# Patient Record
Sex: MALE | Race: WHITE | Employment: UNEMPLOYED | RURAL
[De-identification: names, ages, dates, MRNs, and addresses within clinical notes are randomized per-mention and may not be internally consistent; named-entity substitution may affect disease eponyms.]

---

## 2017-01-06 ENCOUNTER — TELEPHONE (OUTPATIENT)
Dept: FAMILY MEDICINE CLINIC | Age: 52
End: 2017-01-06

## 2017-01-06 NOTE — TELEPHONE ENCOUNTER
See note below. LOV 12/28/16. Pt states legs & feet are still swelling & mostly at night, pt states he would like to see cardio if you think its warranted. Please advise.

## 2017-01-09 DIAGNOSIS — R60.9 EDEMA, UNSPECIFIED TYPE: Primary | ICD-10-CM

## 2017-01-09 NOTE — TELEPHONE ENCOUNTER
I am fine with that. I have placed an order for him to be seen by Dr. Sea Harris, and he should call and make an appointment when he is able\" [phone]. Thanks!

## 2017-01-10 NOTE — TELEPHONE ENCOUNTER
See notes below. advised pt order was placed to see Cardio/Dr. Delores Flower & to call to schedule apt when able.

## 2017-01-13 ENCOUNTER — OFFICE VISIT (OUTPATIENT)
Dept: FAMILY MEDICINE CLINIC | Age: 52
End: 2017-01-13

## 2017-01-13 VITALS
SYSTOLIC BLOOD PRESSURE: 128 MMHG | DIASTOLIC BLOOD PRESSURE: 84 MMHG | BODY MASS INDEX: 30.92 KG/M2 | HEART RATE: 74 BPM | OXYGEN SATURATION: 95 % | HEIGHT: 67 IN | RESPIRATION RATE: 16 BRPM | WEIGHT: 197 LBS | TEMPERATURE: 98.4 F

## 2017-01-13 DIAGNOSIS — B07.0 PLANTAR WART: Primary | ICD-10-CM

## 2017-01-13 NOTE — PROGRESS NOTES
Subjective:      Daina Tompkins is a 46 y.o. male here today with complaint of rash to the right great toe. Has noticed over the last few days. He is currently on terbinafine for athlete's foot which he reports compliance with. He reports that area is minimally tender. No associated drainage or erythema. Current Outpatient Prescriptions   Medication Sig Dispense Refill    terbinafine HCl (LAMISIL) 250 mg tablet Take 1 Tab by mouth daily. 14 Tab 0    promethazine (PHENERGAN) 25 mg tablet Take 25 mg by mouth every six (6) hours as needed for Nausea.  oxyCODONE-acetaminophen (PERCOCET) 5-325 mg per tablet Take 1-2 tablets by mouth every 4 to 6 hours as needed for pain 90 Tab 0    diazePAM (VALIUM) 5 mg tablet Take 1 Tab by mouth every eight (8) hours as needed (muscle spasm). Max Daily Amount: 15 mg. 30 Tab 2    docusate sodium (COLACE) 100 mg capsule Take 1 Cap by mouth two (2) times a day. 60 Cap 2    albuterol (PROVENTIL HFA, VENTOLIN HFA, PROAIR HFA) 90 mcg/actuation inhaler Take 2 Puffs by inhalation every four (4) hours as needed for Wheezing. 1 Inhaler 2    EPINEPHrine (EPIPEN) 0.3 mg/0.3 mL (1:1,000) injection 0.3 mL by IntraMUSCular route once as needed for up to 1 dose. 0.6 mL 2    pantoprazole (PROTONIX) 40 mg tablet Take 1 Tab by mouth daily. (Patient taking differently: Take 40 mg by mouth two (2) times a day.) 90 Tab 1       Allergies   Allergen Reactions    Venom-Honey Bee Anaphylaxis    Methadone Rash    Neurontin [Gabapentin] Vertigo    Penicillins Rash     Pt.  States he takes Keflex with no difficulties or adverse reactions    Trilafon Other (comments)     Tardive dyskinesia         Past Medical History   Diagnosis Date    Arthritis     Asthma     Back pain     Chronic obstructive pulmonary disease (HCC)     Chronic pain      back    GERD (gastroesophageal reflux disease)     Heart attack ([facility] [address] 2002    Ill-defined condition      Obesity  BMI=30 on 11/17/2016    Seizures [facility])      last seizure 2002    Thromboembolism of vein 1980s     multiple in left leg    TIA (transient ischemic attack) 2000     no residual deficits       Social History   Substance Use Topics    Smoking status: Current Every Day Smoker     Packs/day: 1.00     Years: 40.00    Smokeless tobacco: Never Used    Alcohol use 1.8 oz/week     3 Cans of beer per week        Review of Systems  Pertinent items are noted in HPI. Objective:     Visit Vitals    /84    Pulse 74    Temp 98.4 °F (36.9 °C) (Oral)    Resp 16    Ht 5' 7\" (1.702 m)    Wt 197 lb (89.4 kg)    SpO2 95%    BMI 30.85 kg/m2      General appearance - alert, well appearing, and in no distress  Chest - respirations unlabored   Extremities - right big toe with 3 flat lesions with warty appearance, (+) tinea pedis     Assessment/Plan:   Clora Schwab is a 46 y.o. male seen for:     1. Plantar wart: After informed consent was obtained, using alcohol for cleansing cryotherapy with liquid nitrogen was performed. Antibiotic dressing is applied, and wound care instructions provided. Be alert for any signs of cutaneous infection. The procedure was well tolerated without complications.  - [id number] - DESTRUC BENIGN LESION, UP TO 14 LESIONS    I have discussed the diagnosis with the patient and the intended plan as seen in the above orders. The patient has received an after-visit summary and questions were answered concerning future plans. I have discussed medication side effects and warnings with the patient as well. Patient verbalizes understanding of plan of care and denies further questions or concerns at this time. Informed patient to return to the office if symptoms worsen or if new symptoms arise.     Follow-up Disposition: Not on File

## 2017-01-13 NOTE — PROGRESS NOTES
Identified pt with two pt identifiers(name and ). Chief Complaint   Patient presents with    Rash     right great toe      Pt was seen on  for athletes foot & pt thinks it may not be athletes foot. Health Maintenance Due   Topic    Pneumococcal 19-64 Medium Risk (1 of 1 - PPSV23)    DTaP/Tdap/Td series (1 - Tdap)    FOBT Q 1 YEAR AGE 50-75        Wt Readings from Last 3 Encounters:   17 197 lb (89.4 kg)   16 197 lb (89.4 kg)   16 193 lb (87.5 kg)     Temp Readings from Last 3 Encounters:   17 98.4 °F (36.9 °C) (Oral)   16 98 °F (36.7 °C) (Oral)   16 98.2 °F (36.8 °C) (Oral)     BP Readings from Last 3 Encounters:   17 128/84   16 120/78   16 120/78     Pulse Readings from Last 3 Encounters:   17 74   16 89   16 88         Learning Assessment:  :     Learning Assessment 2016 12/10/2015   PRIMARY LEARNER Patient Patient   HIGHEST LEVEL OF EDUCATION - PRIMARY LEARNER  2 YEARS OF COLLEGE -   CO-LEARNER CAREGIVER No No   PRIMARY LANGUAGE ENGLISH ENGLISH   LEARNER PREFERENCE PRIMARY LISTENING LISTENING   ANSWERED BY patient patient   RELATIONSHIP SELF SELF       Depression Screening:  :     PHQ 2 / 9, over the last two weeks 2017   Little interest or pleasure in doing things Not at all   Feeling down, depressed or hopeless Not at all   Total Score PHQ 2 0           Coordination of Care Questionnaire:  :     1) Have you been to an emergency room, urgent care clinic since your last visit? no   Hospitalized since your last visit? no             2) Have you seen or consulted any other health care providers outside of [address] [place] [address] since your last visit? no  (Include any pap smears or colon screenings in this section.)    3) Do you have an Advance Directive on file?  no  Are you interested in receiving information about Advance Directives? no    Patient is accompanied by self I have received verbal consent from Yaya Schwarz  to discuss any/all medical information while they are present in the room. Reviewed record in preparation for visit and have obtained necessary documentation. Medication reconciliation up to date and corrected with patient at this time.

## 2017-01-16 ENCOUNTER — OFFICE VISIT (OUTPATIENT)
Dept: CARDIOLOGY CLINIC | Age: 52
End: 2017-01-16

## 2017-01-16 VITALS
WEIGHT: 196 LBS | DIASTOLIC BLOOD PRESSURE: 90 MMHG | BODY MASS INDEX: 30.76 KG/M2 | RESPIRATION RATE: 22 BRPM | SYSTOLIC BLOOD PRESSURE: 142 MMHG | OXYGEN SATURATION: 99 % | HEART RATE: 88 BPM | HEIGHT: 67 IN

## 2017-01-16 DIAGNOSIS — R06.00 DYSPNEA, UNSPECIFIED TYPE: ICD-10-CM

## 2017-01-16 DIAGNOSIS — R07.9 CHEST PAIN, UNSPECIFIED TYPE: Primary | ICD-10-CM

## 2017-01-16 DIAGNOSIS — R60.9 EDEMA, UNSPECIFIED TYPE: ICD-10-CM
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
## 2017-01-17 ENCOUNTER — HOSPITAL ENCOUNTER (OUTPATIENT)
Dept: GENERAL RADIOLOGY | Age: 52
Discharge: HOME | End: 2017-01-17
Attending: SPECIALIST
Payer: MEDICARE

## 2017-01-17 DIAGNOSIS — K11.20 SIALADENITIS: ICD-10-CM

## 2017-01-17 DIAGNOSIS — R13.10 DYSPHAGIA: ICD-10-CM

## 2017-01-17 PROCEDURE — 92611 MOTION FLUOROSCOPY/SWALLOW: CPT | Performed by: SPEECH-LANGUAGE PATHOLOGIST

## 2017-01-17 PROCEDURE — 74230 X-RAY XM SWLNG FUNCJ C+: CPT
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
## 2017-01-20 ENCOUNTER — OFFICE VISIT (OUTPATIENT)
Dept: FAMILY MEDICINE CLINIC | Age: 52
End: 2017-01-20

## 2017-01-20 VITALS
WEIGHT: 202 LBS | BODY MASS INDEX: 31.71 KG/M2 | SYSTOLIC BLOOD PRESSURE: 130 MMHG | HEART RATE: 83 BPM | OXYGEN SATURATION: 99 % | HEIGHT: 67 IN | TEMPERATURE: 97.8 F | DIASTOLIC BLOOD PRESSURE: 72 MMHG | RESPIRATION RATE: 16 BRPM

## 2017-01-20 DIAGNOSIS — R51.9 FREQUENT HEADACHES: ICD-10-CM

## 2017-01-20 DIAGNOSIS — Z23 ENCOUNTER FOR IMMUNIZATION: ICD-10-CM

## 2017-01-20 DIAGNOSIS — T14.8XXA PUNCTURE WOUND: Primary | ICD-10-CM

## 2017-01-20 RX ORDER — CEPHALEXIN 500 MG/1
500 CAPSULE ORAL 3 TIMES DAILY
Qty: 30 CAP | Refills: 0 | Status: SHIPPED | OUTPATIENT
Start: 2017-01-20 | End: 2017-01-30 | Stop reason: ALTCHOICE

## 2017-01-20 RX ORDER — MUPIROCIN 20 MG/G
OINTMENT TOPICAL 2 TIMES DAILY
Qty: 22 G | Refills: 0 | Status: SHIPPED | OUTPATIENT
Start: 2017-01-20 | End: 2017-01-30 | Stop reason: ALTCHOICE

## 2017-01-20 NOTE — MR AVS SNAPSHOT
Visit Information Date & Time Provider Department Dept. Phone Encounter #  
 1/20/2017 10:30 AM [facility]ak [facility]to[facility] [phone] [id number] Follow-up Instructions Return if symptoms worsen or fail to improve. Your Appointments 1/27/2017 10:00 AM  
NUCLEAR MEDICINE with CLARENCE MCKINNEY  
[facility] ([facility] SCHEDULING) Appt Note: lexiscan nuclear stress test at 10am ht 5'7 wt 196 [facility] [place] [address] [id number]  
[phone]  
  
   
 [address] [facility] [address] [address]  
  
    
 1/27/2017  1:00 PM  
ECHO CARDIOGRAMS 2D with CLARENCE MULLIGAN  
[facility] ([facility]) Appt Note: lexiscan nuclear stress test at 10am ht 5'7 wt 196 [facility] [address] [facility]  
[phone]  
  
   
 [address] [address]  
  
    
 2/2/2017  2:00 PM  
ESTABLISHED PATIENT with Genet Hyde MD  
[facility] ([facility]) Appt Note: follow up from testing [address] [address] [facility]  
[address] [facility] [place] [address] [address] Upcoming Health Maintenance Date Due FOBT Q 1 YEAR AGE 50-75 2/27/2013 DTaP/Tdap/Td series (2 - Td) 1/20/2027 Allergies as of 1/20/2017  Review Complete On: 1/20/2017 By: Barak Cotto NP Severity Noted Reaction Type Reactions Venom-honey Bee High 08/12/2015    Anaphylaxis Methadone  08/12/2015    Rash Neurontin [Gabapentin]  08/12/2015    Vertigo Penicillins  08/12/2015    Rash Pt. States he takes Keflex with no difficulties or adverse reactions Trilafon  08/12/2015    Other (comments) Tardive dyskinesia Current Immunizations  Never Reviewed Name Date Tdap  Incomplete Not reviewed this visit You Were Diagnosed With   
  
 Codes Comments Puncture wound    -  Primary ICD-10-CM: T14.8 ICD-9-CM: 879.8 Encounter for immunization     ICD-10-CM: V30 ICD-9-CM: V03.89 Vitals BP Pulse Temp Resp Height(growth percentile) Weight(growth percentile) 130/72 83 97.8 °F (36.6 °C) (Oral) 16 5' 7\" (1.702 m) 202 lb (91.6 kg) SpO2 BMI Smoking Status 99% 31.64 kg/m2 Current Every Day Smoker BMI and BSA Data Body Mass Index Body Surface Area  
 31.64 kg/m 2 2.08 m 2 Preferred Pharmacy Pharmacy Name Phone [id number] - [place] [address] [facility] [address] [phone] Your Updated Medication List  
  
   
This list is accurate as of: 1/20/17 10:52 AM.  Always use your most recent med list.  
  
  
  
  
 albuterol 90 mcg/actuation inhaler Commonly known as:  PROVENTIL HFA, VENTOLIN HFA, PROAIR HFA Take 2 Puffs by inhalation every four (4) hours as needed for Wheezing. cephALEXin 500 mg capsule Commonly known as:  Wood Bless Take 1 Cap by mouth three (3) times daily for 10 days. diazePAM 5 mg tablet Commonly known as:  VALIUM Take 1 Tab by mouth every eight (8) hours as needed (muscle spasm). Max Daily Amount: 15 mg.  
  
 docusate sodium 100 mg capsule Commonly known as:  Lucetta Angeles Take 1 Cap by mouth two (2) times a day. EPINEPHrine 0.3 mg/0.3 mL injection Commonly known as:  EPIPEN  
0.3 mL by IntraMUSCular route once as needed for up to 1 dose. mupirocin 2 % ointment Commonly known as:  [employer] Apply  to affected area two (2) times a day. oxyCODONE-acetaminophen 5-325 mg per tablet Commonly known as:  PERCOCET Take 1-2 tablets by mouth every 4 to 6 hours as needed for pain  
  
 pantoprazole 40 mg tablet Commonly known as:  PROTONIX Take 1 Tab by mouth daily. promethazine 25 mg tablet Commonly known as:  PHENERGAN  
 Take 25 mg by mouth every six (6) hours as needed for Nausea. Prescriptions Sent to Pharmacy Refills  
 mupirocin (BACTROBAN) 2 % ointment 0 Sig: Apply  to affected area two (2) times a day. Class: Normal  
 [id number]. Ph #: [phone] Route: Topical  
 cephALEXin (KEFLEX) 500 mg capsule 0 Sig: Take 1 Cap by mouth three (3) times daily for 10 days. Class: Normal  
 [id number]  [facility]. Ph #: [phone] Route: Oral  
  
We Performed the Following TETANUS, DIPHTHERIA TOXOIDS AND ACELLULAR PERTUSSIS VACCINE (TDAP), IN INDIVIDS. >=7, IM [id number] CPT(R)] Follow-up Instructions Return if symptoms worsen or fail to improve. Patient Instructions Vaccine Information Statement Tdap (Tetanus, Diphtheria, Pertussis) Vaccine: What You Need to Know Many Vaccine Information Statements are available in Chinese and other languages. See www.immunize.org/vis. Hojas de Información Sobre Vacunas están disponibles en español y en muchos otros idiomas. Visite [place] City[employer].si 1. Why get vaccinated? Tetanus, diphtheria, and pertussis are very serious diseases. Tdap vaccine can protect us from these diseases. And, Tdap vaccine given to pregnant women can protect  babies against pertussis. TETANUS (Lockjaw) is rare in the [facility] today. It causes painful muscle tightening and stiffness, usually all over the body. ? It can lead to tightening of muscles in the head and neck so you cant open your mouth, swallow, or sometimes even breathe. Tetanus kills about 1 out of 10 people who are infected even after receiving the best medical care. DIPHTHERIA is also rare in the [facility] today. It can cause a thick coating to form in the back of the throat. ? It can lead to breathing problems, heart failure, paralysis, and death. PERTUSSIS (Whooping Cough) causes severe coughing spells, which can cause difficulty breathing, vomiting, and disturbed sleep. ? It can also lead to weight loss, incontinence, and rib fractures. Up to 2 in 100 adolescents and 5 in 100 adults with pertussis are hospitalized or have complications, which could include pneumonia or death. These diseases are caused by bacteria. Diphtheria and pertussis are spread from person to person through secretions from coughing or sneezing. Tetanus enters the body through cuts, scratches, or wounds. Before vaccines, as many as 200,000 cases of diphtheria, 200,000 cases of pertussis, and hundreds of cases of tetanus, were reported in the [place] each year. Since vaccination began, reports of cases for tetanus and diphtheria have dropped by about 99% and for pertussis by about 80%. 2. Tdap vaccine Tdap vaccine can protect adolescents and adults from tetanus, diphtheria, and pertussis. One dose of Tdap is routinely given at age 6 or 15. People who did not get Tdap at that age should get it as soon as possible. Tdap is especially important for health care professionals and anyone having close contact with a baby younger than 12 months. Pregnant women should get a dose of Tdap during every pregnancy, to protect the  from pertussis. Infants are most at risk for severe, life-threatening complications from pertussis. Another vaccine, called Td, protects against tetanus and diphtheria, but not pertussis. A Td booster should be given every 10 years. Tdap may be given as one of these boosters if you have never gotten Tdap before. Tdap may also be given after a severe cut or burn to prevent tetanus infection. Your doctor or the person giving you the vaccine can give you more information. Tdap may safely be given at the same time as other vaccines. 3. Some people should not get this vaccine  A person who has ever had a life-threatening allergic reaction after a previous dose of any diphtheria, tetanus or pertussis containing vaccine, OR has a severe allergy to any part of this vaccine, should not get Tdap vaccine. Tell the person giving the vaccine about any severe allergies.  Anyone who had coma or long repeated seizures within 7 days after a childhood dose of DTP or DTaP, or a previous dose of Tdap, should not get Tdap, unless a cause other than the vaccine was found. They can still get Td.  Talk to your doctor if you: 
- have seizures or another nervous system problem, 
- had severe pain or swelling after any vaccine containing diphtheria, tetanus or pertussis,  
- ever had a condition called Guillain Barré Syndrome (GBS), 
- arent feeling well on the day the shot is scheduled. 4. Risks With any medicine, including vaccines, there is a chance of side effects. These are usually mild and go away on their own. Serious reactions are also possible but are rare. Most people who get Tdap vaccine do not have any problems with it. Mild Problems following Tdap 
(Did not interfere with activities)  Pain where the shot was given (about 3 in 4 adolescents or 2 in 3 adults)  Redness or swelling where the shot was given (about 1 person in 5)  Mild fever of at least 100.4°F (up to about 1 in 25 adolescents or 1 in 100 adults)  Headache (about 3 or 4 people in 10)  Tiredness (about 1 person in 3 or 4)  Nausea, vomiting, diarrhea, stomach ache (up to 1 in 4 adolescents or 1 in 10 adults)  Chills,  sore joints (about 1 person in 10)  Body aches (about 1 person in 3 or 4)  Rash, swollen glands (uncommon) Moderate Problems following Tdap (Interfered with activities, but did not require medical attention)  Pain where the shot was given (up to 1 in 5 or 6)  Redness or swelling where the shot was given (up to about 1 in 16 adolescents or 1 in 12 adults)  Fever over 102°F (about 1 in 100 adolescents or 1 in 250 adults)  Headache (about 1 in 7 adolescents or 1 in 10 adults)  Nausea, vomiting, diarrhea, stomach ache (up to 1 or 3 people in 100)  Swelling of the entire arm where the shot was given (up to about 1 in 500). Severe Problems following Tdap 
(Unable to perform usual activities; required medical attention)  Swelling, severe pain, bleeding, and redness in the arm where the shot was given (rare). Problems that could happen after any vaccine:  People sometimes faint after a medical procedure, including vaccination. Sitting or lying down for about 15 minutes can help prevent fainting, and injuries caused by a fall. Tell your doctor if you feel dizzy, or have vision changes or ringing in the ears.  Some people get severe pain in the shoulder and have difficulty moving the arm where a shot was given. This happens very rarely.  Any medication can cause a severe allergic reaction. Such reactions from a vaccine are very rare, estimated at fewer than 1 in a million doses, and would happen within a few minutes to a few hours after the vaccination. As with any medicine, there is a very remote chance of a vaccine causing a serious injury or death. The safety of vaccines is always being monitored. For more information, visit: www.cdc.gov/vaccinesafety/ 
 
 
The [facility] Vaccine Injury Compensation Program (VICP) is a federal program that was created to compensate people who may have been injured by certain vaccines. Persons who believe they may have been injured by a vaccine can learn about the program and about filing a claim by calling 1-116.292.2135 or visiting the [employer] website at www.[facility].gov/vaccinecompensation. There is a time limit to file a claim for compensation. 7. How can I learn more?  Ask your doctor. He or she can give you the vaccine package insert or suggest other sources of information.  Call your local or state health department.  Contact the Centers for Disease Control and Prevention (CDC): 
- Call 6-385.940.2802 (4-030-SYM-INFO) or 
- Visit CDCs website at www.cdc.gov/vaccines Vaccine Information Statement Tdap Vaccine 
(2/24/2015) [address] 194YI-25 Department of Health and [employer] Centers for Disease Control and Prevention Office Use Only Introducing [facility]! Tricia Lopez introduces Changelight patient portal. Now you can access parts of your medical record, email your doctor's office, and request medication refills online. 1. In your internet browser, go to [email or web address] [email or web address]/Related Content Database (RCDb)t 2. Click on the First Time User? Click Here link in the Sign In box. You will see the New Member Sign Up page. 3. Enter your Changelight Access Code exactly as it appears below. You will not need to use this code after youve completed the sign-up process. If you do not sign up before the expiration date, you must request a new code. · Vinspi Access Code: [email or web address] Expires: 4/20/2017 10:52 AM 
 
4. Enter the last four digits of your Social Security Number (xxxx) and Date of Birth (mm/dd/yyyy) as indicated and click Submit. You will be taken to the next sign-up page. 5. Create a Vinspi ID. This will be your Vinspi login ID and cannot be changed, so think of one that is secure and easy to remember. 6. Create a Vinspi password. You can change your password at any time. 7. Enter your Password Reset Question and Answer. This can be used at a later time if you forget your password. 8. Enter your e-mail address. You will receive e-mail notification when new information is available in [address] 9. Click Sign Up. You can now view and download portions of your medical record. 10. Click the Download Summary menu link to download a portable copy of your medical information. If you have questions, please visit the Frequently Asked Questions section of the Vinspi website. Remember, Vinspi is NOT to be used for urgent needs. For medical emergencies, dial 911. Now available from your iPhone and Android! Please provide this summary of care documentation to your next provider. Your primary care clinician is listed as Sarika Carmona. If you have any questions after today's visit, please call [phone].

## 2017-01-20 NOTE — PATIENT INSTRUCTIONS
Vaccine Information Statement     Tdap (Tetanus, Diphtheria, Pertussis) Vaccine: What You Need to Know    Many Vaccine Information Statements are available in Tajik and other languages. See www.immunize.org/vis. Hojas de Información Sobre Vacunas están disponibles en español y en muchos otros idiomas. Visite YudiScale.si    1. Why get vaccinated? Tetanus, diphtheria, and pertussis are very serious diseases. Tdap vaccine can protect us from these diseases. And, Tdap vaccine given to pregnant women can protect  babies against pertussis. TETANUS (Lockjaw) is rare in the [facility] today. It causes painful muscle tightening and stiffness, usually all over the body.  It can lead to tightening of muscles in the head and neck so you cant open your mouth, swallow, or sometimes even breathe. Tetanus kills about 1 out of 10 people who are infected even after receiving the best medical care. DIPHTHERIA is also rare in the [facility] today. It can cause a thick coating to form in the back of the throat.  It can lead to breathing problems, heart failure, paralysis, and death. PERTUSSIS (Whooping Cough) causes severe coughing spells, which can cause difficulty breathing, vomiting, and disturbed sleep.  It can also lead to weight loss, incontinence, and rib fractures. Up to 2 in 100 adolescents and 5 in 100 adults with pertussis are hospitalized or have complications, which could include pneumonia or death. These diseases are caused by bacteria. Diphtheria and pertussis are spread from person to person through secretions from coughing or sneezing. Tetanus enters the body through cuts, scratches, or wounds. Before vaccines, as many as 200,000 cases of diphtheria, 200,000 cases of pertussis, and hundreds of cases of tetanus, were reported in the [place] each year.  Since vaccination began, reports of cases for tetanus and diphtheria have dropped by about 99% and for pertussis by about 80%. 2. Tdap vaccine    Tdap vaccine can protect adolescents and adults from tetanus, diphtheria, and pertussis. One dose of Tdap is routinely given at age 6 or 15. People who did not get Tdap at that age should get it as soon as possible. Tdap is especially important for health care professionals and anyone having close contact with a baby younger than 12 months. Pregnant women should get a dose of Tdap during every pregnancy, to protect the  from pertussis. Infants are most at risk for severe, life-threatening complications from pertussis. Another vaccine, called Td, protects against tetanus and diphtheria, but not pertussis. A Td booster should be given every 10 years. Tdap may be given as one of these boosters if you have never gotten Tdap before. Tdap may also be given after a severe cut or burn to prevent tetanus infection. Your doctor or the person giving you the vaccine can give you more information. Tdap may safely be given at the same time as other vaccines. 3. Some people should not get this vaccine     A person who has ever had a life-threatening allergic reaction after a previous dose of any diphtheria, tetanus or pertussis containing vaccine, OR has a severe allergy to any part of this vaccine, should not get Tdap vaccine. Tell the person giving the vaccine about any severe allergies.  Anyone who had coma or long repeated seizures within 7 days after a childhood dose of DTP or DTaP, or a previous dose of Tdap, should not get Tdap, unless a cause other than the vaccine was found. They can still get Td.  Talk to your doctor if you:  - have seizures or another nervous system problem,  - had severe pain or swelling after any vaccine containing diphtheria, tetanus or pertussis,   - ever had a condition called Guillain Barré Syndrome (GBS),  - arent feeling well on the day the shot is scheduled.     4. Risks    With any medicine, including vaccines, there is a chance of side effects. These are usually mild and go away on their own. Serious reactions are also possible but are rare. Most people who get Tdap vaccine do not have any problems with it. Mild Problems following Tdap  (Did not interfere with activities)   Pain where the shot was given (about 3 in 4 adolescents or 2 in 3 adults)   Redness or swelling where the shot was given (about 1 person in 5)   Mild fever of at least 100.4°F (up to about 1 in 25 adolescents or 1 in 100 adults)   Headache (about 3 or 4 people in 10)   Tiredness (about 1 person in 3 or 4)   Nausea, vomiting, diarrhea, stomach ache (up to 1 in 4 adolescents or 1 in 10 adults)   Chills,  sore joints (about 1 person in 10)   Body aches (about 1 person in 3 or 4)    Rash, swollen glands (uncommon)    Moderate Problems following Tdap  (Interfered with activities, but did not require medical attention)   Pain where the shot was given (up to 1 in 5 or 6)    Redness or swelling where the shot was given (up to about 1 in 16 adolescents or 1 in 12 adults)   Fever over 102°F (about 1 in 100 adolescents or 1 in 250 adults)   Headache (about 1 in 7 adolescents or 1 in 10 adults)   Nausea, vomiting, diarrhea, stomach ache (up to 1 or 3 people in 100)   Swelling of the entire arm where the shot was given (up to about 1 in 500). Severe Problems following Tdap  (Unable to perform usual activities; required medical attention)   Swelling, severe pain, bleeding, and redness in the arm where the shot was given (rare). Problems that could happen after any vaccine:     People sometimes faint after a medical procedure, including vaccination. Sitting or lying down for about 15 minutes can help prevent fainting, and injuries caused by a fall. Tell your doctor if you feel dizzy, or have vision changes or ringing in the ears.      Some people get severe pain in the shoulder and have difficulty moving the arm where a shot was given. This happens very rarely.  Any medication can cause a severe allergic reaction. Such reactions from a vaccine are very rare, estimated at fewer than 1 in a million doses, and would happen within a few minutes to a few hours after the vaccination. As with any medicine, there is a very remote chance of a vaccine causing a serious injury or death. The safety of vaccines is always being monitored. For more information, visit: www.cdc.gov/vaccinesafety/    5. What if there is a serious problem? What should I look for?  Look for anything that concerns you, such as signs of a severe allergic reaction, very high fever, or unusual behavior.  Signs of a severe allergic reaction can include hives, swelling of the face and throat, difficulty breathing, a fast heartbeat, dizziness, and weakness. These would usually start a few minutes to a few hours after the vaccination. What should I do?  If you think it is a severe allergic reaction or other emergency that cant wait, call 9-1-1 or get the person to the nearest hospital. Otherwise, call your doctor.  Afterward, the reaction should be reported to the Vaccine Adverse Event Reporting System (VAERS). Your doctor might file this report, or you can do it yourself through the VAERS web site at www.vaers. [facility].gov, or by calling 6-768.133.8698. VAERS does not give medical advice. 6. The National Vaccine Injury Compensation Program    The [facility] Vaccine Injury Compensation Program (VICP) is a federal program that was created to compensate people who may have been injured by certain vaccines. Persons who believe they may have been injured by a vaccine can learn about the program and about filing a claim by calling 4-365.111.4927 or visiting the [employer] website at www.[facility].gov/vaccinecompensation. There is a time limit to file a claim for compensation. 7. How can I learn more?  Ask your doctor.  He or she can give you the vaccine package insert or suggest other sources of information.  Call your local or state health department.  Contact the Centers for Disease Control and Prevention (CDC):  - Call 4-442.865.7477 (1-800-CDC-INFO) or  - Visit CDCs website at www.cdc.gov/vaccines      Vaccine Information Statement   Tdap Vaccine  (2/24/2015)  [address] [id number]-11    Department of Health and Human Services  Centers for Disease Control and Prevention    Office Use Only

## 2017-01-20 NOTE — PROGRESS NOTES
Identified pt with two pt identifiers(name and ). Chief Complaint   Patient presents with    Puncture Wound     17    Headache      Pt stepped on nail 17    Health Maintenance Due   Topic    Pneumococcal 19-64 Medium Risk (1 of 1 - PPSV23)    DTaP/Tdap/Td series (1 - Tdap)    FOBT Q 1 YEAR AGE 50-75        Wt Readings from Last 3 Encounters:   17 202 lb (91.6 kg)   17 196 lb (88.9 kg)   17 197 lb (89.4 kg)     Temp Readings from Last 3 Encounters:   17 97.8 °F (36.6 °C) (Oral)   17 98.4 °F (36.9 °C) (Oral)   16 98 °F (36.7 °C) (Oral)     BP Readings from Last 3 Encounters:   17 130/72   17 142/90   17 128/84     Pulse Readings from Last 3 Encounters:   17 83   17 88   17 74         Learning Assessment:  :     Learning Assessment 2016 12/10/2015   PRIMARY LEARNER Patient Patient   HIGHEST LEVEL OF EDUCATION - PRIMARY LEARNER  2 YEARS OF COLLEGE -   CO-LEARNER CAREGIVER No No   PRIMARY LANGUAGE ENGLISH ENGLISH   LEARNER PREFERENCE PRIMARY LISTENING LISTENING   ANSWERED BY patient patient   RELATIONSHIP SELF SELF       Depression Screening:  :     PHQ 2 / 9, over the last two weeks 2017   Little interest or pleasure in doing things Not at all   Feeling down, depressed or hopeless Not at all   Total Score PHQ 2 0           Coordination of Care Questionnaire:  :     1) Have you been to an emergency room, urgent care clinic since your last visit? no   Hospitalized since your last visit? no             2) Have you seen or consulted any other health care providers outside of [address] [place] [address] since your last visit? no  (Include any pap smears or colon screenings in this section.)    3) Do you have an Advance Directive on file?  no  Are you interested in receiving information about Advance Directives? no    Patient is accompanied by self I have received verbal consent from Negra Maldonado to discuss any/all medical information while they are present in the room. Reviewed record in preparation for visit and have obtained necessary documentation. Medication reconciliation up to date and corrected with patient at this time.

## 2017-01-20 NOTE — PROGRESS NOTES
HISTORY OF PRESENT ILLNESS  Francisco Javier Arteaga is a 46 y.o. male. HPI  Pt presents with \"puncture wound\"    Pt states that he stepped on 2 nails, yesterday  He was taking down robert decorations, and stepped on a 2 x 4. The nail did go through the bottom of his tennis shoe. He has no idea when he last had a tetanus vaccine. No discharge or drainage noted from the area. No swelling, no erythema. Pt is worried about infection, with his lack of a spleen. In addition, patient states that he is interested in talking to a neurologist about his migraines. He used to have them under control, but over the past couple of weeks, he has noted an increase. Pt is unsure what migraine medication he was taking previously. Review of Systems   Constitutional: Negative for fever. HENT: Negative for congestion. Respiratory: Negative for cough. Gastrointestinal: Negative for diarrhea and vomiting. Neurological: Positive for headaches. Physical Exam   Constitutional: He is oriented to person, place, and time. He appears well-developed and well-nourished. HENT:   Head: Normocephalic and atraumatic. Cardiovascular: Normal rate, regular rhythm and normal heart sounds. Pulmonary/Chest: Effort normal and breath sounds normal.   Neurological: He is alert and oriented to person, place, and time. Skin: Skin is warm and dry. Psychiatric: He has a normal mood and affect. His behavior is normal.       ASSESSMENT and PLAN    ICD-10-CM ICD-9-CM    1. Puncture wound T14.8 879.8 mupirocin (BACTROBAN) 2 % ointment      cephALEXin (KEFLEX) 500 mg capsule   2. Encounter for immunization Z23 V03.89 TETANUS, DIPHTHERIA TOXOIDS AND ACELLULAR PERTUSSIS VACCINE (TDAP), IN INDIVIDS. >=7, IM   3. Frequent headaches R51 784.0 REFERRAL TO NEUROLOGY     Vaccine and VIS given. Educated about taking antibiotics as prescribed, should area begin to turn erythematous, more painful, any discharge or drainage.   Educated about calling neurology, as patient requests. Pt informed to return to office with worsening of symptoms, or PRN with any questions or concerns. Pt verbalizes understanding of plan of care and denies further questions or concerns at this time.

## 2017-01-27 ENCOUNTER — CLINICAL SUPPORT (OUTPATIENT)
Dept: CARDIOLOGY CLINIC | Age: 52
End: 2017-01-27

## 2017-01-27 DIAGNOSIS — R06.00 DYSPNEA, UNSPECIFIED TYPE: ICD-10-CM

## 2017-01-27 DIAGNOSIS — R07.89 OTHER CHEST PAIN: Primary | ICD-10-CM

## 2017-01-27 DIAGNOSIS — R60.9 EDEMA, UNSPECIFIED TYPE: ICD-10-CM

## 2017-01-27 DIAGNOSIS — R07.9 CHEST PAIN, UNSPECIFIED TYPE: Primary | ICD-10-CM

## 2017-01-30 ENCOUNTER — TELEPHONE (OUTPATIENT)
Dept: NEUROLOGY | Age: 52
End: 2017-01-30

## 2017-01-30 ENCOUNTER — OFFICE VISIT (OUTPATIENT)
Dept: NEUROLOGY | Age: 52
End: 2017-01-30

## 2017-01-30 VITALS
WEIGHT: 202 LBS | SYSTOLIC BLOOD PRESSURE: 128 MMHG | RESPIRATION RATE: 16 BRPM | OXYGEN SATURATION: 98 % | TEMPERATURE: 98.6 F | BODY MASS INDEX: 31.71 KG/M2 | HEART RATE: 75 BPM | DIASTOLIC BLOOD PRESSURE: 88 MMHG | HEIGHT: 67 IN

## 2017-01-30 DIAGNOSIS — G43.009 MIGRAINE WITHOUT AURA AND WITHOUT STATUS MIGRAINOSUS, NOT INTRACTABLE: Primary | ICD-10-CM

## 2017-01-30 RX ORDER — TOPIRAMATE 25 MG/1
TABLET ORAL
Qty: 30 TAB | Refills: 6 | Status: SHIPPED | OUTPATIENT
Start: 2017-01-30 | End: 2017-01-30 | Stop reason: SDUPTHER

## 2017-01-30 RX ORDER — OXYCODONE AND ACETAMINOPHEN 7.5; 325 MG/1; MG/1
TABLET ORAL
COMMUNITY
End: 2017-04-14 | Stop reason: ALTCHOICE

## 2017-01-30 RX ORDER — TOPIRAMATE 25 MG/1
TABLET ORAL
Qty: 30 TAB | Refills: 6 | Status: SHIPPED | OUTPATIENT
Start: 2017-01-30 | End: 2017-04-14 | Stop reason: ALTCHOICE

## 2017-01-30 NOTE — PROGRESS NOTES
New patient presenting with history of migraines. Reports 5 headache days per month lasting 4 hours or more. Patient recently had surgery on c5, c6, c7  In November. Have been taking percocet since surgery on neck.

## 2017-01-30 NOTE — PATIENT INSTRUCTIONS
These headaches by description have a very close analogy to migraines. Will challenge with Topamax at night as a preventative drug and the patient will clear the rescue drug Imitrex by his cardiologist.  He is welcome to keep a headache diary. Suggest stopping smoking. Fortunately the neck does not seem to be a significant contributor to the headache story.

## 2017-01-30 NOTE — MR AVS SNAPSHOT
Visit Information Date & Time Provider Department Dept. Phone Encounter #  
 1/30/2017 11:00 AM Analilia Mendosa MD [facility] [phone] [id number] Follow-up Instructions Return in about 2 months (around 3/30/2017). Your Appointments 2/2/2017  2:00 PM  
ESTABLISHED PATIENT with Kwaku Greene MD  
[facility] ([address]) Appt Note: follow up from testing [address] [facility] [address] [facility]  
[address] [address] [address] Upcoming Health Maintenance Date Due FOBT Q 1 YEAR AGE 50-75 2/27/2013 DTaP/Tdap/Td series (2 - Td) 1/20/2027 Allergies as of 1/30/2017  Review Complete On: 1/27/2017 By: Jenae Burns RN Severity Noted Reaction Type Reactions Codeine High 01/30/2017    Anaphylaxis Venom-honey Bee High 08/12/2015    Anaphylaxis Methadone  08/12/2015    Rash Neurontin [Gabapentin]  08/12/2015    Vertigo Penicillins  08/12/2015    Rash Pt. States he takes Keflex with no difficulties or adverse reactions Trilafon  08/12/2015    Other (comments) Tardive dyskinesia Current Immunizations  Never Reviewed Name Date Tdap 1/20/2017 Not reviewed this visit You Were Diagnosed With   
  
 Codes Comments Migraine without aura and without status migrainosus, not intractable    -  Primary ICD-10-CM: G43.009 ICD-9-CM: 346.10 Vitals BP Pulse Temp Resp Height(growth percentile) Weight(growth percentile) 128/88 75 98.6 °F (37 °C) 16 5' 7\" (1.702 m) 202 lb (91.6 kg) SpO2 BMI Smoking Status 98% 31.64 kg/m2 Current Every Day Smoker Vitals History BMI and BSA Data Body Mass Index Body Surface Area  
 31.64 kg/m 2 2.08 m 2 Preferred Pharmacy Pharmacy Name Phone [id number] [place] [facility] [address] [facility] [address] [phone] Your Updated Medication List  
  
   
This list is accurate as of: 17 12:10 PM.  Always use your most recent med list.  
  
  
  
  
 albuterol 90 mcg/actuation inhaler Commonly known as:  PROVENTIL HFA, VENTOLIN HFA, PROAIR HFA Take 2 Puffs by inhalation every four (4) hours as needed for Wheezing. diazePAM 5 mg tablet Commonly known as:  VALIUM Take 1 Tab by mouth every eight (8) hours as needed (muscle spasm). Max Daily Amount: 15 mg.  
  
 docusate sodium 100 mg capsule Commonly known as:  Harrie Halo Take 1 Cap by mouth two (2) times a day. EPINEPHrine 0.3 mg/0.3 mL injection Commonly known as:  EPIPEN  
0.3 mL by IntraMUSCular route once as needed for up to 1 dose. pantoprazole 40 mg tablet Commonly known as:  PROTONIX Take 1 Tab by mouth daily. PERCOCET 7.5-325 mg per tablet Generic drug:  oxyCODONE-acetaminophen Take  by mouth every four (4) hours as needed for Pain. promethazine 25 mg tablet Commonly known as:  PHENERGAN Take 25 mg by mouth every six (6) hours as needed for Nausea. topiramate 25 mg tablet Commonly known as:  TOPAMAX 1 po qhs  
  
  
  
  
Prescriptions Sent to Pharmacy Refills  
 topiramate (TOPAMAX) 25 mg tablet 6 Si po qhs  
 Class: Normal  
 Pharmacy: [facility][id number] [address] Ph #: [phone] Follow-up Instructions Return in about 2 months (around 3/30/2017). Patient Instructions These headaches by description have a very close analogy to migraines. Will challenge with Topamax at night as a preventative drug and the patient will clear the rescue drug Imitrex by his cardiologist.  He is welcome to keep a headache diary. Suggest stopping smoking. Fortunately the neck does not seem to be a significant contributor to the headache story. Introducing [facility]!    
 [facility] introduces [employer] patient portal. Now you can access parts of your medical record, email your doctor's office, and request medication refills online. 1. In your internet browser, go to [email or web address]! 2. Click on the First Time User? Click Here link in the Sign In box. You will see the New Member Sign Up page. 3. Enter your [employer] Access Code exactly as it appears below. You will not need to use this code after youve completed the sign-up process. If you do not sign up before the expiration date, you must request a new code. · [employer] Access Code: [email or web address] Expires: 4/20/2017 10:52 AM 
 
4. Enter the last four digits of your Social Security Number (xxxx) and Date of Birth (mm/dd/yyyy) as indicated and click Submit. You will be taken to the next sign-up page. 5. Create a [employer] ID. This will be your [employer] login ID and cannot be changed, so think of one that is secure and easy to remember. 6. Create a [employer] password. You can change your password at any time. 7. Enter your Password Reset Question and Answer. This can be used at a later time if you forget your password. 8. Enter your e-mail address. You will receive e-mail notification when new information is available in [address] 9. Click Sign Up. You can now view and download portions of your medical record. 10. Click the Download Summary menu link to download a portable copy of your medical information. If you have questions, please visit the Frequently Asked Questions section of the [employer] website. Remember, [employer] is NOT to be used for urgent needs. For medical emergencies, dial 911. Now available from your iPhone and Android! Please provide this summary of care documentation to your next provider. Your primary care clinician is listed as Sarika Carmona. If you have any questions after today's visit, please call [phone].
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
## 2017-01-30 NOTE — TELEPHONE ENCOUNTER
Rx for Topamax changed to [address] at patient's request.  [facility].   charli

## 2017-02-02 ENCOUNTER — TELEPHONE (OUTPATIENT)
Dept: NEUROLOGY | Age: 52
End: 2017-02-02

## 2017-02-02 ENCOUNTER — OFFICE VISIT (OUTPATIENT)
Dept: CARDIOLOGY CLINIC | Age: 52
End: 2017-02-02

## 2017-02-02 VITALS
HEIGHT: 67 IN | OXYGEN SATURATION: 95 % | SYSTOLIC BLOOD PRESSURE: 134 MMHG | HEART RATE: 92 BPM | WEIGHT: 203.2 LBS | RESPIRATION RATE: 18 BRPM | DIASTOLIC BLOOD PRESSURE: 80 MMHG | BODY MASS INDEX: 31.89 KG/M2

## 2017-02-02 DIAGNOSIS — R60.9 EDEMA, UNSPECIFIED TYPE: Primary | ICD-10-CM

## 2017-02-02 DIAGNOSIS — R06.00 DYSPNEA, UNSPECIFIED TYPE: ICD-10-CM

## 2017-02-02 DIAGNOSIS — R07.89 ATYPICAL CHEST PAIN: ICD-10-CM

## 2017-02-02 RX ORDER — FUROSEMIDE 20 MG/1
TABLET ORAL DAILY
COMMUNITY
End: 2017-02-02 | Stop reason: SDUPTHER

## 2017-02-02 RX ORDER — FUROSEMIDE 20 MG/1
20 TABLET ORAL DAILY
Qty: 30 TAB | Refills: 3 | Status: SHIPPED | OUTPATIENT
Start: 2017-02-02 | End: 2017-05-22 | Stop reason: SDUPTHER

## 2017-02-02 RX ORDER — PANTOPRAZOLE SODIUM 40 MG/1
40 TABLET, DELAYED RELEASE ORAL 2 TIMES DAILY
COMMUNITY
End: 2017-11-15 | Stop reason: SDUPTHER

## 2017-02-02 RX ORDER — SUMATRIPTAN 100 MG/1
100 TABLET, FILM COATED ORAL
Qty: 12 TAB | Refills: 6 | Status: SHIPPED | OUTPATIENT
Start: 2017-02-02 | End: 2017-07-19 | Stop reason: SDUPTHER

## 2017-02-02 NOTE — MR AVS SNAPSHOT
Visit Information Date & Time Provider Department Dept. Phone Encounter #  
 2/2/2017  2:00 PM Casandra Gonzalez MD [facility] Radha Nguyen [phone] [id number] Your Appointments 3/31/2017 10:40 AM  
Follow Up with Eloisa Long MD  
Neurology [facility]) Appt Note: follow up headache  $0CP  charli  1/30/17 [place] [address] [place] [address] [phone] [address] [employer] [address]  
  
    
 5/2/2017 10:40 AM  
ESTABLISHED PATIENT with Casandra Gonzalez MD  
[facility] ([facility]) Appt Note: [address] [facility]  
[address] [facility] [address] [address] [address] [facility] Upcoming Health Maintenance Date Due FOBT Q 1 YEAR AGE 50-75 2/27/2013 DTaP/Tdap/Td series (2 - Td) 1/20/2027 Allergies as of 2/2/2017  Review Complete On: 1/30/2017 By: Eloisa Long MD  
  
 Severity Noted Reaction Type Reactions Codeine High 01/30/2017    Anaphylaxis Venom-honey Bee High 08/12/2015    Anaphylaxis Methadone  08/12/2015    Rash Neurontin [Gabapentin]  08/12/2015    Vertigo Penicillins  08/12/2015    Rash Pt. States he takes Keflex with no difficulties or adverse reactions Trilafon  08/12/2015    Other (comments) Tardive dyskinesia Current Immunizations  Never Reviewed Name Date Tdap 1/20/2017 Not reviewed this visit You Were Diagnosed With   
  
 Codes Comments Edema, unspecified type    -  Primary ICD-10-CM: R60.9 ICD-9-CM: 021. 3 Atypical chest pain     ICD-10-CM: R07.89 ICD-9-CM: 786.59 Vitals BP Pulse Resp Height(growth percentile) Weight(growth percentile) SpO2  
 134/80 (BP 1 Location: Left arm) 92 18 5' 7\" (1.702 m) 203 lb 3.2 oz (92.2 kg) 95% BMI Smoking Status 31.83 kg/m2 Current Every Day Smoker Vitals History BMI and BSA Data Body Mass Index Body Surface Area  
 31.83 kg/m 2 2.09 m 2 Preferred Pharmacy Pharmacy Name Phone [facility] [address] [place] [phone] Your Updated Medication List  
  
   
This list is accurate as of: 2/2/17  2:23 PM.  Always use your most recent med list.  
  
  
  
  
 albuterol 90 mcg/actuation inhaler Commonly known as:  PROVENTIL HFA, VENTOLIN HFA, PROAIR HFA Take 2 Puffs by inhalation every four (4) hours as needed for Wheezing. diazePAM 5 mg tablet Commonly known as:  VALIUM Take 1 Tab by mouth every eight (8) hours as needed (muscle spasm). Max Daily Amount: 15 mg.  
  
 docusate sodium 100 mg capsule Commonly known as:  Lilibeth Dilling Take 1 Cap by mouth two (2) times a day. EPINEPHrine 0.3 mg/0.3 mL injection Commonly known as:  EPIPEN  
0.3 mL by IntraMUSCular route once as needed for up to 1 dose. PERCOCET 7.5-325 mg per tablet Generic drug:  oxyCODONE-acetaminophen Take  by mouth every four (4) hours as needed for Pain. promethazine 25 mg tablet Commonly known as:  PHENERGAN Take 25 mg by mouth every six (6) hours as needed for Nausea. PROTONIX 40 mg tablet Generic drug:  pantoprazole Take 40 mg by mouth two (2) times a day. topiramate 25 mg tablet Commonly known as:  TOPAMAX 1 po qhs Introducing [facility]! [employer] introduces [employer] patient portal. Now you can access parts of your medical record, email your doctor's office, and request medication refills online. 1. In your internet browser, go to [email or web address] 2. Click on the First Time User? Click Here link in the Sign In box. You will see the New Member Sign Up page. 3. Enter your [employer] Access Code exactly as it appears below.  You will not need to use this code after youve completed the sign-up process. If you do not sign up before the expiration date, you must request a new code. · [employer] Access Code: [email or web address] Expires: 4/20/2017 10:52 AM 
 
4. Enter the last four digits of your Social Security Number (xxxx) and Date of Birth (mm/dd/yyyy) as indicated and click Submit. You will be taken to the next sign-up page. 5. Create a [employer] ID. This will be your [employer] login ID and cannot be changed, so think of one that is secure and easy to remember. 6. Create a [employer] password. You can change your password at any time. 7. Enter your Password Reset Question and Answer. This can be used at a later time if you forget your password. 8. Enter your e-mail address. You will receive e-mail notification when new information is available in [address] 9. Click Sign Up. You can now view and download portions of your medical record. 10. Click the Download Summary menu link to download a portable copy of your medical information. If you have questions, please visit the Frequently Asked Questions section of the [employer] website. Remember, [employer] is NOT to be used for urgent needs. For medical emergencies, dial 911. Now available from your iPhone and Android! Please provide this summary of care documentation to your next provider. Your primary care clinician is listed as Sarika Carmona. If you have any questions after today's visit, please call [phone].
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
## 2017-02-03 NOTE — TELEPHONE ENCOUNTER
Pt would like to have call back from you please regarding Pt has some reaction from his med ( pt does not know   the med name ). Pt have confusion and vision problem. The med was recently descried.  thank you

## 2017-02-03 NOTE — TELEPHONE ENCOUNTER
Patient reports feeling drowsy after taking Topamax. Will continue and call if it does not resolved.   charli

## 2017-02-27 ENCOUNTER — OFFICE VISIT (OUTPATIENT)
Dept: FAMILY MEDICINE CLINIC | Age: 52
End: 2017-02-27

## 2017-02-27 VITALS
SYSTOLIC BLOOD PRESSURE: 120 MMHG | HEART RATE: 80 BPM | BODY MASS INDEX: 31.55 KG/M2 | TEMPERATURE: 98 F | WEIGHT: 201 LBS | RESPIRATION RATE: 16 BRPM | DIASTOLIC BLOOD PRESSURE: 82 MMHG | HEIGHT: 67 IN | OXYGEN SATURATION: 99 %
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
## 2017-02-27 RX ORDER — SULFAMETHOXAZOLE AND TRIMETHOPRIM 800; 160 MG/1; MG/1
1 TABLET ORAL 2 TIMES DAILY
Qty: 20 TAB | Refills: 0 | Status: SHIPPED | OUTPATIENT
Start: 2017-02-27 | End: 2017-03-09

## 2017-02-27 NOTE — PROGRESS NOTES
Identified pt with two pt identifiers(name and ). Chief Complaint   Patient presents with   Celeida Zhane 81     left foot x5 days ago        Health Maintenance Due   Topic    FOBT Q 1 YEAR AGE 50-75        Wt Readings from Last 3 Encounters:   17 201 lb (91.2 kg)   17 203 lb 3.2 oz (92.2 kg)   17 202 lb (91.6 kg)     Temp Readings from Last 3 Encounters:   17 98 °F (36.7 °C) (Oral)   17 98.6 °F (37 °C)   17 97.8 °F (36.6 °C) (Oral)     BP Readings from Last 3 Encounters:   17 120/82   17 134/80   17 128/88     Pulse Readings from Last 3 Encounters:   17 80   17 92   17 75         Learning Assessment:  :     Learning Assessment 2016 12/10/2015   PRIMARY LEARNER Patient Patient   HIGHEST LEVEL OF EDUCATION - PRIMARY LEARNER  2 YEARS OF COLLEGE -   CO-LEARNER CAREGIVER No No   PRIMARY LANGUAGE ENGLISH ENGLISH   LEARNER PREFERENCE PRIMARY LISTENING LISTENING   ANSWERED BY patient patient   RELATIONSHIP SELF SELF       Depression Screening:  :     PHQ 2 / 9, over the last two weeks 2017   Little interest or pleasure in doing things Not at all   Feeling down, depressed or hopeless Not at all   Total Score PHQ 2 0           Coordination of Care Questionnaire:  :     1) Have you been to an emergency room, urgent care clinic since your last visit? no   Hospitalized since your last visit? no             2) Have you seen or consulted any other health care providers outside of [address] [place] [address] since your last visit? no  (Include any pap smears or colon screenings in this section.)    3) Do you have an Advance Directive on file? no  Are you interested in receiving information about Advance Directives? no    Patient is accompanied by self I have received verbal consent from Chelle Watters to discuss any/all medical information while they are present in the room.     Reviewed record in preparation for visit and have obtained necessary documentation. Medication reconciliation up to date and corrected with patient at this time.

## 2017-02-27 NOTE — MR AVS SNAPSHOT
Visit Information Date & Time Provider Department Dept. Phone Encounter #  
 2/27/2017  2:45 PM Timmy Johnson  [address] [phone] [id number] Follow-up Instructions Return if symptoms worsen or fail to improve. Your Appointments 3/31/2017 10:40 AM  
Follow Up with Mitchell Coyle MD  
[facility]: follow up headache  $0CP  charli  1/30/17 [place] [address] [facility] [address] [facility] [address] [id number] [phone]  
  
   
 [address] [address] [place] [address]  
  
    
 5/2/2017 10:40 AM  
ESTABLISHED PATIENT with Herman Reeves MD  
[facility] ([facility]) Appt Note: [address] [place] [address] [facility]  
[address] [facility] [place] [address] [address] Upcoming Health Maintenance Date Due FOBT Q 1 YEAR AGE 50-75 2/27/2013 DTaP/Tdap/Td series (2 - Td) 1/20/2027 Allergies as of 2/27/2017  Review Complete On: 2/27/2017 By: Timmy Johnson NP Severity Noted Reaction Type Reactions Codeine High 01/30/2017    Anaphylaxis Venom-honey Bee High 08/12/2015    Anaphylaxis Methadone  08/12/2015    Rash Neurontin [Gabapentin]  08/12/2015    Vertigo Penicillins  08/12/2015    Rash Pt. States he takes Keflex with no difficulties or adverse reactions Trilafon  08/12/2015    Other (comments) Tardive dyskinesia Current Immunizations  Never Reviewed Name Date Tdap 1/20/2017 Not reviewed this visit You Were Diagnosed With   
  
 Codes Comments Insect bite, initial encounter    -  Primary ICD-10-CM: W57Shun Arlene Lay ICD-9-CM: 919.4, E906.4 Vitals BP  
  
  
  
  
  
 120/82 BMI and BSA Data Body Mass Index Body Surface Area  
 31.48 kg/m 2 2.08 m 2 Preferred Pharmacy Pharmacy Name Phone [id number] [address] [facility] [address] [phone] Your Updated Medication List  
  
   
This list is accurate as of: 2/27/17  3:02 PM.  Always use your most recent med list.  
  
  
  
  
 albuterol 90 mcg/actuation inhaler Commonly known as:  PROVENTIL HFA, VENTOLIN HFA, PROAIR HFA Take 2 Puffs by inhalation every four (4) hours as needed for Wheezing. diazePAM 5 mg tablet Commonly known as:  VALIUM Take 1 Tab by mouth every eight (8) hours as needed (muscle spasm). Max Daily Amount: 15 mg.  
  
 docusate sodium 100 mg capsule Commonly known as:  Dorthey Matsu Take 1 Cap by mouth two (2) times a day. EPINEPHrine 0.3 mg/0.3 mL injection Commonly known as:  EPIPEN  
0.3 mL by IntraMUSCular route once as needed for up to 1 dose. furosemide 20 mg tablet Commonly known as:  LASIX Take 1 Tab by mouth daily. PERCOCET 7.5-325 mg per tablet Generic drug:  oxyCODONE-acetaminophen Take  by mouth every four (4) hours as needed for Pain. promethazine 25 mg tablet Commonly known as:  PHENERGAN Take 25 mg by mouth every six (6) hours as needed for Nausea. PROTONIX 40 mg tablet Generic drug:  pantoprazole Take 40 mg by mouth two (2) times a day. SUMAtriptan 100 mg tablet Commonly known as:  IMITREX Take 1 Tab by mouth once as needed for Migraine for up to 1 dose. topiramate 25 mg tablet Commonly known as:  TOPAMAX 1 po qhs  
  
 trimethoprim-sulfamethoxazole 160-800 mg per tablet Commonly known as:  BACTRIM DS, SEPTRA DS Take 1 Tab by mouth two (2) times a day for 10 days. Prescriptions Sent to Pharmacy Refills  
 trimethoprim-sulfamethoxazole (BACTRIM DS, SEPTRA DS) 160-800 mg per tablet 0 Sig: Take 1 Tab by mouth two (2) times a day for 10 days. Class: Normal  
 Pharmacy: [facility]/pharmacy #[id number] - [place] - [address] Ph #: [phone] Route: Oral  
  
We Performed the Following REFERRAL TO PODIATRY [REF90 Custom] Comments:  
 Please evaluate patient for plantars wart Follow-up Instructions Return if symptoms worsen or fail to improve. Referral Information Referral ID Referred By Referred To [id number] Andres James, [address] [facility] [address] Visits Status Start Date End Date 1 New Request 2/27/17 2/27/18 If your referral has a status of pending review or denied, additional information will be sent to support the outcome of this decision. Patient Instructions Insect Stings and Bites: Care Instructions Your Care Instructions Stings and bites from bees, wasps, ants, and other insects often cause pain, swelling, redness, and itching. In some people, especially children, the redness and swelling may be worse. It may extend several inches beyond the affected area. But in most cases, stings and bites don't cause reactions all over the body. If you have had a reaction to an insect sting or bite, you are at risk for a reaction if you get stung or bitten again. Follow-up care is a key part of your treatment and safety. Be sure to make and go to all appointments, and call your doctor if you are having problems. It's also a good idea to know your test results and keep a list of the medicines you take. How can you care for yourself at home? · Do not scratch or rub the skin where the sting or bite occurred. · Put a cold pack or ice cube on the area. Put a thin cloth between the ice and your skin. For some people, a paste of baking soda mixed with a little water helps relieve pain and decrease the reaction. · Take an over-the-counter antihistamine, such as diphenhydramine (Benadryl) or loratadine (Claritin), to relieve swelling, redness, and itching. Calamine lotion or hydrocortisone cream may also help.  Do not give antihistamines to your child unless you have checked with the doctor first. 
· Be safe with medicines. If your doctor prescribed medicine for your allergy, take it exactly as prescribed. Call your doctor if you think you are having a problem with your medicine. You will get more details on the specific medicines your doctor prescribes. · Your doctor may prescribe a shot of epinephrine to carry with you in case you have a severe reaction. Learn how and when to give yourself the shot, and keep it with you at all times. Make sure it has not . · Go to the emergency room anytime you have a severe reaction. Go even if you have given yourself epinephrine and are feeling better. Symptoms can come back. When should you call for help? Call 911 anytime you think you may need emergency care. For example, call if: 
· You have symptoms of a severe allergic reaction. These may include: 
¨ Sudden raised, red areas (hives) all over your body. ¨ Swelling of the throat, mouth, lips, or tongue. ¨ Trouble breathing. ¨ Passing out (losing consciousness). Or you may feel very lightheaded or suddenly feel weak, confused, or restless. Call your doctor now or seek immediate medical care if: 
· You have symptoms of an allergic reaction not right at the sting or bite, such as: ¨ A rash or small area of hives (raised, red areas on the skin). ¨ Itching. ¨ Swelling. ¨ Belly pain, nausea, or vomiting. · You have a lot of swelling around the site (such as your entire arm or leg is swollen). · You have signs of infection, such as: 
¨ Increased pain, swelling, redness, or warmth around the sting. ¨ Red streaks leading from the area. ¨ Pus draining from the sting. ¨ A fever. Watch closely for changes in your health, and be sure to contact your doctor if: 
· You do not get better as expected. Where can you learn more? Go to http://oscar-keely.info/. Enter P390 in the search box to learn more about \"Insect Stings and Bites: Care Instructions. \" Current as of: July 28, 2016 Content Version: 11.1 © 0537-7099 [employer]. Care instructions adapted under license by [employer] (which disclaims liability or warranty for this information). If you have questions about a medical condition or this instruction, always ask your healthcare professional. [address] any warranty or liability for your use of this information. Introducing [facility]! Francy Akhil introduces Sarata patient portal. Now you can access parts of your medical record, email your doctor's office, and request medication refills online. 1. In your internet browser, go to [email or web address] 2. Click on the First Time User? Click Here link in the Sign In box. You will see the New Member Sign Up page. 3. Enter your Sarata Access Code exactly as it appears below. You will not need to use this code after youve completed the sign-up process. If you do not sign up before the expiration date, you must request a new code. · Sarata Access Code: [email or web address] Expires: 4/20/2017 10:52 AM 
 
4. Enter the last four digits of your Social Security Number (xxxx) and Date of Birth (mm/dd/yyyy) as indicated and click Submit. You will be taken to the next sign-up page. 5. Create a Sarata ID. This will be your Sarata login ID and cannot be changed, so think of one that is secure and easy to remember. 6. Create a Sarata password. You can change your password at any time. 7. Enter your Password Reset Question and Answer. This can be used at a later time if you forget your password. 8. Enter your e-mail address. You will receive e-mail notification when new information is available in [address] 9. Click Sign Up. You can now view and download portions of your medical record. 10. Click the Download Summary menu link to download a portable copy of your medical information. If you have questions, please visit the Frequently Asked Questions section of the Connect HQ website. Remember, Connect HQ is NOT to be used for urgent needs. For medical emergencies, dial 911. Now available from your iPhone and Android! Please provide this summary of care documentation to your next provider. Your primary care clinician is listed as Sarika Carmona. If you have any questions after today's visit, please call [phone].

## 2017-02-27 NOTE — PATIENT INSTRUCTIONS
Insect Stings and Bites: Care Instructions  Your Care Instructions  Stings and bites from bees, wasps, ants, and other insects often cause pain, swelling, redness, and itching. In some people, especially children, the redness and swelling may be worse. It may extend several inches beyond the affected area. But in most cases, stings and bites don't cause reactions all over the body. If you have had a reaction to an insect sting or bite, you are at risk for a reaction if you get stung or bitten again. Follow-up care is a key part of your treatment and safety. Be sure to make and go to all appointments, and call your doctor if you are having problems. It's also a good idea to know your test results and keep a list of the medicines you take. How can you care for yourself at home? · Do not scratch or rub the skin where the sting or bite occurred. · Put a cold pack or ice cube on the area. Put a thin cloth between the ice and your skin. For some people, a paste of baking soda mixed with a little water helps relieve pain and decrease the reaction. · Take an over-the-counter antihistamine, such as diphenhydramine (Benadryl) or loratadine (Claritin), to relieve swelling, redness, and itching. Calamine lotion or hydrocortisone cream may also help. Do not give antihistamines to your child unless you have checked with the doctor first.  · Be safe with medicines. If your doctor prescribed medicine for your allergy, take it exactly as prescribed. Call your doctor if you think you are having a problem with your medicine. You will get more details on the specific medicines your doctor prescribes. · Your doctor may prescribe a shot of epinephrine to carry with you in case you have a severe reaction. Learn how and when to give yourself the shot, and keep it with you at all times. Make sure it has not . · Go to the emergency room anytime you have a severe reaction.  Go even if you have given yourself epinephrine and are feeling better. Symptoms can come back. When should you call for help? Call 911 anytime you think you may need emergency care. For example, call if:  · You have symptoms of a severe allergic reaction. These may include:  ¨ Sudden raised, red areas (hives) all over your body. ¨ Swelling of the throat, mouth, lips, or tongue. ¨ Trouble breathing. ¨ Passing out (losing consciousness). Or you may feel very lightheaded or suddenly feel weak, confused, or restless. Call your doctor now or seek immediate medical care if:  · You have symptoms of an allergic reaction not right at the sting or bite, such as:  ¨ A rash or small area of hives (raised, red areas on the skin). ¨ Itching. ¨ Swelling. ¨ Belly pain, nausea, or vomiting. · You have a lot of swelling around the site (such as your entire arm or leg is swollen). · You have signs of infection, such as:  ¨ Increased pain, swelling, redness, or warmth around the sting. ¨ Red streaks leading from the area. ¨ Pus draining from the sting. ¨ A fever. Watch closely for changes in your health, and be sure to contact your doctor if:  · You do not get better as expected. Where can you learn more? Go to http://oscar-keely.info/. Enter P390 in the search box to learn more about \"Insect Stings and Bites: Care Instructions. \"  Current as of: July 28, 2016  Content Version: 11.1  © 0272-6435 [employer]. Care instructions adapted under license by [employer] (which disclaims liability or warranty for this information). If you have questions about a medical condition or this instruction, always ask your healthcare professional. [place] [address] any warranty or liability for your use of this information.

## 2017-02-27 NOTE — PROGRESS NOTES
HISTORY OF PRESENT ILLNESS  Layo Simental is a 45 y.o. male. HPI  Pt presents with \"insect bite on left foot\"    Pt states that about 5 days ago, he noticed that it looked like something bit him on the bottom of his left foot. There was a red, raised area, that he was able to pop. Pt states that it drained a whole bunch of fluid. Clear/yellow drainage from the foot. Since then, there was been some mild erythema and pain in the area. No fever  No swelling of the foot. Pt is able to bear full weight on foot. Review of Systems   Constitutional: Negative for fever. HENT: Negative for congestion. Respiratory: Negative for cough. Gastrointestinal: Negative for diarrhea and vomiting. Physical Exam   Constitutional: He is oriented to person, place, and time. He appears well-developed and well-nourished. HENT:   Head: Normocephalic and atraumatic. Neck: Normal range of motion. Neck supple. Cardiovascular: Normal rate, regular rhythm and normal heart sounds. Pulmonary/Chest: Effort normal and breath sounds normal.   Musculoskeletal:        Feet:    Lymphadenopathy:     He has no cervical adenopathy. Neurological: He is alert and oriented to person, place, and time. Skin: Skin is warm and dry. Psychiatric: He has a normal mood and affect. His behavior is normal.       ASSESSMENT and PLAN    ICD-10-CM ICD-9-CM    1. Insect bite, initial encounter W57. XXXA 919.4 trimethoprim-sulfamethoxazole (BACTRIM DS, SEPTRA DS) 160-800 mg per tablet     E906.4 REFERRAL TO PODIATRY     Informed patient that he should take antibiotics as prescribed, with food. Educated about monitoring area for infection, and keeping area clean and dry. Educated about possible risk of plantars wart, based on presentation. Should symptoms continue, informed patient to call podiatry ASAP. Pt informed to return to office with worsening of symptoms, or PRN with any questions or concerns.   Pt verbalizes understanding of plan of care and denies further questions or concerns at this time.

## 2017-03-14 ENCOUNTER — OFFICE VISIT (OUTPATIENT)
Dept: FAMILY MEDICINE CLINIC | Age: 52
End: 2017-03-14

## 2017-03-14 ENCOUNTER — HOSPITAL ENCOUNTER (OUTPATIENT)
Dept: CT IMAGING | Age: 52
Discharge: HOME | End: 2017-03-14
Attending: NURSE PRACTITIONER
Payer: MEDICARE

## 2017-03-14 DIAGNOSIS — R51.9 WORSENING HEADACHES: Primary | ICD-10-CM

## 2017-03-14 DIAGNOSIS — R51.9 WORSENING HEADACHES: ICD-10-CM

## 2017-03-14 PROCEDURE — 70470 CT HEAD/BRAIN W/O & W/DYE: CPT

## 2017-03-14 PROCEDURE — 74011636320 HC RX REV CODE- 636/320: Performed by: RADIOLOGY

## 2017-03-14 RX ORDER — KETOROLAC TROMETHAMINE 30 MG/ML
60 INJECTION, SOLUTION INTRAMUSCULAR; INTRAVENOUS ONCE
Qty: 1 VIAL | Refills: 0
Start: 2017-03-14 | End: 2017-03-14

## 2017-03-14 RX ADMIN — IOPAMIDOL 100 ML: 612 INJECTION, SOLUTION INTRAVENOUS at 18:37

## 2017-03-14 NOTE — MR AVS SNAPSHOT
Visit Information Date & Time Provider Department Dept. Phone Encounter #  
 3/14/2017  2:45 PM Barak Cotto  [address] [phone] [id number] Follow-up Instructions Return if symptoms worsen or fail to improve. Your Appointments 3/31/2017 10:40 AM  
Follow Up with Joanne De León MD  
[facility]) Appt Note: follow up headache  $0CP  charli  1/30/17 [place] [address] [facility] [address] [place] [id number] [phone]  
  
   
 [address] [employer] [id number]  
  
    
 5/2/2017 10:40 AM  
ESTABLISHED PATIENT with Genet Hyde MD  
[facility] Appt Note: [address] [facility]  
[address] [facility] [address] [place] [address] [facility] [address] Upcoming Health Maintenance Date Due FOBT Q 1 YEAR AGE 50-75 2/27/2013 DTaP/Tdap/Td series (2 - Td) 1/20/2027 Allergies as of 3/14/2017  Review Complete On: 3/14/2017 By: Barak Cotto NP Severity Noted Reaction Type Reactions Codeine High 01/30/2017    Anaphylaxis Venom-honey Bee High 08/12/2015    Anaphylaxis Methadone  08/12/2015    Rash Neurontin [Gabapentin]  08/12/2015    Vertigo Penicillins  08/12/2015    Rash Pt. States he takes Keflex with no difficulties or adverse reactions Trilafon  08/12/2015    Other (comments) Tardive dyskinesia Current Immunizations  Never Reviewed Name Date Tdap 1/20/2017 Not reviewed this visit You Were Diagnosed With   
  
 Codes Comments Worsening headaches    -  Primary ICD-10-CM: R14 ICD-9-CM: 098. 0 Vitals Smoking Status Current Every Day Smoker Preferred Pharmacy Pharmacy Name Phone [id number] - [place] - [address] [facility] [address] [phone] Your Updated Medication List  
  
   
This list is accurate as of: 3/14/17  8:07 PM.  Always use your most recent med list.  
  
  
  
  
 albuterol 90 mcg/actuation inhaler Commonly known as:  PROVENTIL HFA, VENTOLIN HFA, PROAIR HFA Take 2 Puffs by inhalation every four (4) hours as needed for Wheezing. diazePAM 5 mg tablet Commonly known as:  VALIUM Take 1 Tab by mouth every eight (8) hours as needed (muscle spasm). Max Daily Amount: 15 mg.  
  
 docusate sodium 100 mg capsule Commonly known as:  Keshia Peels Take 1 Cap by mouth two (2) times a day. EPINEPHrine 0.3 mg/0.3 mL injection Commonly known as:  EPIPEN  
0.3 mL by IntraMUSCular route once as needed for up to 1 dose. furosemide 20 mg tablet Commonly known as:  LASIX Take 1 Tab by mouth daily. ketorolac tromethamine 60 mg/2 mL Soln Commonly known as:  TORADOL  
2 mL by IntraMUSCular route once for 1 dose. PERCOCET 7.5-325 mg per tablet Generic drug:  oxyCODONE-acetaminophen Take  by mouth every four (4) hours as needed for Pain. promethazine 25 mg tablet Commonly known as:  PHENERGAN Take 25 mg by mouth every six (6) hours as needed for Nausea. PROTONIX 40 mg tablet Generic drug:  pantoprazole Take 40 mg by mouth two (2) times a day. SUMAtriptan 100 mg tablet Commonly known as:  IMITREX Take 1 Tab by mouth once as needed for Migraine for up to 1 dose. topiramate 25 mg tablet Commonly known as:  TOPAMAX 1 po qhs We Performed the Following KETOROLAC TROMETHAMINE INJ [ [facility]] [place] THER/PROPH/DIAG INJECTION, SUBCUT/IM [id number] CPT(R)] Follow-up Instructions Return if symptoms worsen or fail to improve. Patient Instructions Headache: Care Instructions Your Care Instructions Headaches have many possible causes. Most headaches aren't a sign of a more serious problem, and they will get better on their own.  Home treatment may help you feel better faster. The doctor has checked you carefully, but problems can develop later. If you notice any problems or new symptoms, get medical treatment right away. Follow-up care is a key part of your treatment and safety. Be sure to make and go to all appointments, and call your doctor if you are having problems. It's also a good idea to know your test results and keep a list of the medicines you take. How can you care for yourself at home? · Do not drive if you have taken a prescription pain medicine. · Rest in a quiet, dark room until your headache is gone. Close your eyes and try to relax or go to sleep. Don't watch TV or read. · Put a cold, moist cloth or cold pack on the painful area for 10 to 20 minutes at a time. Put a thin cloth between the cold pack and your skin. · Use a warm, moist towel or a heating pad set on low to relax tight shoulder and neck muscles. · Have someone gently massage your neck and shoulders. · Take pain medicines exactly as directed. ¨ If the doctor gave you a prescription medicine for pain, take it as prescribed. ¨ If you are not taking a prescription pain medicine, ask your doctor if you can take an over-the-counter medicine. · Be careful not to take pain medicine more often than the instructions allow, because you may get worse or more frequent headaches when the medicine wears off. · Do not ignore new symptoms that occur with a headache, such as a fever, weakness or numbness, vision changes, or confusion. These may be signs of a more serious problem. To prevent headaches · Keep a headache diary so you can figure out what triggers your headaches. Avoiding triggers may help you prevent headaches. Record when each headache began, how long it lasted, and what the pain was like (throbbing, aching, stabbing, or dull).  Write down any other symptoms you had with the headache, such as nausea, flashing lights or dark spots, or sensitivity to bright light or loud noise. Note if the headache occurred near your period. List anything that might have triggered the headache, such as certain foods (chocolate, cheese, wine) or odors, smoke, bright light, stress, or lack of sleep. · Find healthy ways to deal with stress. Headaches are most common during or right after stressful times. Take time to relax before and after you do something that has caused a headache in the past. 
· Try to keep your muscles relaxed by keeping good posture. Check your jaw, face, neck, and shoulder muscles for tension, and try relaxing them. When sitting at a desk, change positions often, and stretch for 30 seconds each hour. · Get plenty of sleep and exercise. · Eat regularly and well. Long periods without food can trigger a headache. · Treat yourself to a massage. Some people find that regular massages are very helpful in relieving tension. · Limit caffeine by not drinking too much coffee, tea, or soda. But don't quit caffeine suddenly, because that can also give you headaches. · Reduce eyestrain from computers by blinking frequently and looking away from the computer screen every so often. Make sure you have proper eyewear and that your monitor is set up properly, about an arm's length away. · Seek help if you have depression or anxiety. Your headaches may be linked to these conditions. Treatment can both prevent headaches and help with symptoms of anxiety or depression. When should you call for help? Call 911 anytime you think you may need emergency care. For example, call if: 
· You have signs of a stroke. These may include: 
¨ Sudden numbness, paralysis, or weakness in your face, arm, or leg, especially on only one side of your body. ¨ Sudden vision changes. ¨ Sudden trouble speaking. ¨ Sudden confusion or trouble understanding simple statements. ¨ Sudden problems with walking or balance. ¨ A sudden, severe headache that is different from past headaches. Call your doctor now or seek immediate medical care if: 
· You have a new or worse headache. · Your headache gets much worse. Where can you learn more? Go to http://oscar-keely.info/. Enter M271 in the search box to learn more about \"Headache: Care Instructions. \" Current as of: February 19, 2016 Content Version: 11.1 © 1474-2486 [employer]. Care instructions adapted under license by [employer] (which disclaims liability or warranty for this information). If you have questions about a medical condition or this instruction, always ask your healthcare professional. [address] any warranty or liability for your use of this information. Introducing [facility]! [facility] introduces [employer] patient portal. Now you can access parts of your medical record, email your doctor's office, and request medication refills online. 1. In your internet browser, go to [email or web address] 2. Click on the First Time User? Click Here link in the Sign In box. You will see the New Member Sign Up page. 3. Enter your [employer] Access Code exactly as it appears below. You will not need to use this code after youve completed the sign-up process. If you do not sign up before the expiration date, you must request a new code. · [employer] Access Code: [email or web address] Expires: 4/20/2017 11:52 AM 
 
4. Enter the last four digits of your Social Security Number (xxxx) and Date of Birth (mm/dd/yyyy) as indicated and click Submit. You will be taken to the next sign-up page. 5. Create a [employer] ID. This will be your [employer] login ID and cannot be changed, so think of one that is secure and easy to remember. 6. Create a [employer] password. You can change your password at any time. 7. Enter your Password Reset Question and Answer.  This can be used at a later time if you forget your password. 8. Enter your e-mail address. You will receive e-mail notification when new information is available in [address] 9. Click Sign Up. You can now view and download portions of your medical record. 10. Click the Download Summary menu link to download a portable copy of your medical information. If you have questions, please visit the Frequently Asked Questions section of the [employer] website. Remember, [employer] is NOT to be used for urgent needs. For medical emergencies, dial 911. Now available from your iPhone and Android! Please provide this summary of care documentation to your next provider. Your primary care clinician is listed as Sarika Carmona. If you have any questions after today's visit, please call [phone].

## 2017-03-15 ENCOUNTER — TELEPHONE (OUTPATIENT)
Dept: FAMILY MEDICINE CLINIC | Age: 52
End: 2017-03-15

## 2017-03-15 ENCOUNTER — TELEPHONE (OUTPATIENT)
Dept: NEUROLOGY | Age: 52
End: 2017-03-15

## 2017-03-15 VITALS
DIASTOLIC BLOOD PRESSURE: 82 MMHG | WEIGHT: 204 LBS | TEMPERATURE: 98.2 F | RESPIRATION RATE: 16 BRPM | OXYGEN SATURATION: 99 % | HEIGHT: 67 IN | BODY MASS INDEX: 32.02 KG/M2 | HEART RATE: 78 BPM | SYSTOLIC BLOOD PRESSURE: 130 MMHG

## 2017-03-15 DIAGNOSIS — J01.00 ACUTE NON-RECURRENT MAXILLARY SINUSITIS: Primary | ICD-10-CM

## 2017-03-15 RX ORDER — AZITHROMYCIN 250 MG/1
TABLET, FILM COATED ORAL
Qty: 6 TAB | Refills: 0 | Status: SHIPPED | OUTPATIENT
Start: 2017-03-15 | End: 2017-03-20

## 2017-03-15 RX ORDER — TOPIRAMATE 25 MG/1
TABLET ORAL
Qty: 60 TAB | Refills: 6 | Status: SHIPPED | OUTPATIENT
Start: 2017-03-15 | End: 2017-04-14 | Stop reason: ALTCHOICE

## 2017-03-15 NOTE — PROGRESS NOTES
Identified pt with two pt identifiers(name and ). Chief Complaint   Patient presents with    Headache        Health Maintenance Due   Topic    FOBT Q 1 YEAR AGE 50-75        Wt Readings from Last 3 Encounters:   03/15/17 204 lb (92.5 kg)   17 201 lb (91.2 kg)   17 203 lb 3.2 oz (92.2 kg)     Temp Readings from Last 3 Encounters:   03/15/17 98.2 °F (36.8 °C) (Oral)   17 98 °F (36.7 °C) (Oral)   17 98.6 °F (37 °C)     BP Readings from Last 3 Encounters:   03/15/17 130/82   17 120/82   17 134/80     Pulse Readings from Last 3 Encounters:   03/15/17 78   17 80   17 92         Learning Assessment:  :     Learning Assessment 2016 12/10/2015   PRIMARY LEARNER Patient Patient   HIGHEST LEVEL OF EDUCATION - PRIMARY LEARNER  2 YEARS OF COLLEGE -   CO-LEARNER CAREGIVER No No   PRIMARY LANGUAGE ENGLISH ENGLISH   LEARNER PREFERENCE PRIMARY LISTENING LISTENING   ANSWERED BY patient patient   RELATIONSHIP SELF SELF       Depression Screening:  :     PHQ 2 / 9, over the last two weeks 3/15/2017   Little interest or pleasure in doing things Not at all   Feeling down, depressed or hopeless Not at all   Total Score PHQ 2 0           Coordination of Care Questionnaire:  :     1) Have you been to an emergency room, urgent care clinic since your last visit? No  Hospitalized since your last visit? No          2) Have you seen or consulted any other health care providers outside of [address] [place] [address] since your last visit? No    3) Do you have an Advance Directive on file? No    Are you interested in receiving information about Advance Directives?no    Patient is accompanied by self I have received verbal consent from Sanya Han to discuss any/all medical information while they are present in the room. Reviewed record in preparation for visit and have obtained necessary documentation. Medication reconciliation up to date and corrected with patient at this time.

## 2017-03-15 NOTE — TELEPHONE ENCOUNTER
----- Message from Darlene Walsh NP sent at 3/15/2017  8:02 AM EDT -----  Please call patient and let him know that his CT was normal.  It did show a sinus infection, and I have sent a z pack for this. He should take as prescribed. He should follow up with neurology, as well. Thanks!

## 2017-03-15 NOTE — TELEPHONE ENCOUNTER
Patient reports that he has been having 4-6 headache days per week recently. Pain unrelieved by Imitrex. Pain radiates from neck, around head and into eyeball resulting in eye pressure. Patient saw PCP and received a Toradol injection with some relief obtained.   Dr Antelmo Barnes made aware of patient's C/O.  charli

## 2017-03-15 NOTE — PROGRESS NOTES
HISTORY OF PRESENT ILLNESS  Seth Campoverde is a 45 y.o. male. HPI  Pt presents with \"worsening headaches\"    Pt was seen in December, for headaches that were becoming more frequent, and patient was referred to Dr. Pranav Spear at that time. Pt was seen by Dr. Dre Yepez, who believed that he was having migraines. Pt was started on Imitrex and Topamax, and is supposed to follow up this month. Pt states that his migrianes are worse, and the medication does not seem to be working. Pt states that he seems to be getting 3-4 headaches a week, and nothing is helping them  Pt states that he has taken the Immitrex, and Vicodin, without any benefit. Pt's headache is continuing to be on the right side of his head, which is usual for his migraines    Light sensitivity is noted with the headaches. Eye is watering and burning at times, due to the pain around the eye with the headaches. No visual changes  No dizziness  Review of Systems   Constitutional: Negative for fever. Gastrointestinal: Negative for diarrhea, nausea and vomiting. Neurological: Positive for headaches. Physical Exam   Constitutional: He is oriented to person, place, and time. He appears well-developed and well-nourished. HENT:   Head: Normocephalic and atraumatic. Eyes: EOM are normal. Pupils are equal, round, and reactive to light. Neck: Normal range of motion. Neck supple. Cardiovascular: Normal rate, regular rhythm and normal heart sounds. Pulmonary/Chest: Effort normal and breath sounds normal.   Lymphadenopathy:     He has no cervical adenopathy. Neurological: He is alert and oriented to person, place, and time. He has normal strength and normal reflexes. Skin: Skin is warm and dry. Psychiatric: He has a normal mood and affect. His behavior is normal.       ASSESSMENT and PLAN    ICD-10-CM ICD-9-CM    1. Worsening headaches R51 784. 0      Informed patient that he should call Dr. Melvin Batres office, for appointment ASAP.   Educated about always going to ER with worsening of symptoms, worse headache of his life, blurred vision,etc.  Informed patient that the  will be calling him in regards to CT of head ASAP. Pt informed to return to office with worsening of symptoms, or PRN with any questions or concerns. Pt verbalizes understanding of plan of care and denies further questions or concerns at this time.

## 2017-03-15 NOTE — PROGRESS NOTES
Please call patient and let him know that his CT was normal.  It did show a sinus infection, and I have sent a z pack for this. He should take as prescribed. He should follow up with neurology, as well. Thanks!

## 2017-03-15 NOTE — PATIENT INSTRUCTIONS

## 2017-03-15 NOTE — TELEPHONE ENCOUNTER
pt's migraines are getting worse, and Rx is not helping. Pt went to er and had cortizone and CT scan. Please call.

## 2017-04-14 ENCOUNTER — OFFICE VISIT (OUTPATIENT)
Dept: FAMILY MEDICINE CLINIC | Age: 52
End: 2017-04-14

## 2017-04-14 VITALS
DIASTOLIC BLOOD PRESSURE: 90 MMHG | SYSTOLIC BLOOD PRESSURE: 129 MMHG | TEMPERATURE: 98.1 F | OXYGEN SATURATION: 98 % | RESPIRATION RATE: 16 BRPM | BODY MASS INDEX: 31.55 KG/M2 | HEIGHT: 67 IN | HEART RATE: 83 BPM | WEIGHT: 201 LBS

## 2017-04-14 DIAGNOSIS — M54.50 ACUTE BILATERAL LOW BACK PAIN WITHOUT SCIATICA: Primary | ICD-10-CM

## 2017-04-14 RX ORDER — TOPIRAMATE 50 MG/1
50 TABLET, FILM COATED ORAL 2 TIMES DAILY
COMMUNITY
End: 2017-07-19

## 2017-04-14 RX ORDER — BUTALBITAL, ACETAMINOPHEN AND CAFFEINE 50; 325; 40 MG/1; MG/1; MG/1
1 TABLET ORAL
Refills: 0 | COMMUNITY
Start: 2017-01-18 | End: 2017-05-02

## 2017-04-14 RX ORDER — MUPIROCIN 20 MG/G
OINTMENT TOPICAL
Refills: 0 | COMMUNITY
Start: 2017-01-20 | End: 2017-07-19

## 2017-04-14 RX ORDER — CYCLOBENZAPRINE HCL 10 MG
10 TABLET ORAL
Qty: 30 TAB | Refills: 1 | Status: SHIPPED | OUTPATIENT
Start: 2017-04-14 | End: 2018-06-05

## 2017-04-14 NOTE — MR AVS SNAPSHOT
Visit Information Date & Time Provider Department Dept. Phone Encounter #  
 4/14/2017  1:45 PM Zia Jaime  [address] [phone] [id number] Follow-up Instructions Return if symptoms worsen or fail to improve. Your Appointments 5/2/2017 10:40 AM  
ESTABLISHED PATIENT with Beth Villasenor MD  
[facility] ([address]) Appt Note: 3 [place] [facility] [address] [id number] [phone]  
  
   
 [address] [address]  
  
    
 5/5/2017  9:40 AM  
Follow Up with Leonides Quezada MD  
Neurology [facility] ([address]) Appt Note: follow up headache  $0CP  charli  1/30/17; r/s  
 [place] [address] [facility] 99 [id number] [phone]  
  
   
 [place] [address] [id number] [address] Upcoming Health Maintenance Date Due FOBT Q 1 YEAR AGE 50-75 2/27/2013 DTaP/Tdap/Td series (2 - Td) 1/20/2027 Allergies as of 4/14/2017  Review Complete On: 4/14/2017 By: Zia Jaime NP Severity Noted Reaction Type Reactions Codeine High 01/30/2017    Anaphylaxis Venom-honey Bee High 08/12/2015    Anaphylaxis Methadone  08/12/2015    Rash Neurontin [Gabapentin]  08/12/2015    Vertigo Penicillins  08/12/2015    Rash Pt. States he takes Keflex with no difficulties or adverse reactions Trilafon  08/12/2015    Other (comments) Tardive dyskinesia Current Immunizations  Never Reviewed Name Date Tdap 1/20/2017 Not reviewed this visit You Were Diagnosed With   
  
 Codes Comments Acute bilateral low back pain without sciatica    -  Primary ICD-10-CM: M54.5 ICD-9-CM: 724.2, 338.19 Vitals BP Pulse Temp Resp Height(growth percentile) Weight(growth percentile)  129/90 (BP 1 Location: Right arm, BP Patient Position: Sitting) 83 98.1 °F (36.7 °C) (Oral) 16 5' 7\" (1.702 m) 201 lb (91.2 kg) SpO2 BMI Smoking Status 98% 31.48 kg/m2 Current Every Day Smoker BMI and BSA Data Body Mass Index Body Surface Area  
 31.48 kg/m 2 2.08 m 2 Preferred Pharmacy Pharmacy Name Phone [facility] #[id number] - [place] - [address] [phone] Your Updated Medication List  
  
   
This list is accurate as of: 4/14/17  2:29 PM.  Always use your most recent med list.  
  
  
  
  
 butalbital-acetaminophen-caffeine -40 mg per tablet Commonly known as:  Karen [place] Take 1 Tab by mouth every four (4) hours as needed. cyclobenzaprine 10 mg tablet Commonly known as:  FLEXERIL Take 1 Tab by mouth three (3) times daily as needed for Muscle Spasm(s). EPIPEN 2-ERINN 0.3 mg/0.3 mL injection Generic drug:  EPINEPHrine INJECT 0.3 ML INTRAMUSCULARLY ONCE AS NEEDED FOR UP TO ONE DOSE  
  
 furosemide 20 mg tablet Commonly known as:  LASIX Take 1 Tab by mouth daily. mupirocin 2 % ointment Commonly known as:  BACTROBAN  
APPLY TO AFFECTED AREA TWO (2) TIMES A DAY. promethazine 25 mg tablet Commonly known as:  PHENERGAN Take 25 mg by mouth every six (6) hours as needed for Nausea. PROTONIX 40 mg tablet Generic drug:  pantoprazole Take 40 mg by mouth two (2) times a day. SUMAtriptan 100 mg tablet Commonly known as:  IMITREX Take 1 Tab by mouth once as needed for Migraine for up to 1 dose. topiramate 50 mg tablet Commonly known as:  TOPAMAX Take 50 mg by mouth two (2) times a day. VENTOLIN HFA 90 mcg/actuation inhaler Generic drug:  albuterol TAKE 1 PUFF BY INHALATION EVERY FOUR (4) HOURS AS NEEDED FOR WHEEZING. Prescriptions Sent to Pharmacy Refills  
 cyclobenzaprine (FLEXERIL) 10 mg tablet 1 Sig: Take 1 Tab by mouth three (3) times daily as needed for Muscle Spasm(s).   
 Class: Normal  
 Pharmacy: [facility]/pharmacy [address], [facility] - [address]  #: [phone] Route: Oral  
  
Follow-up Instructions Return if symptoms worsen or fail to improve. Patient Instructions Back Pain: Care Instructions Your Care Instructions Back pain has many possible causes. It is often related to problems with muscles and ligaments of the back. It may also be related to problems with the nerves, discs, or bones of the back. Moving, lifting, standing, sitting, or sleeping in an awkward way can strain the back. Sometimes you don't notice the injury until later. Arthritis is another common cause of back pain. Although it may hurt a lot, back pain usually improves on its own within several weeks. Most people recover in 12 weeks or less. Using good home treatment and being careful not to stress your back can help you feel better sooner. Follow-up care is a key part of your treatment and safety. Be sure to make and go to all appointments, and call your doctor if you are having problems. Its also a good idea to know your test results and keep a list of the medicines you take. How can you care for yourself at home? · Sit or lie in positions that are most comfortable and reduce your pain. Try one of these positions when you lie down: ¨ Lie on your back with your knees bent and supported by large pillows. ¨ Lie on the floor with your legs on the seat of a sofa or chair. Donnise Falter on your side with your knees and hips bent and a pillow between your legs. ¨ Lie on your stomach if it does not make pain worse. · Do not sit up in bed, and avoid soft couches and twisted positions. Bed rest can help relieve pain at first, but it delays healing. Avoid bed rest after the first day of back pain. · Change positions every 30 minutes. If you must sit for long periods of time, take breaks from sitting. Get up and walk around, or lie in a comfortable position. · Try using a heating pad on a low or medium setting for 15 to 20 minutes every 2 or 3 hours. Try a warm shower in place of one session with the heating pad. · You can also try an ice pack for 10 to 15 minutes every 2 to 3 hours. Put a thin cloth between the ice pack and your skin. · Take pain medicines exactly as directed. ¨ If the doctor gave you a prescription medicine for pain, take it as prescribed. ¨ If you are not taking a prescription pain medicine, ask your doctor if you can take an over-the-counter medicine. · Take short walks several times a day. You can start with 5 to 10 minutes, 3 or 4 times a day, and work up to longer walks. Walk on level surfaces and avoid hills and stairs until your back is better. · Return to work and other activities as soon as you can. Continued rest without activity is usually not good for your back. · To prevent future back pain, do exercises to stretch and strengthen your back and stomach. Learn how to use good posture, safe lifting techniques, and proper body mechanics. When should you call for help? Call your doctor now or seek immediate medical care if: 
· You have new or worsening numbness in your legs. · You have new or worsening weakness in your legs. (This could make it hard to stand up.) · You lose control of your bladder or bowels. Watch closely for changes in your health, and be sure to contact your doctor if: 
· Your pain gets worse. · You are not getting better after 2 weeks. Where can you learn more? Go to http://oscar-keely.info/. Enter Z407 in the search box to learn more about \"Back Pain: Care Instructions. \" Current as of: May 23, 2016 Content Version: 11.2 © 0646-6890 [employer]. Care instructions adapted under license by [employer] (which disclaims liability or warranty for this information).  If you have questions about a medical condition or this instruction, always ask your healthcare professional. [facility] any warranty or liability for your use of this information. Introducing [facility] HE[facility]! Ismael Ramírez introduces [employer] patient portal. Now you can access parts of your medical record, email your doctor's office, and request medication refills online. 1. In your internet browser, go to [email or web address] 2. Click on the First Time User? Click Here link in the Sign In box. You will see the New Member Sign Up page. 3. Enter your [employer] Access Code exactly as it appears below. You will not need to use this code after youve completed the sign-up process. If you do not sign up before the expiration date, you must request a new code. · [employer] Access Code: [email or web address] Expires: 4/20/2017 11:52 AM 
 
4. Enter the last four digits of your Social Security Number (xxxx) and Date of Birth (mm/dd/yyyy) as indicated and click Submit. You will be taken to the next sign-up page. 5. Create a [employer]s ID. This will be your [employer] login ID and cannot be changed, so think of one that is secure and easy to remember. 6. Create a [employer] password. You can change your password at any time. 7. Enter your Password Reset Question and Answer. This can be used at a later time if you forget your password. 8. Enter your e-mail address. You will receive e-mail notification when new information is available in [address] 9. Click Sign Up. You can now view and download portions of your medical record. 10. Click the Download Summary menu link to download a portable copy of your medical information. If you have questions, please visit the Frequently Asked Questions section of the [employer] website. Remember, [employer] is NOT to be used for urgent needs. For medical emergencies, dial 911. Now available from your iPhone and Android! Please provide this summary of care documentation to your next provider. Your primary care clinician is listed as Sarika Carmona. If you have any questions after today's visit, please call [phone].

## 2017-04-14 NOTE — PROGRESS NOTES
HISTORY OF PRESENT ILLNESS  Jess Guzman is a 45 y.o. male. HPI  Pt presents with \"medication refills\"    Pt just needs refills of Flexeril. He thought he needed refills of his Albuterol and epi-pen, but I had reordered these this morning. Pt would like to change from Valium, to Flexeril, as he feels like he gets better relief with this. Pt has completed all of his follow up with Dr. Talia Alvarado, but has continued to do PT. Most of his muscle spasms are in his lower back, and have continued. Dr Talia Alvarado gave him the information for a pain specialist, that focuses on the facet joint, but he is unsure what his name is at this time. No other concerns or complaints at this time. Review of Systems   Constitutional: Negative for fever. HENT: Negative for congestion. Respiratory: Negative for cough. Gastrointestinal: Negative for diarrhea and vomiting. Musculoskeletal: Positive for back pain. Physical Exam   Constitutional: He is oriented to person, place, and time. He appears well-developed and well-nourished. HENT:   Head: Normocephalic and atraumatic. Neck: Normal range of motion. Neck supple. Cardiovascular: Normal rate, regular rhythm and normal heart sounds. Pulmonary/Chest: Effort normal and breath sounds normal.   Lymphadenopathy:     He has no cervical adenopathy. Neurological: He is alert and oriented to person, place, and time. Skin: Skin is warm and dry. Psychiatric: He has a normal mood and affect. His behavior is normal.       ASSESSMENT and PLAN    ICD-10-CM ICD-9-CM    1. Acute bilateral low back pain without sciatica M54.5 724.2 cyclobenzaprine (FLEXERIL) 10 mg tablet     338.19      Educated about taking medications as prescribed, and the importance of not taking the Valium with the Flexeril. Educated about continuing PT, to aid in back spasms and neck pain. Pt informed to return to office with worsening of symptoms, or PRN with any questions or concerns.   Pt verbalizes understanding of plan of care and denies further questions or concerns at this time.

## 2017-04-14 NOTE — PATIENT INSTRUCTIONS
Back Pain: Care Instructions  Your Care Instructions    Back pain has many possible causes. It is often related to problems with muscles and ligaments of the back. It may also be related to problems with the nerves, discs, or bones of the back. Moving, lifting, standing, sitting, or sleeping in an awkward way can strain the back. Sometimes you don't notice the injury until later. Arthritis is another common cause of back pain. Although it may hurt a lot, back pain usually improves on its own within several weeks. Most people recover in 12 weeks or less. Using good home treatment and being careful not to stress your back can help you feel better sooner. Follow-up care is a key part of your treatment and safety. Be sure to make and go to all appointments, and call your doctor if you are having problems. Its also a good idea to know your test results and keep a list of the medicines you take. How can you care for yourself at home? · Sit or lie in positions that are most comfortable and reduce your pain. Try one of these positions when you lie down:  ¨ Lie on your back with your knees bent and supported by large pillows. ¨ Lie on the floor with your legs on the seat of a sofa or chair. Pete [place] on your side with your knees and hips bent and a pillow between your legs. ¨ Lie on your stomach if it does not make pain worse. · Do not sit up in bed, and avoid soft couches and twisted positions. Bed rest can help relieve pain at first, but it delays healing. Avoid bed rest after the first day of back pain. · Change positions every 30 minutes. If you must sit for long periods of time, take breaks from sitting. Get up and walk around, or lie in a comfortable position. · Try using a heating pad on a low or medium setting for 15 to 20 minutes every 2 or 3 hours. Try a warm shower in place of one session with the heating pad. · You can also try an ice pack for 10 to 15 minutes every 2 to 3 hours.  Put a thin cloth between the ice pack and your skin. · Take pain medicines exactly as directed. ¨ If the doctor gave you a prescription medicine for pain, take it as prescribed. ¨ If you are not taking a prescription pain medicine, ask your doctor if you can take an over-the-counter medicine. · Take short walks several times a day. You can start with 5 to 10 minutes, 3 or 4 times a day, and work up to longer walks. Walk on level surfaces and avoid hills and stairs until your back is better. · Return to work and other activities as soon as you can. Continued rest without activity is usually not good for your back. · To prevent future back pain, do exercises to stretch and strengthen your back and stomach. Learn how to use good posture, safe lifting techniques, and proper body mechanics. When should you call for help? Call your doctor now or seek immediate medical care if:  · You have new or worsening numbness in your legs. · You have new or worsening weakness in your legs. (This could make it hard to stand up.)  · You lose control of your bladder or bowels. Watch closely for changes in your health, and be sure to contact your doctor if:  · Your pain gets worse. · You are not getting better after 2 weeks. Where can you learn more? Go to http://oscar-keely.info/. Enter L963 in the search box to learn more about \"Back Pain: Care Instructions. \"  Current as of: May 23, 2016  Content Version: 11.2  © 3292-4666 Healthwise, Incorporated. Care instructions adapted under license by [employer] (which disclaims liability or warranty for this information). If you have questions about a medical condition or this instruction, always ask your healthcare professional. [address] any warranty or liability for your use of this information.

## 2017-04-14 NOTE — PROGRESS NOTES
Identified pt with two pt identifiers(name and ). Chief Complaint   Patient presents with    Spasms     was released from surgeon and valium was d/c'ed  - he would like to resume using flexeril    Medication Refill        Health Maintenance Due   Topic    FOBT Q 1 YEAR AGE 50-75        Wt Readings from Last 3 Encounters:   17 201 lb (91.2 kg)   03/15/17 204 lb (92.5 kg)   17 201 lb (91.2 kg)     Temp Readings from Last 3 Encounters:   17 98.1 °F (36.7 °C) (Oral)   03/15/17 98.2 °F (36.8 °C) (Oral)   17 98 °F (36.7 °C) (Oral)     BP Readings from Last 3 Encounters:   17 129/90   03/15/17 130/82   17 120/82     Pulse Readings from Last 3 Encounters:   17 83   03/15/17 78   17 80         Learning Assessment:  :     Learning Assessment 2016 12/10/2015   PRIMARY LEARNER Patient Patient   HIGHEST LEVEL OF EDUCATION - PRIMARY LEARNER  2 YEARS OF COLLEGE -   CO-LEARNER CAREGIVER No No   PRIMARY LANGUAGE ENGLISH ENGLISH   LEARNER PREFERENCE PRIMARY LISTENING LISTENING   ANSWERED BY patient patient   RELATIONSHIP SELF SELF       Depression Screening:  :     PHQ 2 / 9, over the last two weeks 3/15/2017   Little interest or pleasure in doing things Not at all   Feeling down, depressed or hopeless Not at all   Total Score PHQ 2 0       Abuse Screening:  :     Abuse Screening Questionnaire 2017   Do you ever feel afraid of your partner? N   Are you in a relationship with someone who physically or mentally threatens you? N   Is it safe for you to go home?  Y           Coordination of Care Questionnaire:  :     1) Have you been to an emergency room, urgent care clinic since your last visit? no   Hospitalized since your last visit? no             2) Have you seen or consulted any other health care providers outside of [address] [place] [address] since your last visit? no  (Include any pap smears or colon screenings in this section.)    3) Do you have an Advance Directive on file? no  Are you interested in receiving information about Advance Directives? no    Patient is accompanied by self. Reviewed record in preparation for visit and have obtained necessary documentation. Medication reconciliation up to date and corrected with patient at this time.

## 2017-05-02 ENCOUNTER — OFFICE VISIT (OUTPATIENT)
Dept: CARDIOLOGY CLINIC | Age: 52
End: 2017-05-02

## 2017-05-02 VITALS
HEART RATE: 68 BPM | HEIGHT: 67 IN | RESPIRATION RATE: 18 BRPM | SYSTOLIC BLOOD PRESSURE: 112 MMHG | OXYGEN SATURATION: 96 % | WEIGHT: 199.4 LBS | DIASTOLIC BLOOD PRESSURE: 78 MMHG | BODY MASS INDEX: 31.3 KG/M2

## 2017-05-02 DIAGNOSIS — R06.00 DYSPNEA, UNSPECIFIED TYPE: ICD-10-CM

## 2017-05-02 DIAGNOSIS — R07.9 CHEST PAIN, UNSPECIFIED TYPE: Primary | ICD-10-CM

## 2017-05-02 DIAGNOSIS — K21.9 GASTROESOPHAGEAL REFLUX DISEASE, ESOPHAGITIS PRESENCE NOT SPECIFIED: ICD-10-CM
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
## 2017-05-02 NOTE — MR AVS SNAPSHOT
Visit Information Date & Time Provider Department Dept. Phone Encounter #  
 5/2/2017 10:40 AM Lexi Mckee MD [facility] [phone] [id number] Your Appointments 5/5/2017  9:40 AM  
Follow Up with Chava Parker MD  
[facility] Appt Note: follow up headache  $0CP  charli  1/30/17; r/s  
 [place] [address] [facility] [address] [place] [id number] [phone]  
  
   
 [place] [address] [facility] [[email or web address] [id number] [address] [place] Upcoming Health Maintenance Date Due FOBT Q 1 YEAR AGE 50-75 2/27/2013 INFLUENZA AGE 9 TO ADULT 8/1/2017 DTaP/Tdap/Td series (2 - Td) 1/20/2027 Allergies as of 5/2/2017  Review Complete On: 4/14/2017 By: Katherine Hill NP Severity Noted Reaction Type Reactions Codeine High 01/30/2017    Anaphylaxis Venom-honey Bee High 08/12/2015    Anaphylaxis Methadone  08/12/2015    Rash Neurontin [Gabapentin]  08/12/2015    Vertigo Penicillins  08/12/2015    Rash Pt. States he takes Keflex with no difficulties or adverse reactions Trilafon  08/12/2015    Other (comments) Tardive dyskinesia Current Immunizations  Never Reviewed Name Date Tdap 1/20/2017 Not reviewed this visit Vitals BP Pulse Resp Height(growth percentile) Weight(growth percentile) SpO2  
 112/78 (BP 1 Location: Left arm) 68 18 5' 7\" (1.702 m) 199 lb 6.4 oz (90.4 kg) 96% BMI Smoking Status 31.23 kg/m2 Current Every Day Smoker Vitals History BMI and BSA Data Body Mass Index Body Surface Area  
 31.23 kg/m 2 2.07 m 2 Preferred Pharmacy Pharmacy Name Phone [facility]ARMACY #[id number] - [place] [address] [facility] [address] [phone] Your Updated Medication List  
  
   
This list is accurate as of: 5/2/17 11:43 AM.  Always use your most recent med list.  
  
  
  
  
 cyclobenzaprine 10 mg tablet Commonly known as:  FLEXERIL Take 1 Tab by mouth three (3) times daily as needed for Muscle Spasm(s). EPIPEN 2-ERINN 0.3 mg/0.3 mL injection Generic drug:  EPINEPHrine INJECT 0.3 ML INTRAMUSCULARLY ONCE AS NEEDED FOR UP TO ONE DOSE  
  
 furosemide 20 mg tablet Commonly known as:  LASIX Take 1 Tab by mouth daily. mupirocin 2 % ointment Commonly known as:  BACTROBAN  
APPLY TO AFFECTED AREA TWO (2) TIMES A DAY. promethazine 25 mg tablet Commonly known as:  PHENERGAN Take 25 mg by mouth every six (6) hours as needed for Nausea. PROTONIX 40 mg tablet Generic drug:  pantoprazole Take 40 mg by mouth two (2) times a day. SUMAtriptan 100 mg tablet Commonly known as:  IMITREX Take 1 Tab by mouth once as needed for Migraine for up to 1 dose. topiramate 50 mg tablet Commonly known as:  TOPAMAX Take 50 mg by mouth two (2) times a day. VENTOLIN HFA 90 mcg/actuation inhaler Generic drug:  albuterol TAKE 1 PUFF BY INHALATION EVERY FOUR (4) HOURS AS NEEDED FOR WHEEZING. Introducing [facility]! Tanis Epley introduces PAYMEY patient portal. Now you can access parts of your medical record, email your doctor's office, and request medication refills online. 1. In your internet browser, go to [email or web address] 2. Click on the First Time User? Click Here link in the Sign In box. You will see the New Member Sign Up page. 3. Enter your PAYMEY Access Code exactly as it appears below. You will not need to use this code after youve completed the sign-up process. If you do not sign up before the expiration date, you must request a new code. · PAYMEY Access Code: [email or web address] Expires: 7/29/2017  5:43 PM 
 
4. Enter the last four digits of your Social Security Number (xxxx) and Date of Birth (mm/dd/yyyy) as indicated and click Submit. You will be taken to the next sign-up page. 5. Create a Swarm ID. This will be your Swarm login ID and cannot be changed, so think of one that is secure and easy to remember. 6. Create a Swarm password. You can change your password at any time. 7. Enter your Password Reset Question and Answer. This can be used at a later time if you forget your password. 8. Enter your e-mail address. You will receive e-mail notification when new information is available in [address] 9. Click Sign Up. You can now view and download portions of your medical record. 10. Click the Download Summary menu link to download a portable copy of your medical information. If you have questions, please visit the Frequently Asked Questions section of the Swarm website. Remember, Swarm is NOT to be used for urgent needs. For medical emergencies, dial 911. Now available from your iPhone and Android! Please provide this summary of care documentation to your next provider. Your primary care clinician is listed as Sarika Carmona. If you have any questions after today's visit, please call [phone].

## 2017-05-03 ENCOUNTER — DOCUMENTATION ONLY (OUTPATIENT)
Dept: CARDIOLOGY CLINIC | Age: 52
End: 2017-05-03

## 2017-05-03 LAB
BUN SERPL-MCNC: 12 MG/DL (ref 6–24)
BUN/CREAT SERPL: 14 (ref 9–20)
CALCIUM SERPL-MCNC: 10.2 MG/DL (ref 8.7–10.2)
CHLORIDE SERPL-SCNC: 100 MMOL/L (ref 96–106)
CO2 SERPL-SCNC: 24 MMOL/L (ref 18–29)
CREAT SERPL-MCNC: 0.84 MG/DL (ref 0.76–1.27)
ERYTHROCYTE [DISTWIDTH] IN BLOOD BY AUTOMATED COUNT: 15.6 % (ref 12.3–15.4)
GLUCOSE SERPL-MCNC: 92 MG/DL (ref 65–99)
HCT VFR BLD AUTO: 47.4 % (ref 37.5–51)
HGB BLD-MCNC: 15.9 G/DL (ref 12.6–17.7)
MCH RBC QN AUTO: 29.7 PG (ref 26.6–33)
MCHC RBC AUTO-ENTMCNC: 33.5 G/DL (ref 31.5–35.7)
MCV RBC AUTO: 88 FL (ref 79–97)
PLATELET # BLD AUTO: 378 X10E3/UL (ref 150–379)
POTASSIUM SERPL-SCNC: 5.1 MMOL/L (ref 3.5–5.2)
RBC # BLD AUTO: 5.36 X10E6/UL (ref 4.14–5.8)
SODIUM SERPL-SCNC: 139 MMOL/L (ref 134–144)
WBC # BLD AUTO: 11.2 X10E3/UL (ref 3.4–10.8)

## 2017-05-03 RX ORDER — ISOSORBIDE MONONITRATE 30 MG/1
30 TABLET, EXTENDED RELEASE ORAL DAILY
Qty: 30 TAB | Refills: 4 | Status: SHIPPED | OUTPATIENT
Start: 2017-05-03 | End: 2017-06-04

## 2017-05-03 RX ORDER — ISOSORBIDE MONONITRATE 30 MG/1
TABLET, EXTENDED RELEASE ORAL DAILY
COMMUNITY
End: 2017-05-03 | Stop reason: SDUPTHER

## 2017-05-03 NOTE — PROGRESS NOTES
Right/left heart cath with  on Weds May 10 at 8:45am Arrival time of 6:45am 2nd floor registration. Npo after midnight,hold lasix morning of procedure. Current labs in chart. Pt advised of instructions.

## 2017-05-09 DIAGNOSIS — I25.111 CORONARY ARTERY DISEASE INVOLVING NATIVE CORONARY ARTERY OF NATIVE HEART WITH ANGINA PECTORIS WITH DOCUMENTED SPASM (HCC): Primary | ICD-10-CM

## 2017-05-09 RX ORDER — SODIUM CHLORIDE 0.9 % (FLUSH) 0.9 %
5-10 SYRINGE (ML) INJECTION EVERY 8 HOURS
Status: CANCELLED | OUTPATIENT
Start: 2017-05-10

## 2017-05-09 RX ORDER — SODIUM CHLORIDE 0.9 % (FLUSH) 0.9 %
5-10 SYRINGE (ML) INJECTION AS NEEDED
Status: CANCELLED | OUTPATIENT
Start: 2017-05-10

## 2017-05-09 NOTE — PROGRESS NOTES
10:56 AM Pt's. Chart reviewed possibly including viewing of labs, test results, imaging results and history and physical for possible cath/angio/EP procedure.

## 2017-05-10 ENCOUNTER — HOSPITAL ENCOUNTER (OUTPATIENT)
Dept: CARDIAC CATH/INVASIVE PROCEDURES | Age: 52
Discharge: HOME | End: 2017-05-10
Attending: INTERNAL MEDICINE | Admitting: INTERNAL MEDICINE
Payer: MEDICARE

## 2017-05-10 VITALS
HEART RATE: 78 BPM | DIASTOLIC BLOOD PRESSURE: 92 MMHG | RESPIRATION RATE: 18 BRPM | HEIGHT: 67 IN | BODY MASS INDEX: 30.52 KG/M2 | WEIGHT: 194.45 LBS | SYSTOLIC BLOOD PRESSURE: 136 MMHG | TEMPERATURE: 97.7 F | OXYGEN SATURATION: 99 %

## 2017-05-10 DIAGNOSIS — I25.111 CORONARY ARTERY DISEASE INVOLVING NATIVE CORONARY ARTERY OF NATIVE HEART WITH ANGINA PECTORIS WITH DOCUMENTED SPASM (HCC): ICD-10-CM

## 2017-05-10 LAB
ANION GAP BLD CALC-SCNC: 9 MMOL/L (ref 5–15)
BUN SERPL-MCNC: 11 MG/DL (ref 6–20)
BUN/CREAT SERPL: 13 (ref 12–20)
CALCIUM SERPL-MCNC: 9 MG/DL (ref 8.5–10.1)
CHLORIDE SERPL-SCNC: 106 MMOL/L (ref 97–108)
CO2 SERPL-SCNC: 26 MMOL/L (ref 21–32)
COHGB MFR BLD: 3.3 % (ref 1–2)
COHGB MFR BLD: 3.3 % (ref 1–2)
COHGB MFR BLD: 3.6 % (ref 1–2)
CREAT SERPL-MCNC: 0.88 MG/DL (ref 0.7–1.3)
ERYTHROCYTE [DISTWIDTH] IN BLOOD BY AUTOMATED COUNT: 15.3 % (ref 11.5–14.5)
GLUCOSE SERPL-MCNC: 94 MG/DL (ref 65–100)
HCT VFR BLD AUTO: 43.2 % (ref 36.6–50.3)
HGB BLD OXIMETRY-MCNC: 13.7 G/DL (ref 14–17)
HGB BLD OXIMETRY-MCNC: 14.3 G/DL (ref 14–17)
HGB BLD OXIMETRY-MCNC: 14.8 G/DL (ref 14–17)
HGB BLD-MCNC: 14.8 G/DL (ref 12.1–17)
MCH RBC QN AUTO: 29.5 PG (ref 26–34)
MCHC RBC AUTO-ENTMCNC: 34.3 G/DL (ref 30–36.5)
MCV RBC AUTO: 86.2 FL (ref 80–99)
METHGB MFR BLD: 0.4 % (ref 0–1.4)
OXYHGB MFR BLD: 68.1 % (ref 94–97)
OXYHGB MFR BLD: 73.9 % (ref 94–97)
OXYHGB MFR BLD: 87.4 % (ref 94–97)
PLATELET # BLD AUTO: 358 K/UL (ref 150–400)
POTASSIUM SERPL-SCNC: 3.7 MMOL/L (ref 3.5–5.1)
RBC # BLD AUTO: 5.01 M/UL (ref 4.1–5.7)
SAO2 % BLD: 71 % (ref 95–99)
SAO2 % BLD: 77 % (ref 95–99)
SAO2 % BLD: 91 % (ref 95–99)
SODIUM SERPL-SCNC: 141 MMOL/L (ref 136–145)
WBC # BLD AUTO: 10.7 K/UL (ref 4.1–11.1)

## 2017-05-10 PROCEDURE — 82375 ASSAY CARBOXYHB QUANT: CPT | Performed by: INTERNAL MEDICINE

## 2017-05-10 PROCEDURE — 80048 BASIC METABOLIC PNL TOTAL CA: CPT | Performed by: INTERNAL MEDICINE

## 2017-05-10 PROCEDURE — 74011636320 HC RX REV CODE- 636/320: Performed by: INTERNAL MEDICINE

## 2017-05-10 PROCEDURE — 77030004533 HC CATH ANGI DX IMP BSC -B

## 2017-05-10 PROCEDURE — 77030004522 HC CATH ANGI DX EXPO BSC -A

## 2017-05-10 PROCEDURE — 74011250637 HC RX REV CODE- 250/637: Performed by: INTERNAL MEDICINE

## 2017-05-10 PROCEDURE — 93460 R&L HRT ART/VENTRICLE ANGIO: CPT

## 2017-05-10 PROCEDURE — C1769 GUIDE WIRE: HCPCS

## 2017-05-10 PROCEDURE — 36415 COLL VENOUS BLD VENIPUNCTURE: CPT | Performed by: INTERNAL MEDICINE

## 2017-05-10 PROCEDURE — 77030029065 HC DRSG HEMO QCLOT ZMED -B

## 2017-05-10 PROCEDURE — 77030018836 HC SOL IRR NACL ICUM -A

## 2017-05-10 PROCEDURE — 77030028837 HC SYR ANGI PWR INJ COEU -A

## 2017-05-10 PROCEDURE — 74011000250 HC RX REV CODE- 250: Performed by: INTERNAL MEDICINE

## 2017-05-10 PROCEDURE — 85027 COMPLETE CBC AUTOMATED: CPT | Performed by: INTERNAL MEDICINE

## 2017-05-10 PROCEDURE — 77030004532 HC CATH ANGI DX IMP BSC -A

## 2017-05-10 PROCEDURE — 74011250636 HC RX REV CODE- 250/636: Performed by: INTERNAL MEDICINE

## 2017-05-10 PROCEDURE — C1894 INTRO/SHEATH, NON-LASER: HCPCS

## 2017-05-10 RX ORDER — HEPARIN SODIUM 200 [USP'U]/100ML
500 INJECTION, SOLUTION INTRAVENOUS ONCE
Status: COMPLETED | OUTPATIENT
Start: 2017-05-10 | End: 2017-05-10

## 2017-05-10 RX ORDER — MIDAZOLAM HYDROCHLORIDE 1 MG/ML
.5-1 INJECTION, SOLUTION INTRAMUSCULAR; INTRAVENOUS
Status: DISCONTINUED | OUTPATIENT
Start: 2017-05-10 | End: 2017-05-10 | Stop reason: HOSPADM

## 2017-05-10 RX ORDER — SODIUM CHLORIDE 0.9 % (FLUSH) 0.9 %
5-10 SYRINGE (ML) INJECTION AS NEEDED
Status: DISCONTINUED | OUTPATIENT
Start: 2017-05-10 | End: 2017-05-10 | Stop reason: HOSPADM

## 2017-05-10 RX ORDER — LIDOCAINE HYDROCHLORIDE 10 MG/ML
20-30 INJECTION INFILTRATION; PERINEURAL
Status: DISCONTINUED | OUTPATIENT
Start: 2017-05-10 | End: 2017-05-10 | Stop reason: HOSPADM

## 2017-05-10 RX ORDER — SODIUM CHLORIDE 0.9 % (FLUSH) 0.9 %
5-10 SYRINGE (ML) INJECTION EVERY 8 HOURS
Status: DISCONTINUED | OUTPATIENT
Start: 2017-05-10 | End: 2017-05-10 | Stop reason: HOSPADM

## 2017-05-10 RX ORDER — FENTANYL CITRATE 50 UG/ML
25-200 INJECTION, SOLUTION INTRAMUSCULAR; INTRAVENOUS
Status: DISCONTINUED | OUTPATIENT
Start: 2017-05-10 | End: 2017-05-10 | Stop reason: HOSPADM

## 2017-05-10 RX ORDER — SODIUM CHLORIDE 9 MG/ML
75 INJECTION, SOLUTION INTRAVENOUS CONTINUOUS
Status: DISCONTINUED | OUTPATIENT
Start: 2017-05-10 | End: 2017-05-10 | Stop reason: HOSPADM

## 2017-05-10 RX ORDER — ASPIRIN 325 MG
325 TABLET ORAL DAILY
Status: DISCONTINUED | OUTPATIENT
Start: 2017-05-10 | End: 2017-05-10 | Stop reason: HOSPADM

## 2017-05-10 RX ADMIN — FENTANYL CITRATE 25 MCG: 50 INJECTION, SOLUTION INTRAMUSCULAR; INTRAVENOUS at 09:14

## 2017-05-10 RX ADMIN — HEPARIN SODIUM 1000 UNITS: 200 INJECTION, SOLUTION INTRAVENOUS at 08:55

## 2017-05-10 RX ADMIN — ASPIRIN 325 MG: 325 TABLET ORAL at 12:54

## 2017-05-10 RX ADMIN — IOPAMIDOL 170 ML: 755 INJECTION, SOLUTION INTRAVENOUS at 09:49

## 2017-05-10 RX ADMIN — MIDAZOLAM HYDROCHLORIDE 1 MG: 1 INJECTION, SOLUTION INTRAMUSCULAR; INTRAVENOUS at 09:34

## 2017-05-10 RX ADMIN — MIDAZOLAM HYDROCHLORIDE 1 MG: 1 INJECTION, SOLUTION INTRAMUSCULAR; INTRAVENOUS at 09:13

## 2017-05-10 RX ADMIN — LIDOCAINE HYDROCHLORIDE 20 ML: 10 INJECTION, SOLUTION INFILTRATION; PERINEURAL at 09:14

## 2017-05-10 RX ADMIN — MIDAZOLAM HYDROCHLORIDE 2 MG: 1 INJECTION, SOLUTION INTRAMUSCULAR; INTRAVENOUS at 09:12

## 2017-05-10 RX ADMIN — FENTANYL CITRATE 25 MCG: 50 INJECTION, SOLUTION INTRAMUSCULAR; INTRAVENOUS at 09:34

## 2017-05-10 RX ADMIN — MIDAZOLAM HYDROCHLORIDE 1 MG: 1 INJECTION, SOLUTION INTRAMUSCULAR; INTRAVENOUS at 09:17

## 2017-05-10 RX ADMIN — FENTANYL CITRATE 50 MCG: 50 INJECTION, SOLUTION INTRAMUSCULAR; INTRAVENOUS at 09:12

## 2017-05-10 NOTE — INTERVAL H&P NOTE
H&P Update:  Pawel De Oliveira was seen and examined. History and physical has been reviewed. The patient has been examined.  There have been no significant clinical changes since the completion of the originally dated History and Physical.    Signed By: Brenda Fajardo MD     May 10, 2017 9:01 AM

## 2017-05-10 NOTE — PROGRESS NOTES
7:26 AM  Patient arrived. ID and allergies verified verbally with patient. Pt voices understanding of procedure to be performed. Consent obtained. Pt prepped for procedure. Patient and family oriented to fall prevention protocol. Understanding verbalized. Yellow band and socks applied    8:45 AM  TRANSFER - OUT REPORT:    Verbal report given to Ed(name) on Russel Baez  being transferred to cath lab(unit) for ordered procedure       Report consisted of patients Situation, Background, Assessment and   Recommendations(SBAR). Information from the following report(s) SBAR was reviewed with the receiving nurse. Lines:   Peripheral IV 05/10/17 Left Antecubital (Active)        Opportunity for questions and clarification was provided. Patient transported with:   Registered Nurse    9:53 AM  TRANSFER - IN REPORT:    Verbal report received from Ed(name) on Russel Baez  being received from cath lab(unit) for routine post - op      Report consisted of patients Situation, Background, Assessment and   Recommendations(SBAR). Information from the following report(s) SBAR, Procedure Summary and MAR was reviewed with the receiving nurse. Opportunity for questions and clarification was provided. Assessment completed upon patients arrival to unit and care assumed. Pt voices understanding of post procedure bedrest instructions. 1007  A and V sheath pulled  Clotting pad and manual pressure used    1017  Hemostasis achieved   Sterile dry dressing applied    1115  Head of bed up  Groin site stable    1245  Discharge instructions reviewed with patient and family. Voiced understanding. Patient given copy of discharge instructions to take home. 1300  Pt sat on side of bed then ambulated around unit and to restroom. Voided. Returned to bed  R groin site dressing dry and intact. No bleeding or hematoma. Pt voices no complaints.     R groin site stable and pulses in R foot palpable    1:19 PM  Pt discharged off unit via wheelchair into care of friend

## 2017-05-10 NOTE — PROCEDURES
BRIEF PROCEDURE NOTE    Date of Procedure: 5/10/2017   Preoperative Diagnosis: Chest Pain - Angina CCS3  Postoperative Diagnosis: No sig CAD   Procedure: Left heart cath, LV angiography, coronary angiography  Interventional Cardiologist: Alvin Moore MD  Anesthesia: local + IV sedation  Estimated Blood Loss: Minimal  Findings:     L Main: Nml    LAD: Med size; MLI; D1/D2 - MLI    LCflex: Med to Large; MLI; OM1/OM2 - med/MLI    RCA: Difficult to engage - JR4/5/3DRC - subselective with JR5 - Med/ MLI    LVEDP: 15    LVEF: 55%    Haziness (slit like)  noted large R ext iliac - probably  dissection -non flow limiting         RHC findings:    RAPm= 5      mmHg  RVSP=  24   mmHg  PAPm=   11     mmHg  PCWPm=  8  mmHg      Specimens Removed : None    Closure Device: Manual    R Dorsalis Pedis - post cath - 2+/Foot warm        See full cath note.     Complications: none    Alvin Moore MD
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
## 2017-05-10 NOTE — IP AVS SNAPSHOT
Ekaterina Marx 
 
 
 [address] 
[phone] Patient: Pawel De Oliveira MRN: [id number] :1965 You are allergic to the following Allergen Reactions Codeine Anaphylaxis Venom-Honey Bee Anaphylaxis Methadone Rash Neurontin (Gabapentin) Vertigo Penicillins Rash Pt. States he takes Keflex with no difficulties or adverse reactions Trilafon Other (comments) Tardive dyskinesia Recent Documentation Height Weight BMI Smoking Status 1.702 m 88.2 kg 30.45 kg/m2 Current Every Day Smoker Emergency Contacts Name Discharge Info Relation Home Work Mobile Chandrika Diggs  Sister [23] [phone] Anthony Patel DISCHARGE CAREGIVER [3] Friend [5]   [phone] About your hospitalization You were admitted on:  May 10, 2017 You last received care in the:  [facility] PACU You were discharged on:  May 10, 2017 Unit phone number:  [phone] Why you were hospitalized Your primary diagnosis was:  Not on File Providers Seen During Your Hospitalizations Provider Role Specialty Primary office phone Brenda Fajardo MD Attending Provider Cardiology [phone] Your Primary Care Physician (PCP) Primary Care Physician Office Phone Office Fax [facility] [place], [address] [place] [address] [phone] Follow-up Information None Your Appointments Thursday May 25, 2017  3:20 PM EDT  
ESTABLISHED PATIENT with Brenda Fajardo MD  
[facility] ([address]) 354 [facility] 148 3465 [facility]  
[phone] Current Discharge Medication List  
  
ASK your doctor about these medications Dose & Instructions Dispensing Information Comments Morning Noon Evening Bedtime  
 cyclobenzaprine 10 mg tablet Commonly known as:  FLEXERIL  
 Your last dose was: Your next dose is:    
   
   
 Dose:  10 mg Take 1 Tab by mouth three (3) times daily as needed for Muscle Spasm(s). Quantity:  30 Tab Refills:  1 EPIPEN 2-ERINN 0.3 mg/0.3 mL injection Generic drug:  EPINEPHrine Your last dose was: Your next dose is: INJECT 0.3 ML INTRAMUSCULARLY ONCE AS NEEDED FOR UP TO ONE DOSE Quantity:  1 Syringe Refills:  2  
     
   
   
   
  
 furosemide 20 mg tablet Commonly known as:  LASIX Your last dose was: Your next dose is:    
   
   
 Dose:  20 mg Take 1 Tab by mouth daily. Quantity:  30 Tab Refills:  3  
     
   
   
   
  
 isosorbide mononitrate ER 30 mg tablet Commonly known as:  IMDUR Your last dose was: Your next dose is:    
   
   
 Dose:  30 mg Take 1 Tab by mouth daily. Quantity:  30 Tab Refills:  4  
     
   
   
   
  
 mupirocin 2 % ointment Commonly known as:  [facility] Your last dose was: Your next dose is:    
   
   
 APPLY TO AFFECTED AREA TWO (2) TIMES A DAY. Refills:  0  
     
   
   
   
  
 promethazine 25 mg tablet Commonly known as:  PHENERGAN Your last dose was: Your next dose is:    
   
   
 Dose:  25 mg Take 25 mg by mouth every six (6) hours as needed for Nausea. Refills:  0 PROTONIX 40 mg tablet Generic drug:  pantoprazole Your last dose was: Your next dose is:    
   
   
 Dose:  40 mg Take 40 mg by mouth two (2) times a day. Refills:  0 SUMAtriptan 100 mg tablet Commonly known as:  IMITREX Your last dose was: Your next dose is:    
   
   
 Dose:  100 mg Take 1 Tab by mouth once as needed for Migraine for up to 1 dose. Quantity:  12 Tab Refills:  6  
     
   
   
   
  
 topiramate 50 mg tablet Commonly known as:  TOPAMAX Your last dose was: Your next dose is:    
   
   
 Dose:  50 mg Take 50 mg by mouth two (2) times a day. Refills:  0 VENTOLIN HFA 90 mcg/actuation inhaler Generic drug:  albuterol Your last dose was: Your next dose is: TAKE 1 PUFF BY INHALATION EVERY FOUR (4) HOURS AS NEEDED FOR WHEEZING. Quantity:  1 Inhaler Refills:  2 Discharge Instructions Magdalena Hendricks MD 
 
[address] [facility] [place], [address] [facility] Office [phone] Patient ID: Aaron Jacobsen [id number] 
45 y.o. 
1965 Admit Date: 5/10/2017 Discharge Date: 5/10/2017 Admitting Physician: Magdalena Hendricks MD  
 
Discharge Physician: Magdalena Hendricks MD 
 
Admission Diagnoses:  
Chest Pain Discharge Diagnoses: Active Problems: * No active hospital problems. * Discharge Condition: Good Cardiology Procedures this Admission:  Diagnostic left heart catheterization Disposition: home Patient Instructions:  
 
 
 
Reference discharge instructions provided by nursing for diet and activity. Follow-up with Dr Garrison Chang - 5/25/17 or earlier as needed Come to ED for pain/change in color or temp R LE/swelling R groin Signed: 
Magdalena Hendricks MD 
5/10/2017 
10:11 AM 
 
CARDIAC CATHETERIZATION/PCI DISCHARGE INSTRUCTIONS It is normal to feel tired the first couple days. Take it easy and follow the physicians instructions. CHECK THE CATHETER INSERTION SITE DAILY: 
 
You may shower 24 hours after the procedure, remove the bandage during showering. Wash with soap and water and pat dry. Gentle cleaning of the site with soap and water is sufficient, cover with a dry clean dressing or bandage. Do not apply creams or powders to the area. Do not sit in a bathtub or pool of water for 7 days or until wound has completely healed. Temporary bruising and discomfort is normal and may last a few weeks. You may have a  formation of a small lump at the site which may last up to 6 weeks. CALL THE PHYSICIANS: 
 
If the site becomes red, swollen or feels warm to the touch If there is bleeding or drainage or if there is unusual pain at the groin or down the leg. If there is any bleeding, lie down, apply pressure or have someone apply pressure with a clean cloth until the bleeding stops. If the bleeding continues, call 911 to be transported to the hospital. 
DO  [facility] [place] 748. ACTIVITY: 
 
For the first 24-48 hours or as instructed by the physician: No lifting, pushing or pulling over 10 pounds and no straining the insertion site. Do not life grocery bags or the garbage can, do not run the vacuum  or  for 7 days. Start with short walks as in the hospital and gradually increase as tolerated each day. It is recommended to walk 30 minutes 5-7 days per week. Follow your physicians instructions on activity. Avoid walking outside in extremes of heat or cold. Walk inside when it is cold and windy or hot and humid. Things to keep in mind: 
No driving for at least 24 hours, or as designated by your physician. Limit the number of times you go up and down the stairs Take rests and pace yourself with activity. Be careful and do not strain with bowel movements. MEDICATIONS: 
 
Take all medications as prescribed Call your physician if you have any questions Keep an updated list of your medications with you at all times and give a list to your physician and pharmacist 
 
 
 
SIGNS AND SYMPTOMS: 
 
Be cautious of symptoms of angina or recurrent symptoms such as chest discomfort, unusual shortness of breath or fatigue. These could be symptoms of restenosis, a new blockage or a heart attack.   
If your symptoms are relieved with rest it is still recommended that you notify your physician of recurrent chest pain or discomfort. FOR CHEST PAIN or symptoms of angina not relieved with rest:  If the discomfort is not relieved with rest, and you have been prescribed Nitroglycerin, take as directed (taken under the tongue, one at a time 5 minutes apart for a total of 3 doses). If the discomfort is not relieved after the 3rd nitroglycerin, call 911. If you have not been prescribed Nitroglycerin  and your chest discomfort is not relieved with rest, call 911. AFTER CARE: 
 
Follow up with your physician as instructed. Follow a heart healthy diet with proper portion control, daily stress management, daily exercise, blood pressure and cholesterol control , and smoking cessation. Discharge Orders None [facility]! Dear Theo Said: 
Thank you for requesting a [employer] account. Our records indicate that you already have an active [employer] account. You can access your account anytime at [email or web address] Did you know that you can access your hospital and ER discharge instructions at any time in [employer]? You can also review all of your test results from your hospital stay or ER visit. Additional Information If you have questions, please visit the Frequently Asked Questions section of the [employer] website at [email or web address] Remember, [employer] is NOT to be used for urgent needs. For medical emergencies, dial 911. Now available from your iPhone and Android! General Information Please provide this summary of care documentation to your next provider. Patient Signature:  ____________________________________________________________ Date:  ____________________________________________________________  
  
Meka Hernadez Provider Signature:  ____________________________________________________________ Date:  ____________________________________________________________

## 2017-05-10 NOTE — DISCHARGE INSTRUCTIONS
Alvin Moore MD    [address] [facility], [address] [facility]    Office [phone],IFO [phone]      Patient ID:  Dinorah Zurita  [id number]  45 y.o.  1965    Admit Date: 5/10/2017    Discharge Date: 5/10/2017     Admitting Physician: Alvin Moore MD     Discharge Physician: Alvin Moore MD    Admission Diagnoses:   Chest Pain    Discharge Diagnoses: Active Problems:    * No active hospital problems. *      Discharge Condition: Good    Cardiology Procedures this Admission:  Diagnostic left heart catheterization    Disposition: home    Patient Instructions:         Reference discharge instructions provided by nursing for diet and activity. Follow-up with Dr Rosa Betancourt - 5/25/17 or earlier as needed  Come to ED for pain/change in color or temp R LE/swelling R groin     Signed:  Alvin Moore MD  5/10/2017  10:11 AM    CARDIAC CATHETERIZATION/PCI DISCHARGE INSTRUCTIONS    It is normal to feel tired the first couple days. Take it easy and follow the physicians instructions. CHECK THE CATHETER INSERTION SITE DAILY:    You may shower 24 hours after the procedure, remove the bandage during showering. Wash with soap and water and pat dry. Gentle cleaning of the site with soap and water is sufficient, cover with a dry clean dressing or bandage. Do not apply creams or powders to the area. Do not sit in a bathtub or pool of water for 7 days or until wound has completely healed. Temporary bruising and discomfort is normal and may last a few weeks. You may have a  formation of a small lump at the site which may last up to 6 weeks. CALL THE PHYSICIANS:    If the site becomes red, swollen or feels warm to the touch  If there is bleeding or drainage or if there is unusual pain at the groin or down the leg. If there is any bleeding, lie down, apply pressure or have someone apply pressure with a clean cloth until the bleeding stops.    If the bleeding continues, call 911 to be transported to the hospital.  DO NOT DRIVE YOURSELF, [address]. ACTIVITY:    For the first 24-48 hours or as instructed by the physician:  No lifting, pushing or pulling over 10 pounds and no straining the insertion site. Do not life grocery bags or the garbage can, do not run the vacuum  or  for 7 days. Start with short walks as in the hospital and gradually increase as tolerated each day. It is recommended to walk 30 minutes 5-7 days per week. Follow your physicians instructions on activity. Avoid walking outside in extremes of heat or cold. Walk inside when it is cold and windy or hot and humid. Things to keep in mind:  No driving for at least 24 hours, or as designated by your physician. Limit the number of times you go up and down the stairs  Take rests and pace yourself with activity. Be careful and do not strain with bowel movements. MEDICATIONS:    Take all medications as prescribed  Call your physician if you have any questions  Keep an updated list of your medications with you at all times and give a list to your physician and pharmacist        SIGNS AND SYMPTOMS:    Be cautious of symptoms of angina or recurrent symptoms such as chest discomfort, unusual shortness of breath or fatigue. These could be symptoms of restenosis, a new blockage or a heart attack. If your symptoms are relieved with rest it is still recommended that you notify your physician of recurrent chest pain or discomfort. FOR CHEST PAIN or symptoms of angina not relieved with rest:  If the discomfort is not relieved with rest, and you have been prescribed Nitroglycerin, take as directed (taken under the tongue, one at a time 5 minutes apart for a total of 3 doses). If the discomfort is not relieved after the 3rd nitroglycerin, call 911.   If you have not been prescribed Nitroglycerin  and your chest discomfort is not relieved with rest, call 911.     AFTER CARE:    Follow up with your physician as instructed. Follow a heart healthy diet with proper portion control, daily stress management, daily exercise, blood pressure and cholesterol control , and smoking cessation.

## 2017-05-22 RX ORDER — FUROSEMIDE 20 MG/1
20 TABLET ORAL DAILY
Qty: 90 TAB | Refills: 3 | Status: SHIPPED | OUTPATIENT
Start: 2017-05-22 | End: 2017-11-27 | Stop reason: SDUPTHER

## 2017-05-25 ENCOUNTER — OFFICE VISIT (OUTPATIENT)
Dept: CARDIOLOGY CLINIC | Age: 52
End: 2017-05-25

## 2017-05-25 VITALS
BODY MASS INDEX: 31.23 KG/M2 | WEIGHT: 199 LBS | HEART RATE: 86 BPM | OXYGEN SATURATION: 96 % | DIASTOLIC BLOOD PRESSURE: 80 MMHG | RESPIRATION RATE: 20 BRPM | SYSTOLIC BLOOD PRESSURE: 128 MMHG | HEIGHT: 67 IN

## 2017-05-25 DIAGNOSIS — R06.00 DYSPNEA, UNSPECIFIED TYPE: Primary | ICD-10-CM
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
## 2017-05-25 NOTE — MR AVS SNAPSHOT
Visit Information Date & Time Provider Department Dept. Phone Encounter #  
 5/25/2017  3:20 PM Alvin Moore MD [facility] Sandor Guerrerorajan [phone] [id number] Your Appointments 7/4/2017  1:40 PM  
Follow Up with Isha Kirby MD  
[facility] Appt Note: follow up headache  $0CP  charli  1/30/17; r/s; r/s cp$ [facility] 5/4/17 [place] [address] [facility] [address] [facility] [address] [id number] [phone]  
  
   
 [place] [address] [id number] [address] Upcoming Health Maintenance Date Due FOBT Q 1 YEAR AGE 50-75 2/27/2013 INFLUENZA AGE 9 TO ADULT 8/1/2017 DTaP/Tdap/Td series (2 - Td) 1/20/2027 Allergies as of 5/25/2017  Review Complete On: 5/25/2017 By: Noemy Alejandro LPN Severity Noted Reaction Type Reactions Codeine High 01/30/2017    Anaphylaxis Venom-honey Bee High 08/12/2015    Anaphylaxis Methadone  08/12/2015    Rash Neurontin [Gabapentin]  08/12/2015    Vertigo Penicillins  08/12/2015    Rash Pt. States he takes Keflex with no difficulties or adverse reactions Trilafon  08/12/2015    Other (comments) Tardive dyskinesia Current Immunizations  Never Reviewed Name Date Tdap 1/20/2017 Not reviewed this visit You Were Diagnosed With   
  
 Codes Comments Dyspnea, unspecified type    -  Primary ICD-10-CM: R06.00 
ICD-9-CM: 786.09 Vitals BP Pulse Resp Height(growth percentile) Weight(growth percentile) SpO2  
 128/80 (BP 1 Location: Left arm, BP Patient Position: Sitting) 86 20 5' 7\" (1.702 m) 199 lb (90.3 kg) 96% BMI Smoking Status 31.17 kg/m2 Current Every Day Smoker Vitals History BMI and BSA Data Body Mass Index Body Surface Area  
 31.17 kg/m 2 2.07 m 2 Preferred Pharmacy Pharmacy Name Phone [facility] #[id number] - [place] - [address] [phone] Your Updated Medication List  
  
   
This list is accurate as of: 5/25/17  3:59 PM.  Always use your most recent med list.  
  
  
  
  
 cyclobenzaprine 10 mg tablet Commonly known as:  FLEXERIL Take 1 Tab by mouth three (3) times daily as needed for Muscle Spasm(s). EPIPEN 2-ERINN 0.3 mg/0.3 mL injection Generic drug:  EPINEPHrine INJECT 0.3 ML INTRAMUSCULARLY ONCE AS NEEDED FOR UP TO ONE DOSE  
  
 furosemide 20 mg tablet Commonly known as:  LASIX Take 1 Tab by mouth daily. isosorbide mononitrate ER 30 mg tablet Commonly known as:  IMDUR Take 1 Tab by mouth daily. mupirocin 2 % ointment Commonly known as:  BACTROBAN  
APPLY TO AFFECTED AREA TWO (2) TIMES A DAY. promethazine 25 mg tablet Commonly known as:  PHENERGAN Take 25 mg by mouth every six (6) hours as needed for Nausea. PROTONIX 40 mg tablet Generic drug:  pantoprazole Take 40 mg by mouth two (2) times a day. SUMAtriptan 100 mg tablet Commonly known as:  IMITREX Take 1 Tab by mouth once as needed for Migraine for up to 1 dose. topiramate 50 mg tablet Commonly known as:  TOPAMAX Take 50 mg by mouth two (2) times a day. VENTOLIN HFA 90 mcg/actuation inhaler Generic drug:  albuterol TAKE 1 PUFF BY INHALATION EVERY FOUR (4) HOURS AS NEEDED FOR WHEEZING. We Performed the Following AMB POC EKG ROUTINE W/ 12 LEADS, INTER & REP [73269 CPT(R)] Introducing [facility]! Dear Michi : 
Thank you for requesting a [employer] account. Our records indicate that you already have an active [employer] account. You can access your account anytime at [email or web address] Did you know that you can access your hospital and ER discharge instructions at any time in [employer]? You can also review all of your test results from your hospital stay or ER visit. Additional Information If you have questions, please visit the Frequently Asked Questions section of the [employer] website at [email or web address] Remember, [employer] is NOT to be used for urgent needs. For medical emergencies, dial 911. Now available from your iPhone and Android! Please provide this summary of care documentation to your next provider. Your primary care clinician is listed as Sarika Carmona. If you have any questions after today's visit, please call [phone].

## 2017-05-26 ENCOUNTER — DOCUMENTATION ONLY (OUTPATIENT)
Dept: CARDIOLOGY CLINIC | Age: 52
End: 2017-05-26

## 2017-05-26 NOTE — PROGRESS NOTES
Chest ct per  -Tuesday May 30 [facility] arrive at 8 1st floor registration for 8:30 test. No solid food 4 hrs prior,may have clear liquids. No pain medication prior to testing. Pt advised of instructions

## 2017-05-30 ENCOUNTER — HOSPITAL ENCOUNTER (OUTPATIENT)
Dept: CT IMAGING | Age: 52
Discharge: HOME | End: 2017-05-30
Attending: INTERNAL MEDICINE
Payer: MEDICARE

## 2017-05-30 DIAGNOSIS — R06.00 DYSPNEA, UNSPECIFIED TYPE: ICD-10-CM

## 2017-05-30 PROCEDURE — 71260 CT THORAX DX C+: CPT

## 2017-05-30 PROCEDURE — 74011636320 HC RX REV CODE- 636/320: Performed by: RADIOLOGY

## 2017-05-30 RX ADMIN — IOPAMIDOL 93 ML: 612 INJECTION, SOLUTION INTRAVENOUS at 09:04

## 2017-06-02 ENCOUNTER — DOCUMENTATION ONLY (OUTPATIENT)
Dept: CARDIOLOGY CLINIC | Age: 52
End: 2017-06-02

## 2017-06-02 NOTE — PROGRESS NOTES
Telephone call to patient-CT results discussed with patient. Referral to pulmonary. He will call to make appointment. #4 pulmonary associates provided.

## 2017-06-04 ENCOUNTER — APPOINTMENT (OUTPATIENT)
Dept: CT IMAGING | Age: 52
End: 2017-06-04
Attending: EMERGENCY MEDICINE
Payer: MEDICARE

## 2017-06-04 ENCOUNTER — HOSPITAL ENCOUNTER (EMERGENCY)
Age: 52
Discharge: HOME | End: 2017-06-04
Attending: EMERGENCY MEDICINE
Payer: MEDICARE

## 2017-06-04 VITALS
HEIGHT: 67 IN | OXYGEN SATURATION: 94 % | BODY MASS INDEX: 31.39 KG/M2 | SYSTOLIC BLOOD PRESSURE: 127 MMHG | RESPIRATION RATE: 17 BRPM | WEIGHT: 200 LBS | TEMPERATURE: 98.1 F | DIASTOLIC BLOOD PRESSURE: 87 MMHG | HEART RATE: 90 BPM

## 2017-06-04 DIAGNOSIS — M54.12 CERVICAL RADICULOPATHY: ICD-10-CM

## 2017-06-04 DIAGNOSIS — R20.0 LEFT SIDED NUMBNESS: Primary | ICD-10-CM

## 2017-06-04 DIAGNOSIS — M54.16 LUMBAR RADICULOPATHY: ICD-10-CM

## 2017-06-04 LAB
ALBUMIN SERPL BCP-MCNC: 3.7 G/DL (ref 3.5–5)
ALBUMIN/GLOB SERPL: 0.9 {RATIO} (ref 1.1–2.2)
ALP SERPL-CCNC: 101 U/L (ref 45–117)
ALT SERPL-CCNC: 19 U/L (ref 12–78)
AMPHET UR QL SCN: NEGATIVE
ANION GAP BLD CALC-SCNC: 13 MMOL/L (ref 5–15)
APPEARANCE UR: CLEAR
AST SERPL W P-5'-P-CCNC: 35 U/L (ref 15–37)
BACTERIA URNS QL MICRO: NEGATIVE /HPF
BARBITURATES UR QL SCN: NEGATIVE
BASOPHILS # BLD AUTO: 0.4 K/UL (ref 0–0.1)
BASOPHILS # BLD: 2 % (ref 0–1)
BENZODIAZ UR QL: NEGATIVE
BILIRUB SERPL-MCNC: 0.7 MG/DL (ref 0.2–1)
BILIRUB UR QL: NEGATIVE
BUN SERPL-MCNC: 8 MG/DL (ref 6–20)
BUN/CREAT SERPL: 9 (ref 12–20)
CALCIUM SERPL-MCNC: 9.2 MG/DL (ref 8.5–10.1)
CANNABINOIDS UR QL SCN: NEGATIVE
CHLORIDE SERPL-SCNC: 107 MMOL/L (ref 97–108)
CO2 SERPL-SCNC: 23 MMOL/L (ref 21–32)
COCAINE UR QL SCN: NEGATIVE
COLOR UR: NORMAL
CREAT SERPL-MCNC: 0.93 MG/DL (ref 0.7–1.3)
DIFFERENTIAL METHOD BLD: ABNORMAL
DRUG SCRN COMMENT,DRGCM: NORMAL
EOSINOPHIL # BLD: 0.6 K/UL (ref 0–0.4)
EOSINOPHIL NFR BLD: 3 % (ref 0–7)
EPITH CASTS URNS QL MICRO: NORMAL /LPF
ERYTHROCYTE [DISTWIDTH] IN BLOOD BY AUTOMATED COUNT: 15.1 % (ref 11.5–14.5)
GLOBULIN SER CALC-MCNC: 4 G/DL (ref 2–4)
GLUCOSE SERPL-MCNC: 76 MG/DL (ref 65–100)
GLUCOSE UR STRIP.AUTO-MCNC: NEGATIVE MG/DL
HCT VFR BLD AUTO: 44.7 % (ref 36.6–50.3)
HGB BLD-MCNC: 16 G/DL (ref 12.1–17)
HGB UR QL STRIP: NEGATIVE
HYALINE CASTS URNS QL MICRO: NORMAL /LPF (ref 0–5)
KETONES UR QL STRIP.AUTO: NEGATIVE MG/DL
LEUKOCYTE ESTERASE UR QL STRIP.AUTO: NEGATIVE
LYMPHOCYTES # BLD AUTO: 23 % (ref 12–49)
LYMPHOCYTES # BLD: 4.2 K/UL (ref 0.8–3.5)
MCH RBC QN AUTO: 30.4 PG (ref 26–34)
MCHC RBC AUTO-ENTMCNC: 35.8 G/DL (ref 30–36.5)
MCV RBC AUTO: 84.8 FL (ref 80–99)
METHADONE UR QL: NEGATIVE
MONOCYTES # BLD: 1.3 K/UL (ref 0–1)
MONOCYTES NFR BLD AUTO: 7 % (ref 5–13)
NEUTS SEG # BLD: 11.9 K/UL (ref 1.8–8)
NEUTS SEG NFR BLD AUTO: 65 % (ref 32–75)
NITRITE UR QL STRIP.AUTO: NEGATIVE
OPIATES UR QL: NEGATIVE
PCP UR QL: NEGATIVE
PH UR STRIP: 5 [PH] (ref 5–8)
PLATELET # BLD AUTO: 357 K/UL (ref 150–400)
POTASSIUM SERPL-SCNC: 4.9 MMOL/L (ref 3.5–5.1)
PROT SERPL-MCNC: 7.7 G/DL (ref 6.4–8.2)
PROT UR STRIP-MCNC: NEGATIVE MG/DL
RBC # BLD AUTO: 5.27 M/UL (ref 4.1–5.7)
RBC #/AREA URNS HPF: NORMAL /HPF (ref 0–5)
RBC MORPH BLD: ABNORMAL
SODIUM SERPL-SCNC: 143 MMOL/L (ref 136–145)
SP GR UR REFRACTOMETRY: 1.01 (ref 1–1.03)
UROBILINOGEN UR QL STRIP.AUTO: 0.2 EU/DL (ref 0.2–1)
WBC # BLD AUTO: 18.4 K/UL (ref 4.1–11.1)
WBC URNS QL MICRO: NORMAL /HPF (ref 0–4)

## 2017-06-04 PROCEDURE — 70450 CT HEAD/BRAIN W/O DYE: CPT

## 2017-06-04 PROCEDURE — 96361 HYDRATE IV INFUSION ADD-ON: CPT

## 2017-06-04 PROCEDURE — 80307 DRUG TEST PRSMV CHEM ANLYZR: CPT | Performed by: EMERGENCY MEDICINE

## 2017-06-04 PROCEDURE — 85025 COMPLETE CBC W/AUTO DIFF WBC: CPT | Performed by: EMERGENCY MEDICINE

## 2017-06-04 PROCEDURE — 96374 THER/PROPH/DIAG INJ IV PUSH: CPT

## 2017-06-04 PROCEDURE — 72131 CT LUMBAR SPINE W/O DYE: CPT

## 2017-06-04 PROCEDURE — 36415 COLL VENOUS BLD VENIPUNCTURE: CPT | Performed by: EMERGENCY MEDICINE

## 2017-06-04 PROCEDURE — 99284 EMERGENCY DEPT VISIT MOD MDM: CPT

## 2017-06-04 PROCEDURE — 80053 COMPREHEN METABOLIC PANEL: CPT | Performed by: EMERGENCY MEDICINE

## 2017-06-04 PROCEDURE — 72125 CT NECK SPINE W/O DYE: CPT

## 2017-06-04 PROCEDURE — 74011250636 HC RX REV CODE- 250/636: Performed by: EMERGENCY MEDICINE

## 2017-06-04 PROCEDURE — 81001 URINALYSIS AUTO W/SCOPE: CPT | Performed by: EMERGENCY MEDICINE

## 2017-06-04 RX ORDER — SODIUM CHLORIDE 0.9 % (FLUSH) 0.9 %
5-10 SYRINGE (ML) INJECTION EVERY 8 HOURS
Status: DISCONTINUED | OUTPATIENT
Start: 2017-06-04 | End: 2017-06-05 | Stop reason: HOSPADM

## 2017-06-04 RX ORDER — PREDNISONE 10 MG/1
TABLET ORAL
Qty: 21 TAB | Refills: 0 | Status: SHIPPED | OUTPATIENT
Start: 2017-06-04 | End: 2017-07-19

## 2017-06-04 RX ORDER — DEXAMETHASONE SODIUM PHOSPHATE 10 MG/ML
10 INJECTION INTRAMUSCULAR; INTRAVENOUS
Status: COMPLETED | OUTPATIENT
Start: 2017-06-04 | End: 2017-06-04

## 2017-06-04 RX ORDER — SODIUM CHLORIDE 0.9 % (FLUSH) 0.9 %
5-10 SYRINGE (ML) INJECTION AS NEEDED
Status: DISCONTINUED | OUTPATIENT
Start: 2017-06-04 | End: 2017-06-05 | Stop reason: HOSPADM

## 2017-06-04 RX ADMIN — SODIUM CHLORIDE 1000 ML: 900 INJECTION, SOLUTION INTRAVENOUS at 22:31

## 2017-06-04 RX ADMIN — DEXAMETHASONE SODIUM PHOSPHATE 10 MG: 10 INJECTION, SOLUTION INTRAMUSCULAR; INTRAVENOUS at 22:30

## 2017-06-05 NOTE — ED TRIAGE NOTES
Pt c/o numbness and tingling to left arm and leg since 6 am this morning. Pt said he has had neck surgery 8 months ago and has difficulty laying flat but has been \"doing weekends in [facility] and has been denied orders to not lay flat\".  Pt smells of alcohol in triage and admits to drinking prior to arrival.

## 2017-06-05 NOTE — ED NOTES
I have reviewed discharge instructions with the patient. The patient verbalized understanding. Pt confirmed understanding of need for follow up with primary care provider. Pt is not in any current distress and shows no evidence of clinical instability. Pt left ambulatory  Pt family/friends not present. Pt left with all personal belongings. Pt states they are  driving. Pt states chief complaint has improved with treatment provided    PT is alert and oriented x 4, Pt is hemodynamically/respiratorily stable. Paperwork given by provider and reviewed with patient, opportunity for questions/clarification given.

## 2017-06-05 NOTE — DISCHARGE INSTRUCTIONS
We hope that we have addressed all of your medical concerns. The examination and treatment you received in the Emergency Department were for an emergent problem and were not intended as complete care. It is important that you follow up with your healthcare provider(s) for ongoing care. If your symptoms worsen or do not improve as expected, and you are unable to reach your usual health care provider(s), you should return to the Emergency Department. Today's healthcare is undergoing tremendous change, and patient satisfaction surveys are one of the many tools to assess the quality of medical care. You may receive a survey from the [employer] regarding your experience in the Emergency Department. I hope that your experience has been completely positive, particularly the medical care that I provided. As such, please participate in the survey; anything less than excellent does not meet my expectations or intentions. [facility] and [address] [place] [address] participate in nationally recognized quality of care measures. If your blood pressure is greater than 120/80, as reported below, we urge that you seek medical care to address the potential of high blood pressure, commonly known as hypertension. Hypertension can be hereditary or can be caused by certain medical conditions, pain, stress, or \"white coat syndrome. \"       Please make an appointment with your health care provider(s) for follow up of your Emergency Department visit. VITALS:   Patient Vitals for the past 8 hrs:   Temp Pulse Resp BP SpO2   06/04/17 2118 97.8 °F (36.6 °C) (!) 126 18 (!) 128/95 96 %          Thank you for allowing us to provide you with medical care today. We realize that you have many choices for your emergency care needs. Please choose us in the future for any continued health care needs. Jenny [address] [facility]   Office: [phone]            Recent Results (from the past 24 hour(s))   CBC WITH AUTOMATED DIFF    Collection Time: 06/04/17  9:56 PM   Result Value Ref Range    WBC 18.4 (H) 4.1 - 11.1 K/uL    RBC 5.27 4. 10 - 5.70 M/uL    HGB 16.0 12.1 - 17.0 g/dL    HCT 44.7 36.6 - 50.3 %    MCV 84.8 80.0 - 99.0 FL    MCH 30.4 26.0 - 34.0 PG    MCHC 35.8 30.0 - 36.5 g/dL    RDW 15.1 (H) 11.5 - 14.5 %    PLATELET 175 015 - 482 K/uL    NEUTROPHILS 65 32 - 75 %    LYMPHOCYTES 23 12 - 49 %    MONOCYTES 7 5 - 13 %    EOSINOPHILS 3 0 - 7 %    BASOPHILS 2 (H) 0 - 1 %    ABS. NEUTROPHILS 11.9 (H) 1.8 - 8.0 K/UL    ABS. LYMPHOCYTES 4.2 (H) 0.8 - 3.5 K/UL    ABS. MONOCYTES 1.3 (H) 0.0 - 1.0 K/UL    ABS. EOSINOPHILS 0.6 (H) 0.0 - 0.4 K/UL    ABS. BASOPHILS 0.4 (H) 0.0 - 0.1 K/UL    DF MANUAL      RBC COMMENTS ANISOCYTOSIS  1+       METABOLIC PANEL, COMPREHENSIVE    Collection Time: 06/04/17  9:56 PM   Result Value Ref Range    Sodium 143 136 - 145 mmol/L    Potassium 4.9 3.5 - 5.1 mmol/L    Chloride 107 97 - 108 mmol/L    CO2 23 21 - 32 mmol/L    Anion gap 13 5 - 15 mmol/L    Glucose 76 65 - 100 mg/dL    BUN 8 6 - 20 MG/DL    Creatinine 0.93 0.70 - 1.30 MG/DL    BUN/Creatinine ratio 9 (L) 12 - 20      GFR est AA >60 >60 ml/min/1.73m2    GFR est non-AA >60 >60 ml/min/1.73m2    Calcium 9.2 8.5 - 10.1 MG/DL    Bilirubin, total 0.7 0.2 - 1.0 MG/DL    ALT (SGPT) 19 12 - 78 U/L    AST (SGOT) 35 15 - 37 U/L    Alk.  phosphatase 101 45 - 117 U/L    Protein, total 7.7 6.4 - 8.2 g/dL    Albumin 3.7 3.5 - 5.0 g/dL    Globulin 4.0 2.0 - 4.0 g/dL    A-G Ratio 0.9 (L) 1.1 - 2.2     URINALYSIS W/MICROSCOPIC    Collection Time: 06/04/17  9:56 PM   Result Value Ref Range    Color YELLOW/STRAW      Appearance CLEAR CLEAR      Specific gravity 1.010 1.003 - 1.030      pH (UA) 5.0 5.0 - 8.0      Protein NEGATIVE  NEG mg/dL    Glucose NEGATIVE  NEG mg/dL    Ketone NEGATIVE  NEG mg/dL    Bilirubin NEGATIVE  NEG      Blood NEGATIVE  NEG      Urobilinogen 0.2 0.2 - 1.0 EU/dL    Nitrites NEGATIVE  NEG      Leukocyte Esterase NEGATIVE  NEG      WBC 0-4 0 - 4 /hpf    RBC 0-5 0 - 5 /hpf    Epithelial cells FEW FEW /lpf    Bacteria NEGATIVE  NEG /hpf    Hyaline cast 0-2 0 - 5 /lpf   DRUG SCREEN, URINE    Collection Time: 06/04/17  9:56 PM   Result Value Ref Range    AMPHETAMINE NEGATIVE  NEG      BARBITURATES NEGATIVE  NEG      BENZODIAZEPINE NEGATIVE  NEG      COCAINE NEGATIVE  NEG      METHADONE NEGATIVE  NEG      OPIATES NEGATIVE  NEG      PCP(PHENCYCLIDINE) NEGATIVE  NEG      THC (TH-CANNABINOL) NEGATIVE  NEG      Drug screen comment (NOTE)        Ct Head Wo Cont    Result Date: 6/4/2017  EXAM:  CT HEAD WO CONT INDICATION:   left sided numbness COMPARISON: Comparison March 14, 2017. TECHNIQUE: Unenhanced CT of the head was performed using 5 mm images. Brain and bone windows were generated. CT dose reduction was achieved through use of a standardized protocol tailored for this examination and automatic exposure control for dose modulation. FINDINGS: The ventricles and sulci are normal in size, shape and configuration and midline. There is no significant white matter disease. There is no intracranial hemorrhage, extra-axial collection, mass, mass effect or midline shift. The basilar cisterns are open. No acute infarct is identified. The bone windows demonstrate no abnormalities. The visualized portions of the paranasal sinuses and mastoid air cells are clear. IMPRESSION: No change, no acute intracranial abnormality    Ct Spine Cerv Wo Cont    Result Date: 6/4/2017  INDICATION:  left sided numbness STUDY:  CT SPINE CERV WO CONT Examination: Cervical spine CT. No contrast or comparisons. Thin section axial images were obtained with sagittal and coronal reformats. CT dose reduction was achieved through use of a standardized protocol tailored for this examination and automatic exposure control for dose modulation.  FINDINGS: Alignment of the cervical spine is normal. There is no bony destructive lesion or evidence of acute fracture. There are vascular calcifications. Patient is post C5-C7 anterior decompression and fusion. There is incomplete corporation of the interbody bone plugs but no loosening of the hardware there are degenerative changes at C3-4 and C4-5 with bony foraminal narrowing left greater than right but no significant canal stenosis. IMPRESSION: 1. Postoperative changes C5-C7 2. Degenerative changes C3-4 and C4-5 with bony foraminal narrowing left greater than right 3. No acute fracture      Ct Spine Lumb Wo Cont    Result Date: 6/4/2017  INDICATION:  left leg numbness EXAM: CT lumbar spine. No comparisons. Thin section axial images were obtained. From these sagittal and coronal reformats were performed. CT dose reduction was achieved through use of a standardized protocol tailored for this examination and automatic exposure control for dose modulation. FINDINGS: There is 3 mm anterolisthesis of L4 on L5 with slight disc height loss. No fracture or bone destruction there are mild vascular calcifications L1-L2: No stenosis L2-L3: No stenosis L3-L4: Mild facet degenerative change without disc bulge or stenosis L4-L5: There are facet degenerative changes with a pseudodisc bulge. No significant canal stenosis. Mild narrowing of the foramen L5-S1: There are facet degenerative changes borderline narrowing the left and resulting in mild to moderate right foraminal narrowing     IMPRESSION: 1. Mild narrowing of the bilateral foramen L4-5 2. Mild to moderate right and borderline left foraminal narrowing L5-S1              Numbness and Tingling: Care Instructions  Your Care Instructions  Many things can cause numbness or tingling. Swelling may put pressure on a nerve. This could cause you to lose feeling or have a pins-and-needles sensation on part of your body. Nerves may be damaged from trauma, toxins, or diseases, such as diabetes or multiple sclerosis (MS). Sometimes, though, the cause is not clear. If there is no clear reason for your symptoms, and you are not having any other symptoms, your doctor may suggest watching and waiting for a while to see if the numbness or tingling goes away on its own. Your doctor may want you to have blood or nerve tests to find the cause of your symptoms. Follow-up care is a key part of your treatment and safety. Be sure to make and go to all appointments, and call your doctor if you are having problems. It's also a good idea to know your test results and keep a list of the medicines you take. How can you care for yourself at home? · If your doctor prescribes medicine, take it exactly as directed. Call your doctor if you think you are having a problem with your medicine. · If you have any swelling, put ice or a cold pack on the area for 10 to 20 minutes at a time. Put a thin cloth between the ice and your skin. When should you call for help? Call 911 anytime you think you may need emergency care. For example, call if:  · You have weakness, numbness, or tingling in both legs. · You lose bowel or bladder control. · You have symptoms of a stroke. These may include:  ¨ Sudden numbness, tingling, weakness, or loss of movement in your face, arm, or leg, especially on only one side of your body. ¨ Sudden vision changes. ¨ Sudden trouble speaking. ¨ Sudden confusion or trouble understanding simple statements. ¨ Sudden problems with walking or balance. ¨ A sudden, severe headache that is different from past headaches. Watch closely for changes in your health, and be sure to contact your doctor if you have any problems, or if:  · You do not get better as expected. Where can you learn more? Go to http://oscar-keely.info/. Enter M024 in the search box to learn more about \"Numbness and Tingling: Care Instructions. \"  Current as of: October 14, 2016  Content Version: 11.2  © 9857-8801 [employer]. Care instructions adapted under license by [employer] (which disclaims liability or warranty for this information). If you have questions about a medical condition or this instruction, always ask your healthcare professional. [address] any warranty or liability for your use of this information. Pinched Nerve in the Neck: Care Instructions  Your Care Instructions  A pinched nerve in the neck happens when a vertebra or disc in the upper part of your spine is damaged. This damage can happen because of an injury. Or it can just happen with age. The changes caused by the damage may put pressure on a nearby nerve root, pinching it. This causes symptoms such as sharp pain in your neck, shoulder, arm, or back. You may also have tingling or numbness. Sometimes it makes your arm weaker. The symptoms are usually worse when you turn your head or strain your neck. For many people, the symptoms get better over time and finally go away. Early treatment usually includes medicines for pain and swelling. Sometimes physical therapy and special exercises may help. Follow-up care is a key part of your treatment and safety. Be sure to make and go to all appointments, and call your doctor if you are having problems. It's also a good idea to know your test results and keep a list of the medicines you take. How can you care for yourself at home? · Be safe with medicines. Read and follow all instructions on the label. ¨ If the doctor gave you a prescription medicine for pain, take it as prescribed. ¨ If you are not taking a prescription pain medicine, ask your doctor if you can take an over-the-counter medicine. · Try using a heating pad on a low or medium setting for 15 to 20 minutes every 2 or 3 hours. Try a warm shower in place of one session with the heating pad. You can also buy single-use heat wraps that last up to 8 hours.   · You can also try an ice pack for 10 to 15 minutes every 2 to 3 hours. There isn't strong evidence that either heat or ice will help. But you can try them to see if they help you. · Don't spend too long in one position. Take short breaks to move around and change positions. · Wear a seat belt and shoulder harness when you are in a car. · Sleep with a pillow under your head and neck that keeps your neck straight. · If you were given a neck brace (cervical collar) to limit neck motion, wear it as instructed for as many days as your doctor tells you to. Do not wear it longer than you were told to. Wearing a brace for too long can lead to neck stiffness and can weaken the neck muscles. · Follow your doctor's instructions for gentle neck-stretching exercises. · Do not smoke. Smoking can slow healing of your discs. If you need help quitting, talk to your doctor about stop-smoking programs and medicines. These can increase your chances of quitting for good. · Avoid strenuous work or exercise until your doctor says it is okay. When should you call for help? Call 911 anytime you think you may need emergency care. For example, call if:  · You are unable to move an arm or a leg at all. Call your doctor now or seek immediate medical care if:  · You have new or worse symptoms in your arms, legs, chest, belly, or buttocks. Symptoms may include:  ¨ Numbness or tingling. ¨ Weakness. ¨ Pain. · You lose bladder or bowel control. Watch closely for changes in your health, and be sure to contact your doctor if:  · You are not getting better as expected. Where can you learn more? Go to http://oscar-keely.info/. Enter L772 in the search box to learn more about \"Pinched Nerve in the Neck: Care Instructions. \"  Current as of: May 23, 2016  Content Version: 11.2  © 8391-9372 [employer]. Care instructions adapted under license by [employer] (which disclaims liability or warranty for this information).  If you have questions about a medical condition or this instruction, always ask your healthcare professional. [address] any warranty or liability for your use of this information. Low Back Pain: Exercises  Your Care Instructions  Here are some examples of typical rehabilitation exercises for your condition. Start each exercise slowly. Ease off the exercise if you start to have pain. Your doctor or physical therapist will tell you when you can start these exercises and which ones will work best for you. How to do the exercises  Press-up    1. Lie on your stomach, supporting your body with your forearms. 2. Press your elbows down into the floor to raise your upper back. As you do this, relax your stomach muscles and allow your back to arch without using your back muscles. As your press up, do not let your hips or pelvis come off the floor. 3. Hold for 15 to 30 seconds, then relax. 4. Repeat 2 to 4 times. Alternate arm and leg (bird dog) exercise    Note: Do this exercise slowly. Try to keep your body straight at all times, and do not let one hip drop lower than the other. 1. Start on the floor, on your hands and knees. 2. Tighten your belly muscles. 3. Raise one leg off the floor, and hold it straight out behind you. Be careful not to let your hip drop down, because that will twist your trunk. 4. Hold for about 6 seconds, then lower your leg and switch to the other leg. 5. Repeat 8 to 12 times on each leg. 6. Over time, work up to holding for 10 to 30 seconds each time. 7. If you feel stable and secure with your leg raised, try raising the opposite arm straight out in front of you at the same time. Knee-to-chest exercise    1. Lie on your back with your knees bent and your feet flat on the floor. 2. Bring one knee to your chest, keeping the other foot flat on the floor (or keeping the other leg straight, whichever feels better on your lower back). 3. Keep your lower back pressed to the floor.  Hold for at least 15 to 30 seconds. 4. Relax, and lower the knee to the starting position. 5. Repeat with the other leg. Repeat 2 to 4 times with each leg. 6. To get more stretch, put your other leg flat on the floor while pulling your knee to your chest.  Curl-ups    1. Lie on the floor on your back with your knees bent at a 90-degree angle. Your feet should be flat on the floor, about 12 inches from your buttocks. 2. Cross your arms over your chest. If this bothers your neck, try putting your hands behind your neck (not your head), with your elbows spread apart. 3. Slowly tighten your belly muscles and raise your shoulder blades off the floor. 4. Keep your head in line with your body, and do not press your chin to your chest.  5. Hold this position for 1 or 2 seconds, then slowly lower yourself back down to the floor. 6. Repeat 8 to 12 times. Pelvic tilt exercise    1. Lie on your back with your knees bent. 2. \"Brace\" your stomach. This means to tighten your muscles by pulling in and imagining your belly button moving toward your spine. You should feel like your back is pressing to the floor and your hips and pelvis are rocking back. 3. Hold for about 6 seconds while you breathe smoothly. 4. Repeat 8 to 12 times. Heel dig bridging    1. Lie on your back with both knees bent and your ankles bent so that only your heels are digging into the floor. Your knees should be bent about 90 degrees. 2. Then push your heels into the floor, squeeze your buttocks, and lift your hips off the floor until your shoulders, hips, and knees are all in a straight line. 3. Hold for about 6 seconds as you continue to breathe normally, and then slowly lower your hips back down to the floor and rest for up to 10 seconds. 4. Do 8 to 12 repetitions. Hamstring stretch in doorway    1. Lie on your back in a doorway, with one leg through the open door. 2. Slide your leg up the wall to straighten your knee.  You should feel a gentle stretch down the back of your leg. 3. Hold the stretch for at least 15 to 30 seconds. Do not arch your back, point your toes, or bend either knee. Keep one heel touching the floor and the other heel touching the wall. 4. Repeat with your other leg. 5. Do 2 to 4 times for each leg. Hip flexor stretch    1. Kneel on the floor with one knee bent and one leg behind you. Place your forward knee over your foot. Keep your other knee touching the floor. 2. Slowly push your hips forward until you feel a stretch in the upper thigh of your rear leg. 3. Hold the stretch for at least 15 to 30 seconds. Repeat with your other leg. 4. Do 2 to 4 times on each side. Wall sit    1. Stand with your back 10 to 12 inches away from a wall. 2. Lean into the wall until your back is flat against it. 3. Slowly slide down until your knees are slightly bent, pressing your lower back into the wall. 4. Hold for about 6 seconds, then slide back up the wall. 5. Repeat 8 to 12 times. Follow-up care is a key part of your treatment and safety. Be sure to make and go to all appointments, and call your doctor if you are having problems. It's also a good idea to know your test results and keep a list of the medicines you take. Where can you learn more? Go to http://oscar-keely.info/. Enter T122 in the search box to learn more about \"Low Back Pain: Exercises. \"  Current as of: May 23, 2016  Content Version: 11.2  © 5254-8042 [employer]. Care instructions adapted under license by [employer] (which disclaims liability or warranty for this information). If you have questions about a medical condition or this instruction, always ask your healthcare professional. [place] [address] any warranty or liability for your use of this information. Back Pain: Care Instructions  Your Care Instructions    Back pain has many possible causes.  It is often related to problems with muscles and ligaments of the back. It may also be related to problems with the nerves, discs, or bones of the back. Moving, lifting, standing, sitting, or sleeping in an awkward way can strain the back. Sometimes you don't notice the injury until later. Arthritis is another common cause of back pain. Although it may hurt a lot, back pain usually improves on its own within several weeks. Most people recover in 12 weeks or less. Using good home treatment and being careful not to stress your back can help you feel better sooner. Follow-up care is a key part of your treatment and safety. Be sure to make and go to all appointments, and call your doctor if you are having problems. Its also a good idea to know your test results and keep a list of the medicines you take. How can you care for yourself at home? · Sit or lie in positions that are most comfortable and reduce your pain. Try one of these positions when you lie down:  ¨ Lie on your back with your knees bent and supported by large pillows. ¨ Lie on the floor with your legs on the seat of a sofa or chair. Verdie Renny on your side with your knees and hips bent and a pillow between your legs. ¨ Lie on your stomach if it does not make pain worse. · Do not sit up in bed, and avoid soft couches and twisted positions. Bed rest can help relieve pain at first, but it delays healing. Avoid bed rest after the first day of back pain. · Change positions every 30 minutes. If you must sit for long periods of time, take breaks from sitting. Get up and walk around, or lie in a comfortable position. · Try using a heating pad on a low or medium setting for 15 to 20 minutes every 2 or 3 hours. Try a warm shower in place of one session with the heating pad. · You can also try an ice pack for 10 to 15 minutes every 2 to 3 hours. Put a thin cloth between the ice pack and your skin. · Take pain medicines exactly as directed.   ¨ If the doctor gave you a prescription medicine for pain, take it as prescribed. ¨ If you are not taking a prescription pain medicine, ask your doctor if you can take an over-the-counter medicine. · Take short walks several times a day. You can start with 5 to 10 minutes, 3 or 4 times a day, and work up to longer walks. Walk on level surfaces and avoid hills and stairs until your back is better. · Return to work and other activities as soon as you can. Continued rest without activity is usually not good for your back. · To prevent future back pain, do exercises to stretch and strengthen your back and stomach. Learn how to use good posture, safe lifting techniques, and proper body mechanics. When should you call for help? Call your doctor now or seek immediate medical care if:  · You have new or worsening numbness in your legs. · You have new or worsening weakness in your legs. (This could make it hard to stand up.)  · You lose control of your bladder or bowels. Watch closely for changes in your health, and be sure to contact your doctor if:  · Your pain gets worse. · You are not getting better after 2 weeks. Where can you learn more? Go to http://oscar-keely.info/. Enter X083 in the search box to learn more about \"Back Pain: Care Instructions. \"  Current as of: May 23, 2016  Content Version: 11.2  © 9681-8173 [employer]. Care instructions adapted under license by [employer] (which disclaims liability or warranty for this information). If you have questions about a medical condition or this instruction, always ask your healthcare professional. [place] [address] any warranty or liability for your use of this information.
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
## 2017-06-15 ENCOUNTER — OFFICE VISIT (OUTPATIENT)
Dept: FAMILY MEDICINE CLINIC | Age: 52
End: 2017-06-15

## 2017-06-15 VITALS
BODY MASS INDEX: 30.92 KG/M2 | OXYGEN SATURATION: 94 % | HEART RATE: 74 BPM | RESPIRATION RATE: 16 BRPM | HEIGHT: 67 IN | TEMPERATURE: 97.8 F | DIASTOLIC BLOOD PRESSURE: 80 MMHG | WEIGHT: 197 LBS | SYSTOLIC BLOOD PRESSURE: 122 MMHG

## 2017-06-15 DIAGNOSIS — Z12.11 SCREENING FOR COLON CANCER: ICD-10-CM

## 2017-06-15 DIAGNOSIS — Z00.00 MEDICARE ANNUAL WELLNESS VISIT, SUBSEQUENT: Primary | ICD-10-CM

## 2017-06-15 NOTE — MR AVS SNAPSHOT
Visit Information Date & Time Provider Department Dept. Phone Encounter #  
 6/15/2017 10:00 AM Ema [facility] 108 [phone] [id number] Follow-up Instructions Return in about 1 year (around 6/15/2018), or if symptoms worsen or fail to improve. Your Appointments 7/4/2017  1:40 PM  
Follow Up with Domingo Green MD  
[address]) Appt Note: follow up headache  $0CP  charli  1/30/17; r/s; r/s cp$ [address] 5/4/17 [place] [address] [facility] [address] [facility] 99 [id number] [phone]  
  
   
 [place] [address] [facility] [address] [id number] [address] [place] 5/31/2018  1:20 PM  
ESTABLISHED PATIENT with Magdalena Hendricks MD  
[facility] ([facility]) Appt Note: annual  
 [address] [facility] [address] [place] [id number] [address] Upcoming Health Maintenance Date Due FOBT Q 1 YEAR AGE 50-75 7/20/2017* INFLUENZA AGE 9 TO ADULT 8/1/2017 DTaP/Tdap/Td series (2 - Td) 1/20/2027 *Topic was postponed. The date shown is not the original due date. Allergies as of 6/15/2017  Review Complete On: 6/15/2017 By: Ema Koenig NP Severity Noted Reaction Type Reactions Codeine High 01/30/2017    Anaphylaxis Venom-honey Bee High 08/12/2015    Anaphylaxis Methadone  08/12/2015    Rash Neurontin [Gabapentin]  08/12/2015    Vertigo Penicillins  08/12/2015    Rash Pt. States he takes Keflex with no difficulties or adverse reactions Trilafon  08/12/2015    Other (comments) Tardive dyskinesia Current Immunizations  Never Reviewed Name Date Tdap 1/20/2017 Not reviewed this visit You Were Diagnosed With   
  
 Codes Comments Medicare annual wellness visit, subsequent    -  Primary ICD-10-CM: Z00.00 ICD-9-CM: V70.0 Screening for colon cancer     ICD-10-CM: Z12.11 ICD-9-CM: V76.51 Vitals BP Pulse Temp Resp Height(growth percentile) Weight(growth percentile) 122/80 74 97.8 °F (36.6 °C) (Oral) 16 5' 7\" (1.702 m) 197 lb (89.4 kg) SpO2 BMI Smoking Status 94% 30.85 kg/m2 Current Every Day Smoker BMI and BSA Data Body Mass Index Body Surface Area  
 30.85 kg/m 2 2.06 m 2 Preferred Pharmacy Pharmacy Name Phone [id number] - [facility] - [phone] [facility] [address] [phone] Your Updated Medication List  
  
   
This list is accurate as of: 6/15/17 10:27 AM.  Always use your most recent med list.  
  
  
  
  
 cyclobenzaprine 10 mg tablet Commonly known as:  FLEXERIL Take 1 Tab by mouth three (3) times daily as needed for Muscle Spasm(s). EPIPEN 2-ERINN 0.3 mg/0.3 mL injection Generic drug:  EPINEPHrine INJECT 0.3 ML INTRAMUSCULARLY ONCE AS NEEDED FOR UP TO ONE DOSE  
  
 furosemide 20 mg tablet Commonly known as:  LASIX Take 1 Tab by mouth daily. mupirocin 2 % ointment Commonly known as:  BACTROBAN  
APPLY TO AFFECTED AREA TWO (2) TIMES A DAY. predniSONE 10 mg dose pack Commonly known as:  STERAPRED DS Take as directed. Start 6/5/2017. promethazine 25 mg tablet Commonly known as:  PHENERGAN Take 25 mg by mouth every six (6) hours as needed for Nausea. PROTONIX 40 mg tablet Generic drug:  pantoprazole Take 40 mg by mouth two (2) times a day. SUMAtriptan 100 mg tablet Commonly known as:  IMITREX Take 1 Tab by mouth once as needed for Migraine for up to 1 dose. topiramate 50 mg tablet Commonly known as:  TOPAMAX Take 50 mg by mouth two (2) times a day. VENTOLIN HFA 90 mcg/actuation inhaler Generic drug:  albuterol TAKE 1 PUFF BY INHALATION EVERY FOUR (4) HOURS AS NEEDED FOR WHEEZING. We Performed the Following OCCULT BLOOD, STOOL [id number] CPT(R)] Follow-up Instructions Return in about 1 year (around 6/15/2018), or if symptoms worsen or fail to improve. Patient Instructions Well Visit, Men 48 to 72: Care Instructions Your Care Instructions Physical exams can help you stay healthy. Your doctor has checked your overall health and may have suggested ways to take good care of yourself. He or she also may have recommended tests. At home, you can help prevent illness with healthy eating, regular exercise, and other steps. Follow-up care is a key part of your treatment and safety. Be sure to make and go to all appointments, and call your doctor if you are having problems. It's also a good idea to know your test results and keep a list of the medicines you take. How can you care for yourself at home? · Reach and stay at a healthy weight. This will lower your risk for many problems, such as obesity, diabetes, heart disease, and high blood pressure. · Get at least 30 minutes of exercise on most days of the week. Walking is a good choice. You also may want to do other activities, such as running, swimming, cycling, or playing tennis or team sports. · Do not smoke. Smoking can make health problems worse. If you need help quitting, talk to your doctor about stop-smoking programs and medicines. These can increase your chances of quitting for good. · Protect your skin from too much sun. When you're outdoors from 10 a.m. to 4 p.m., stay in the shade or cover up with clothing and a hat with a wide brim. Wear sunglasses that block UV rays. Even when it's cloudy, put broad-spectrum sunscreen (SPF 30 or higher) on any exposed skin. · See a dentist one or two times a year for checkups and to have your teeth cleaned. · Wear a seat belt in the car. · Limit alcohol to 2 drinks a day. Too much alcohol can cause health problems. Follow your doctor's advice about when to have certain tests. These tests can spot problems early. · Cholesterol. Your doctor will tell you how often to have this done based on your overall health and other things that can increase your risk for heart attack and stroke. · Blood pressure. Have your blood pressure checked during a routine doctor visit. Your doctor will tell you how often to check your blood pressure based on your age, your blood pressure results, and other factors. · Prostate exam. Talk to your doctor about whether you should have a blood test (called a PSA test) for prostate cancer. Experts disagree on whether men should have this test. Some experts recommend that you discuss the benefits and risks of the test with your doctor. · Diabetes. Ask your doctor whether you should have tests for diabetes. · Vision. Some experts recommend that you have yearly exams for glaucoma and other age-related eye problems starting at age 48. · Hearing. Tell your doctor if you notice any change in your hearing. You can have tests to find out how well you hear. · Colon cancer. You should begin tests for colon cancer at age 48. You may have one of several tests. Your doctor will tell you how often to have tests based on your age and risk. Risks include whether you already had a precancerous polyp removed from your colon or whether your parent, brother, sister, or child has had colon cancer. · Heart attack and stroke risk. At least every 4 to 6 years, you should have your risk for heart attack and stroke assessed. Your doctor uses factors such as your age, blood pressure, cholesterol, and whether you smoke or have diabetes to show what your risk for a heart attack or stroke is over the next 10 years. · Abdominal aortic aneurysm. Ask your doctor whether you should have a test to check for an aneurysm. You may need a test if you ever smoked or if your parent, brother, sister, or child has had an aneurysm. When should you call for help?  
Watch closely for changes in your health, and be sure to contact your doctor if you have any problems or symptoms that concern you. Where can you learn more? Go to http://oscar-keely.info/. Enter N725 in the search box to learn more about \"Well Visit, Men 48 to 72: Care Instructions. \" Current as of: July 19, 2016 Content Version: 11.2 © 6729-1252 [employer] Care instructions adapted under license by [employer] (which disclaims liability or warranty for this information). If you have questions about a medical condition or this instruction, always ask your healthcare professional. [address] any warranty or liability for your use of this information. Introducing [facility]! Dear Rolf De La Cruz: 
Thank you for requesting a [employer] account. Our records indicate that you have previously registered for a [employer] account but its currently inactive. Please call our [employer] support line at [phone]. Additional Information If you have questions, please visit the Frequently Asked Questions section of the [employer] website at [email or web address] Remember, [employer] is NOT to be used for urgent needs. For medical emergencies, dial 911. Now available from your iPhone and Android! Please provide this summary of care documentation to your next provider. Your primary care clinician is listed as Sarika Carmona. If you have any questions after today's visit, please call [phone].

## 2017-06-15 NOTE — PATIENT INSTRUCTIONS
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
## 2017-06-15 NOTE — PROGRESS NOTES
Identified pt with two pt identifiers(name and ). Chief Complaint   Patient presents with   [address] Maintenance Due   Topic    FOBT Q 1 YEAR AGE 50-75        Wt Readings from Last 3 Encounters:   06/15/17 197 lb (89.4 kg)   17 200 lb (90.7 kg)   17 199 lb (90.3 kg)     Temp Readings from Last 3 Encounters:   06/15/17 97.8 °F (36.6 °C) (Oral)   17 98.1 °F (36.7 °C)   05/10/17 97.7 °F (36.5 °C)     BP Readings from Last 3 Encounters:   06/15/17 122/80   17 127/87   17 128/80     Pulse Readings from Last 3 Encounters:   06/15/17 74   17 90   17 86         Learning Assessment:  :     Learning Assessment 2016 12/10/2015   PRIMARY LEARNER Patient Patient   HIGHEST LEVEL OF EDUCATION - PRIMARY LEARNER  2 YEARS OF COLLEGE -   CO-LEARNER CAREGIVER No No   PRIMARY LANGUAGE ENGLISH ENGLISH   LEARNER PREFERENCE PRIMARY LISTENING LISTENING   ANSWERED BY patient patient   RELATIONSHIP SELF SELF       Depression Screening:  :     PHQ over the last two weeks 6/15/2017   Little interest or pleasure in doing things Not at all   Feeling down, depressed or hopeless Not at all   Total Score PHQ 2 0           Abuse Screening:  :     Abuse Screening Questionnaire 2017   Do you ever feel afraid of your partner? N   Are you in a relationship with someone who physically or mentally threatens you? N   Is it safe for you to go home? Y       Coordination of Care Questionnaire:  :     1) Have you been to an emergency room, urgent care clinic since your last visit? Yes [facility] 17  Hospitalized since your last visit? no             2) Have you seen or consulted any other health care providers outside of [address] [place] [address] since your last visit? no  (Include any pap smears or colon screenings in this section.)    3) Do you have an Advance Directive on file?  no  Are you interested in receiving information about Advance Directives? no    Patient is accompanied by self I have received verbal consent from Melody Rodriguez to discuss any/all medical information while they are present in the room. Reviewed record in preparation for visit and have obtained necessary documentation. Medication reconciliation up to date and corrected with patient at this time.

## 2017-07-19 ENCOUNTER — OFFICE VISIT (OUTPATIENT)
Dept: NEUROLOGY | Age: 52
End: 2017-07-19

## 2017-07-19 VITALS
HEIGHT: 67 IN | SYSTOLIC BLOOD PRESSURE: 126 MMHG | DIASTOLIC BLOOD PRESSURE: 84 MMHG | WEIGHT: 192 LBS | BODY MASS INDEX: 30.13 KG/M2

## 2017-07-19 DIAGNOSIS — R51.9 FREQUENT HEADACHES: Primary | ICD-10-CM

## 2017-07-19 RX ORDER — TOPIRAMATE 25 MG/1
TABLET ORAL
Qty: 90 TAB | Refills: 5 | Status: SHIPPED | OUTPATIENT
Start: 2017-07-19 | End: 2017-11-15 | Stop reason: SDUPTHER

## 2017-07-19 RX ORDER — SUMATRIPTAN 100 MG/1
100 TABLET, FILM COATED ORAL
Qty: 12 TAB | Refills: 6 | Status: SHIPPED | OUTPATIENT
Start: 2017-07-19 | End: 2017-11-15 | Stop reason: SDUPTHER

## 2017-07-19 RX ORDER — FLUTICASONE FUROATE AND VILANTEROL 200; 25 UG/1; UG/1
1 POWDER RESPIRATORY (INHALATION) DAILY
COMMUNITY
End: 2017-11-15 | Stop reason: SDUPTHER

## 2017-07-19 NOTE — PROGRESS NOTES
Pt states headaches are not as bad and topamax took a long time to get used to.  Pt has approx 12 headaches a month lasting 2 hours minimum

## 2017-07-19 NOTE — PATIENT INSTRUCTIONS
[facility]   Statement to Patients  April 1, 2014      In an effort to ensure the large volume of patient prescription refills is processed in the most efficient and expeditious manner, we are asking our patients to assist us by calling your Pharmacy for all prescription refills, this will include also your  Mail Order Pharmacy. The pharmacy will contact our office electronically to continue the refill process. Please do not wait until the last minute to call your pharmacy. We need at least 48 hours (2days) to fill prescriptions. We also encourage you to call your pharmacy before going to  your prescription to make sure it is ready. With regard to controlled substance prescription refill requests (narcotic refills) that need to be picked up at our office, we ask your cooperation by providing us with at least 72 hours (3days) notice that you will need a refill. We will not refill narcotic prescription refill requests after 4:00pm on any weekday, Monday through Thursday, or after 2:00pm on Fridays, or on the weekends. We encourage everyone to explore another way of getting your prescription refill request processed using [employer], our patient web portal through our electronic medical record system. [employer]o is an efficient and effective way to communicate your medication request directly to the office and  downloadable as an naren on your smart phone . [employer] also features a review functionality that allows you to view your medication list as well as leave messages for your physician. Are you ready to get connected? If so please review the attatched instructions or speak to any of our staff to get you set up right away! Thank you so much for your cooperation. Should you have any questions please contact our Practice Administrator.     The Physicians and Staff,  [facility]
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
## 2017-07-19 NOTE — MR AVS SNAPSHOT
Visit Information Date & Time Provider Department Dept. Phone Encounter #  
 7/19/2017 11:30 AM Nayan Everett NP [facility] [phone] [id number] Follow-up Instructions Return in about 2 months (around 9/19/2017). Your Appointments 5/31/2018  1:20 PM  
ESTABLISHED PATIENT with Bucky Elias MD  
[facility] ([address]) Appt Note: annual  
 [address] [facility] [address] [facility]  
[address] [facility] [address] [address] Upcoming Health Maintenance Date Due FOBT Q 1 YEAR AGE 50-75 7/20/2017* INFLUENZA AGE 9 TO ADULT 8/1/2017 DTaP/Tdap/Td series (2 - Td) 1/20/2027 *Topic was postponed. The date shown is not the original due date. Allergies as of 7/19/2017  Review Complete On: 7/19/2017 By: Manoj Pastrana Severity Noted Reaction Type Reactions Codeine High 01/30/2017    Anaphylaxis Venom-honey Bee High 08/12/2015    Anaphylaxis Methadone  08/12/2015    Rash Neurontin [Gabapentin]  08/12/2015    Vertigo Penicillins  08/12/2015    Rash Pt. States he takes Keflex with no difficulties or adverse reactions Trilafon  08/12/2015    Other (comments) Tardive dyskinesia Current Immunizations  Never Reviewed Name Date Tdap 1/20/2017 Not reviewed this visit You Were Diagnosed With   
  
 Codes Comments Frequent headaches    -  Primary ICD-10-CM: L03 ICD-9-CM: 079. 0 Vitals BP Height(growth percentile) Weight(growth percentile) BMI Smoking Status 126/84 5' 7\" (1.702 m) 192 lb (87.1 kg) 30.07 kg/m2 Current Every Day Smoker BMI and BSA Data Body Mass Index Body Surface Area 30.07 kg/m 2 2.03 m 2 Preferred Pharmacy Pharmacy Name Phone [facility]/PHARMACY #[id number] - Claudette Bellis - [address] [facility] [address] [phone] Your Updated Medication List  
  
 This list is accurate as of: 7/19/17  1:00 PM.  Always use your most recent med list.  
  
  
  
  
 BREO ELLIPTA 200-25 mcg/dose inhaler Generic drug:  fluticasone-vilanterol Take 1 Puff by inhalation daily. cyclobenzaprine 10 mg tablet Commonly known as:  FLEXERIL Take 1 Tab by mouth three (3) times daily as needed for Muscle Spasm(s). EPIPEN 2-ERINN 0.3 mg/0.3 mL injection Generic drug:  EPINEPHrine INJECT 0.3 ML INTRAMUSCULARLY ONCE AS NEEDED FOR UP TO ONE DOSE  
  
 furosemide 20 mg tablet Commonly known as:  LASIX Take 1 Tab by mouth daily. promethazine 25 mg tablet Commonly known as:  PHENERGAN Take 25 mg by mouth every six (6) hours as needed for Nausea. PROTONIX 40 mg tablet Generic drug:  pantoprazole Take 40 mg by mouth two (2) times a day. SUMAtriptan 100 mg tablet Commonly known as:  IMITREX Take 1 Tab by mouth once as needed for Migraine for up to 1 dose. topiramate 25 mg tablet Commonly known as:  TOPAMAX Take 3 tablets by mouth nightly VENTOLIN HFA 90 mcg/actuation inhaler Generic drug:  albuterol TAKE 1 PUFF BY INHALATION EVERY FOUR (4) HOURS AS NEEDED FOR WHEEZING. Prescriptions Sent to Pharmacy Refills  
 topiramate (TOPAMAX) 25 mg tablet 5 Sig: Take 3 tablets by mouth nightly Class: Normal  
 Pharmacy: [facility]/pharmacy #[id number]  Shayla Br[address] Ph #: [phone] SUMAtriptan (IMITREX) 100 mg tablet 6 Sig: Take 1 Tab by mouth once as needed for Migraine for up to 1 dose. Class: Normal  
 Pharmacy: [facility]/pharmacy #[id number]  Maribeth Fothergill[address] Ph #: [phone] Route: Oral  
  
Follow-up Instructions Return in about 2 months (around 9/19/2017). Patient Instructions PRESCRIPTION REFILL POLICY [facility] Statement to Patients April 1, 2014 In an effort to ensure the large volume of patient prescription refills is processed in the most efficient and expeditious manner, we are asking our patients to assist us by calling your Pharmacy for all prescription refills, this will include also your  Mail Order Pharmacy. The pharmacy will contact our office electronically to continue the refill process. Please do not wait until the last minute to call your pharmacy. We need at least 48 hours (2days) to fill prescriptions. We also encourage you to call your pharmacy before going to  your prescription to make sure it is ready. With regard to controlled substance prescription refill requests (narcotic refills) that need to be picked up at our office, we ask your cooperation by providing us with at least 72 hours (3days) notice that you will need a refill. We will not refill narcotic prescription refill requests after 4:00pm on any weekday, Monday through Thursday, or after 2:00pm on Fridays, or on the weekends. We encourage everyone to explore another way of getting your prescription refill request processed using [employer], our patient web portal through our electronic medical record system. [employer] is an efficient and effective way to communicate your medication request directly to the office and  downloadable as an naren on your smart phone . [employer] also features a review functionality that allows you to view your medication list as well as leave messages for your physician. Are you ready to get connected? If so please review the attatched instructions or speak to any of our staff to get you set up right away! Thank you so much for your cooperation. Should you have any questions please contact our Practice Administrator. The Physicians and Staff,  [facility] Introducing [facility] SERVICES! Dear Leroy Mcmanus: 
Thank you for requesting a [employer] account.   Our records indicate that you have previously registered for a [employer] account but its currently inactive. Please call our [email or web address] support line at [phone]. Additional Information If you have questions, please visit the Frequently Asked Questions section of the [email or web address] website at [email or web address] Remember, [email or web address] is NOT to be used for urgent needs. For medical emergencies, dial 911. Now available from your iPhone and Android! Please provide this summary of care documentation to your next provider. Your primary care clinician is listed as Sarika Carmona. If you have any questions after today's visit, please call [phone].

## 2017-08-28 ENCOUNTER — OFFICE VISIT (OUTPATIENT)
Dept: FAMILY MEDICINE CLINIC | Age: 52
End: 2017-08-28

## 2017-08-28 VITALS
RESPIRATION RATE: 18 BRPM | SYSTOLIC BLOOD PRESSURE: 115 MMHG | TEMPERATURE: 98.9 F | HEART RATE: 80 BPM | HEIGHT: 67 IN | DIASTOLIC BLOOD PRESSURE: 74 MMHG | WEIGHT: 193 LBS | BODY MASS INDEX: 30.29 KG/M2 | OXYGEN SATURATION: 97 %

## 2017-08-28 DIAGNOSIS — J01.00 ACUTE NON-RECURRENT MAXILLARY SINUSITIS: Primary | ICD-10-CM

## 2017-08-28 RX ORDER — CYCLOBENZAPRINE HCL 10 MG
10 TABLET ORAL
COMMUNITY
Start: 2017-04-14 | End: 2017-08-28 | Stop reason: SDUPTHER

## 2017-08-28 RX ORDER — ALBUTEROL SULFATE 90 UG/1
AEROSOL, METERED RESPIRATORY (INHALATION)
COMMUNITY
Start: 2017-04-14 | End: 2017-08-28 | Stop reason: SDUPTHER

## 2017-08-28 RX ORDER — PREDNISONE 10 MG/1
TABLET ORAL
Refills: 0 | COMMUNITY
Start: 2017-06-06 | End: 2017-08-28 | Stop reason: ALTCHOICE

## 2017-08-28 RX ORDER — FUROSEMIDE 20 MG/1
20 TABLET ORAL
COMMUNITY
Start: 2017-05-22 | End: 2017-08-28 | Stop reason: SDUPTHER

## 2017-08-28 RX ORDER — SULFAMETHOXAZOLE AND TRIMETHOPRIM 800; 160 MG/1; MG/1
1 TABLET ORAL 2 TIMES DAILY
Qty: 20 TAB | Refills: 0 | Status: SHIPPED | OUTPATIENT
Start: 2017-08-28 | End: 2017-09-07

## 2017-08-28 RX ORDER — PANTOPRAZOLE SODIUM 40 MG/1
40 TABLET, DELAYED RELEASE ORAL
COMMUNITY
End: 2017-08-28 | Stop reason: SDUPTHER

## 2017-08-28 RX ORDER — EPINEPHRINE 0.3 MG/.3ML
INJECTION SUBCUTANEOUS
COMMUNITY
Start: 2017-04-14 | End: 2017-11-15 | Stop reason: SDUPTHER

## 2017-08-28 NOTE — PROGRESS NOTES
Chief Complaint   Patient presents with    Nasal Congestion     started 2 days ago with congestion, drainage and face hurts so bad his nose feels broken     \"REVIEWED RECORD IN PREPARATION FOR VISIT AND HAVE OBTAINED THE NECESSARY DOCUMENTATION\"  1. Have you been to the ER, urgent care clinic since your last visit? Hospitalized since your last visit? No    2. Have you seen or consulted any other health care providers outside of the [address] [place] [address] since your last visit? Include any pap smears or colon screening. No  Patient does not have advanced directives.

## 2017-08-28 NOTE — PATIENT INSTRUCTIONS
Sinusitis: Care Instructions  Your Care Instructions    Sinusitis is an infection of the lining of the sinus cavities in your head. Sinusitis often follows a cold. It causes pain and pressure in your head and face. In most cases, sinusitis gets better on its own in 1 to 2 weeks. But some mild symptoms may last for several weeks. Sometimes antibiotics are needed. Follow-up care is a key part of your treatment and safety. Be sure to make and go to all appointments, and call your doctor if you are having problems. It's also a good idea to know your test results and keep a list of the medicines you take. How can you care for yourself at home? · Take an over-the-counter pain medicine, such as acetaminophen (Tylenol), ibuprofen (Advil, Motrin), or naproxen (Aleve). Read and follow all instructions on the label. · If the doctor prescribed antibiotics, take them as directed. Do not stop taking them just because you feel better. You need to take the full course of antibiotics. · Be careful when taking over-the-counter cold or flu medicines and Tylenol at the same time. Many of these medicines have acetaminophen, which is Tylenol. Read the labels to make sure that you are not taking more than the recommended dose. Too much acetaminophen (Tylenol) can be harmful. · Breathe warm, moist air from a steamy shower, a hot bath, or a sink filled with hot water. Avoid cold, dry air. Using a humidifier in your home may help. Follow the directions for cleaning the machine. · Use saline (saltwater) nasal washes to help keep your nasal passages open and wash out mucus and bacteria. You can buy saline nose drops at a grocery store or drugstore. Or you can make your own at home by adding 1 teaspoon of salt and 1 teaspoon of baking soda to 2 cups of distilled water. If you make your own, fill a bulb syringe with the solution, insert the tip into your nostril, and squeeze gently. Aubery Neat your nose.   · Put a hot, wet towel or a warm gel pack on your face 3 or 4 times a day for 5 to 10 minutes each time. · Try a decongestant nasal spray like oxymetazoline (Afrin). Do not use it for more than 3 days in a row. Using it for more than 3 days can make your congestion worse. When should you call for help? Call your doctor now or seek immediate medical care if:  · You have new or worse swelling or redness in your face or around your eyes. · You have a new or higher fever. Watch closely for changes in your health, and be sure to contact your doctor if:  · You have new or worse facial pain. · The mucus from your nose becomes thicker (like pus) or has new blood in it. · You are not getting better as expected. Where can you learn more? Go to http://oscar-keely.info/. Enter T174 in the search box to learn more about \"Sinusitis: Care Instructions. \"  Current as of: July 29, 2016  Content Version: 11.3  © 0129-4918 [employer]. Care instructions adapted under license by [employer] (which disclaims liability or warranty for this information). If you have questions about a medical condition or this instruction, always ask your healthcare professional. [place] [address] any warranty or liability for your use of this information.

## 2017-08-28 NOTE — PROGRESS NOTES
HISTORY OF PRESENT ILLNESS  Elton Montgomery is a 45 y.o. male. HPI  Pt presents with \"nasal congestion\"    Symptoms have been present for about 4 days, and seems to be worsening daily  Sinus pain and pressure  Thick nasal congestion  Nose is so sore and has so much pressure that \"It feels like it is broken\". Fever: none  OTC: 12 hour nasal spray  Mild cough, due to drainainge  Review of Systems   Constitutional: Negative for fever. HENT: Positive for congestion and sinus pain. Negative for ear pain. Respiratory: Positive for cough. Negative for sputum production and shortness of breath. Gastrointestinal: Negative for diarrhea and vomiting. Physical Exam   Constitutional: He is oriented to person, place, and time. He appears well-developed and well-nourished. HENT:   Head: Normocephalic and atraumatic. Right Ear: Hearing, tympanic membrane, external ear and ear canal normal.   Left Ear: Hearing, tympanic membrane, external ear and ear canal normal.   Nose: Mucosal edema and rhinorrhea present. Right sinus exhibits maxillary sinus tenderness. Left sinus exhibits maxillary sinus tenderness. Mouth/Throat: Oropharynx is clear and moist.   Neck: Normal range of motion. Neck supple. Cardiovascular: Normal rate, regular rhythm and normal heart sounds. Pulmonary/Chest: Effort normal and breath sounds normal.   Lymphadenopathy:     He has no cervical adenopathy. Neurological: He is alert and oriented to person, place, and time. Skin: Skin is warm and dry. Psychiatric: He has a normal mood and affect. His behavior is normal.       ASSESSMENT and PLAN    ICD-10-CM ICD-9-CM    1. Acute non-recurrent maxillary sinusitis J01.00 461.0 trimethoprim-sulfamethoxazole (BACTRIM DS, SEPTRA DS) 160-800 mg per tablet     Informed patient to take the antibiotics as prescribed, with food. Educated about staying well hydrated, and continuing to use the nasal spray as prescribed.     Pt informed to return to office with worsening of symptoms, or PRN with any questions or concerns. Pt verbalizes understanding of plan of care and denies further questions or concerns at this time.

## 2017-08-28 NOTE — MR AVS SNAPSHOT
Visit Information Date & Time Provider Department Dept. Phone Encounter #  
 8/28/2017  2:30 PM Olivier Joe  [address] [phone] [id number] Follow-up Instructions Return if symptoms worsen or fail to improve. Your Appointments 9/19/2017 11:30 AM  
Any with Tavares Conner NP [address] [facility] ([address]) Appt Note: 2 month f/u headaches [place] [address] [facility] 99 [phone]  
  
   
 [place] [address] [address] [address] 5/31/2018  1:20 PM  
ESTABLISHED PATIENT with Lex Lyles MD  
[facility] ([address]) Appt Note: annual  
 [address] [facility] [place] [address] 1007 [facility]  
[address] [facility] [place] [address] [address] Upcoming Health Maintenance Date Due FOBT Q 1 YEAR AGE 50-75 2/27/2013 INFLUENZA AGE 9 TO ADULT 8/1/2017 MEDICARE YEARLY EXAM 6/16/2018 DTaP/Tdap/Td series (2 - Td) 1/20/2027 Allergies as of 8/28/2017  Review Complete On: 8/28/2017 By: Olivier Joe NP Severity Noted Reaction Type Reactions Codeine High 01/30/2017    Anaphylaxis Venom-honey Bee High 08/12/2015    Anaphylaxis Methadone  08/12/2015    Rash Neurontin [Gabapentin]  08/12/2015    Vertigo Penicillins  08/12/2015    Rash Pt. States he takes Keflex with no difficulties or adverse reactions Trilafon  08/12/2015    Other (comments) Tardive dyskinesia Current Immunizations  Never Reviewed Name Date Tdap 1/20/2017 Not reviewed this visit You Were Diagnosed With   
  
 Codes Comments Acute non-recurrent maxillary sinusitis    -  Primary ICD-10-CM: J01.00 ICD-9-CM: 461.0 Vitals BP Pulse Temp Resp Height(growth percentile) Weight(growth percentile) 115/74 (BP 1 Location: Left arm, BP Patient Position: Sitting) 80 98.9 °F (37.2 °C) (Oral) 18 5' 7\" (1.702 m) 193 lb (87.5 kg) SpO2 BMI Smoking Status 97% 30.23 kg/m2 Current Every Day Smoker BMI and BSA Data Body Mass Index Body Surface Area  
 30.23 kg/m 2 2.03 m 2 Preferred Pharmacy Pharmacy Name Phone [facility]/PHARMACY #[id number] - [place] - [address] [facility] [address] [phone] Your Updated Medication List  
  
   
This list is accurate as of: 8/28/17  2:40 PM.  Always use your most recent med list.  
  
  
  
  
 BREO ELLIPTA 200-25 mcg/dose inhaler Generic drug:  fluticasone-vilanterol Take 1 Puff by inhalation daily. cyclobenzaprine 10 mg tablet Commonly known as:  FLEXERIL Take 1 Tab by mouth three (3) times daily as needed for Muscle Spasm(s). * EPIPEN 2-ERINN 0.3 mg/0.3 mL injection Generic drug:  EPINEPHrine INJECT 0.3 ML INTRAMUSCULARLY ONCE AS NEEDED FOR UP TO ONE DOSE  
  
 * EPINEPHrine 0.3 mg/0.3 mL injection Commonly known as:  Aristeo Hock INJECT 0.3 ML INTRAMUSCULARLY ONCE AS NEEDED FOR UP TO ONE DOSE  
  
 furosemide 20 mg tablet Commonly known as:  LASIX Take 1 Tab by mouth daily. promethazine 25 mg tablet Commonly known as:  PHENERGAN Take 25 mg by mouth every six (6) hours as needed for Nausea. PROTONIX 40 mg tablet Generic drug:  pantoprazole Take 40 mg by mouth two (2) times a day. SUMAtriptan 100 mg tablet Commonly known as:  IMITREX Take 1 Tab by mouth once as needed for Migraine for up to 1 dose. topiramate 25 mg tablet Commonly known as:  TOPAMAX Take 3 tablets by mouth nightly  
  
 trimethoprim-sulfamethoxazole 160-800 mg per tablet Commonly known as:  BACTRIM DS, SEPTRA DS Take 1 Tab by mouth two (2) times a day for 10 days. VENTOLIN HFA 90 mcg/actuation inhaler Generic drug:  albuterol TAKE 1 PUFF BY INHALATION EVERY FOUR (4) HOURS AS NEEDED FOR WHEEZING. * Notice: This list has 2 medication(s) that are the same as other medications prescribed for you. Read the directions carefully, and ask your doctor or other care provider to review them with you. Prescriptions Sent to Pharmacy Refills  
 trimethoprim-sulfamethoxazole (BACTRIM DS, SEPTRA DS) 160-800 mg per tablet 0 Sig: Take 1 Tab by mouth two (2) times a day for 10 days. Class: Normal  
 Pharmacy: [facility]/pharmacy #[id number] - [place], [place] - [address]  #: [phone] Route: Oral  
  
Follow-up Instructions Return if symptoms worsen or fail to improve. Patient Instructions Sinusitis: Care Instructions Your Care Instructions Sinusitis is an infection of the lining of the sinus cavities in your head. Sinusitis often follows a cold. It causes pain and pressure in your head and face. In most cases, sinusitis gets better on its own in 1 to 2 weeks. But some mild symptoms may last for several weeks. Sometimes antibiotics are needed. Follow-up care is a key part of your treatment and safety. Be sure to make and go to all appointments, and call your doctor if you are having problems. It's also a good idea to know your test results and keep a list of the medicines you take. How can you care for yourself at home? · Take an over-the-counter pain medicine, such as acetaminophen (Tylenol), ibuprofen (Advil, Motrin), or naproxen (Aleve). Read and follow all instructions on the label. · If the doctor prescribed antibiotics, take them as directed. Do not stop taking them just because you feel better. You need to take the full course of antibiotics. · Be careful when taking over-the-counter cold or flu medicines and Tylenol at the same time. Many of these medicines have acetaminophen, which is Tylenol. Read the labels to make sure that you are not taking more than the recommended dose. Too much acetaminophen (Tylenol) can be harmful. · Breathe warm, moist air from a steamy shower, a hot bath, or a sink filled with hot water. Avoid cold, dry air. Using a humidifier in your home may help. Follow the directions for cleaning the machine. · Use saline (saltwater) nasal washes to help keep your nasal passages open and wash out mucus and bacteria. You can buy saline nose drops at a grocery store or drugstore. Or you can make your own at home by adding 1 teaspoon of salt and 1 teaspoon of baking soda to 2 cups of distilled water. If you make your own, fill a bulb syringe with the solution, insert the tip into your nostril, and squeeze gently. Flakita Goad your nose. · Put a hot, wet towel or a warm gel pack on your face 3 or 4 times a day for 5 to 10 minutes each time. · Try a decongestant nasal spray like oxymetazoline (Afrin). Do not use it for more than 3 days in a row. Using it for more than 3 days can make your congestion worse. When should you call for help? Call your doctor now or seek immediate medical care if: 
· You have new or worse swelling or redness in your face or around your eyes. · You have a new or higher fever. Watch closely for changes in your health, and be sure to contact your doctor if: 
· You have new or worse facial pain. · The mucus from your nose becomes thicker (like pus) or has new blood in it. · You are not getting better as expected. Where can you learn more? Go to http://oscar-keely.info/. Enter A359 in the search box to learn more about \"Sinusitis: Care Instructions. \" Current as of: July 29, 2016 Content Version: 11.3 © 9778-8459 [employer]. Care instructions adapted under license by [employer] (which disclaims liability or warranty for this information). If you have questions about a medical condition or this instruction, always ask your healthcare professional. [address] any warranty or liability for your use of this information. Introducing [facility] & HEALTH SERVICES! Dear Annie Aguilera: 
Thank you for requesting a MtoV account. Our records indicate that you have previously registered for a MtoV account but its currently inactive. Please call our MtoV support line at [phone]. Additional Information If you have questions, please visit the Frequently Asked Questions section of the MtoV website at [email or web address] Remember, MtoV is NOT to be used for urgent needs. For medical emergencies, dial 911. Now available from your iPhone and Android! Please provide this summary of care documentation to your next provider. Your primary care clinician is listed as Sarika Carmona. If you have any questions after today's visit, please call [phone].

## 2017-11-15 ENCOUNTER — OFFICE VISIT (OUTPATIENT)
Dept: FAMILY MEDICINE CLINIC | Age: 52
End: 2017-11-15

## 2017-11-15 VITALS
HEART RATE: 76 BPM | SYSTOLIC BLOOD PRESSURE: 128 MMHG | TEMPERATURE: 98.2 F | OXYGEN SATURATION: 97 % | BODY MASS INDEX: 30.26 KG/M2 | WEIGHT: 192.8 LBS | RESPIRATION RATE: 16 BRPM | HEIGHT: 67 IN | DIASTOLIC BLOOD PRESSURE: 88 MMHG

## 2017-11-15 DIAGNOSIS — M25.562 CHRONIC PAIN OF LEFT KNEE: ICD-10-CM

## 2017-11-15 DIAGNOSIS — G89.29 CHRONIC PAIN OF LEFT KNEE: ICD-10-CM

## 2017-11-15 DIAGNOSIS — J45.20 MILD INTERMITTENT ASTHMA WITHOUT COMPLICATION: ICD-10-CM

## 2017-11-15 DIAGNOSIS — H53.9 VISUAL CHANGES: Primary | ICD-10-CM

## 2017-11-15 DIAGNOSIS — R51.9 FREQUENT HEADACHES: ICD-10-CM

## 2017-11-15 NOTE — MR AVS SNAPSHOT
Visit Information Date & Time Provider Department Dept. Phone Encounter #  
 11/15/2017  1:30 PM Janie Mark  [phone] Follow-up Instructions Return if symptoms worsen or fail to improve. Your Appointments 5/31/2018  1:20 PM  
ESTABLISHED PATIENT with Royer Mancuso MD  
[facility] (Rhianna Price) Appt Note: annual  
 [address] [facility] [address] 1007 [facility]  
[address] [place] [address] [address] Upcoming Health Maintenance Date Due FOBT Q 1 YEAR AGE 50-75 2/27/2013 MEDICARE YEARLY EXAM 6/16/2018 DTaP/Tdap/Td series (2 - Td) 1/20/2027 Allergies as of 11/15/2017  Review Complete On: 11/15/2017 By: Janie Mark NP Severity Noted Reaction Type Reactions Codeine High 01/30/2017    Anaphylaxis Venom-honey Bee High 08/12/2015    Anaphylaxis Methadone  08/12/2015    Rash Neurontin [Gabapentin]  08/12/2015    Vertigo Penicillins  08/12/2015    Rash Pt. States he takes Keflex with no difficulties or adverse reactions Trilafon  08/12/2015    Other (comments) Tardive dyskinesia Current Immunizations  Never Reviewed Name Date Tdap 1/20/2017 Not reviewed this visit You Were Diagnosed With   
  
 Codes Comments Visual changes    -  Primary ICD-10-CM: H53.9 ICD-9-CM: 368. 9 Chronic pain of left knee     ICD-10-CM: M25.562, G89.29 ICD-9-CM: 719.46, 338.29 Vitals BP Pulse Temp Resp Height(growth percentile) Weight(growth percentile) 128/88 (BP 1 Location: Left arm, BP Patient Position: Sitting) 76 98.2 °F (36.8 °C) (Oral) 16 5' 7\" (1.702 m) 192 lb 12.8 oz (87.5 kg) SpO2 BMI Smoking Status 97% 30.2 kg/m2 Current Every Day Smoker Vitals History BMI and BSA Data  Body Mass Index Body Surface Area  
 30.2 kg/m 2 2.03 m 2  
  
  
 Preferred Pharmacy Pharmacy Name Phone [facility]/PHARMACY #[id number] - [place] - [address] [facility] [address] [phone] Your Updated Medication List  
  
   
This list is accurate as of: 11/15/17  2:03 PM.  Always use your most recent med list.  
  
  
  
  
 BREO ELLIPTA 200-25 mcg/dose inhaler Generic drug:  fluticasone-vilanterol Take 1 Puff by inhalation daily. cyclobenzaprine 10 mg tablet Commonly known as:  FLEXERIL Take 1 Tab by mouth three (3) times daily as needed for Muscle Spasm(s). * EPIPEN 2-ERINN 0.3 mg/0.3 mL injection Generic drug:  EPINEPHrine INJECT 0.3 ML INTRAMUSCULARLY ONCE AS NEEDED FOR UP TO ONE DOSE  
  
 * EPINEPHrine 0.3 mg/0.3 mL injection Commonly known as:  Yonathan Sidle INJECT 0.3 ML INTRAMUSCULARLY ONCE AS NEEDED FOR UP TO ONE DOSE  
  
 furosemide 20 mg tablet Commonly known as:  LASIX Take 1 Tab by mouth daily. promethazine 25 mg tablet Commonly known as:  PHENERGAN Take 25 mg by mouth every six (6) hours as needed for Nausea. PROTONIX 40 mg tablet Generic drug:  pantoprazole Take 40 mg by mouth two (2) times a day. SUMAtriptan 100 mg tablet Commonly known as:  IMITREX Take 1 Tab by mouth once as needed for Migraine for up to 1 dose. topiramate 25 mg tablet Commonly known as:  TOPAMAX Take 3 tablets by mouth nightly VENTOLIN HFA 90 mcg/actuation inhaler Generic drug:  albuterol TAKE 1 PUFF BY INHALATION EVERY FOUR (4) HOURS AS NEEDED FOR WHEEZING. * Notice: This list has 2 medication(s) that are the same as other medications prescribed for you. Read the directions carefully, and ask your doctor or other care provider to review them with you. We Performed the Following REFERRAL TO OPHTHALMOLOGY [REF57 Custom] REFERRAL TO ORTHOPEDICS [TIJ638 Custom] Follow-up Instructions Return if symptoms worsen or fail to improve. Referral Information Referral ID Referred By Referred To  
  
 [id number] GABY, [address] [place], [address] Visits Status Start Date End Date 1 New Request 11/15/17 11/15/18 If your referral has a status of pending review or denied, additional information will be sent to support the outcome of this decision. Referral ID Referred By Referred To  
 [id number] LEE GRIFFIN [facility] [place] [address] [place], 21 [facility] Visits Status Start Date End Date 1 New Request 11/15/17 11/15/18 If your referral has a status of pending review or denied, additional information will be sent to support the outcome of this decision. Patient Instructions Double Vision: Care Instructions Your Care Instructions Double vision means seeing two images instead of one. To see normally, your eyes, the muscles that move them, the nerves that send images to your brain, and your brain all have to work together. A problem with any of these parts can cause double vision. Double vision can occur in one or both eyes. It can be horizontal (so the images appear side by side) or vertical (so one image appears above the other). Double vision may be caused by a muscle or nerve problem. Or it may be caused by an eye problem such as a cataract or by a brain problem such as a stroke. When the cause is found, double vision can usually be corrected. To find the cause, you may need tests. These may include blood tests and imaging tests such as MRI. You may need to follow up with an eye doctor or a brain specialist (neurologist) for more testing or treatment. The doctor has checked you carefully, but problems can develop later. If you notice any problems or new symptoms, get medical treatment right away. Follow-up care is a key part of your treatment and safety.  Be sure to make and go to all appointments, and call your doctor if you are having problems. It's also a good idea to know your test results and keep a list of the medicines you take. How can you care for yourself at home? · Do not drive or do other things that could be dangerous because of your double vision. · Make your home safe to help prevent injuries. For example, remove throw rugs and electrical cords that could cause falls. Be extra careful when you work with sharp tools or knives. · Ask another adult to stay with you until your vision gets better. When should you call for help? Call 911 anytime you think you may need emergency care. For example, call if: 
? · You have symptoms of a stroke. These may include: 
¨ Sudden numbness, tingling, weakness, or loss of movement in your face, arm, or leg, especially on only one side of your body. ¨ Sudden vision changes. ¨ Sudden trouble speaking. ¨ Sudden confusion or trouble understanding simple statements. ¨ Sudden problems with walking or balance. ¨ A sudden, severe headache that is different from past headaches. ?Call your doctor now or seek immediate medical care if: 
? · You have vision changes. ? Watch closely for changes in your health, and be sure to contact your doctor if: 
? · You do not get better as expected. Where can you learn more? Go to http://oscar-keely.info/. Enter T978 in the search box to learn more about \"Double Vision: Care Instructions. \" Current as of: March 3, 2017 Content Version: 11.4 © 5025-6825 [employer]. Care instructions adapted under license by [employer] (which disclaims liability or warranty for this information). If you have questions about a medical condition or this instruction, always ask your healthcare professional. [address] any warranty or liability for your use of this information. Introducing [facility] & HEALTH SERVICES! Dear Tavares Torres: 
Thank you for requesting a [employer] account.   Our records indicate that you have previously registered for a [employer] account but its currently inactive. Please call our [employer] support line at [phone]. Additional Information If you have questions, please visit the Frequently Asked Questions section of the [employer] website at [email or web address] Remember, [employer] is NOT to be used for urgent needs. For medical emergencies, dial 911. Now available from your iPhone and Android! Please provide this summary of care documentation to your next provider. Your primary care clinician is listed as Sarika Carmona. If you have any questions after today's visit, please call [phone].

## 2017-11-15 NOTE — PATIENT INSTRUCTIONS
Double Vision: Care Instructions  Your Care Instructions  Double vision means seeing two images instead of one. To see normally, your eyes, the muscles that move them, the nerves that send images to your brain, and your brain all have to work together. A problem with any of these parts can cause double vision. Double vision can occur in one or both eyes. It can be horizontal (so the images appear side by side) or vertical (so one image appears above the other). Double vision may be caused by a muscle or nerve problem. Or it may be caused by an eye problem such as a cataract or by a brain problem such as a stroke. When the cause is found, double vision can usually be corrected. To find the cause, you may need tests. These may include blood tests and imaging tests such as MRI. You may need to follow up with an eye doctor or a brain specialist (neurologist) for more testing or treatment. The doctor has checked you carefully, but problems can develop later. If you notice any problems or new symptoms, get medical treatment right away. Follow-up care is a key part of your treatment and safety. Be sure to make and go to all appointments, and call your doctor if you are having problems. It's also a good idea to know your test results and keep a list of the medicines you take. How can you care for yourself at home? · Do not drive or do other things that could be dangerous because of your double vision. · Make your home safe to help prevent injuries. For example, remove throw rugs and electrical cords that could cause falls. Be extra careful when you work with sharp tools or knives. · Ask another adult to stay with you until your vision gets better. When should you call for help? Call 911 anytime you think you may need emergency care. For example, call if:  ? · You have symptoms of a stroke.  These may include:  ¨ Sudden numbness, tingling, weakness, or loss of movement in your face, arm, or leg, especially on only one side of your body. ¨ Sudden vision changes. ¨ Sudden trouble speaking. ¨ Sudden confusion or trouble understanding simple statements. ¨ Sudden problems with walking or balance. ¨ A sudden, severe headache that is different from past headaches. ?Call your doctor now or seek immediate medical care if:  ? · You have vision changes. ? Watch closely for changes in your health, and be sure to contact your doctor if:  ? · You do not get better as expected. Where can you learn more? Go to http://oscar-keely.info/. Enter Q063 in the search box to learn more about \"Double Vision: Care Instructions. \"  Current as of: March 3, 2017  Content Version: 11.4  © 4773-8508 Healthwise, [employer]. Care instructions adapted under license by [employer] (which disclaims liability or warranty for this information). If you have questions about a medical condition or this instruction, always ask your healthcare professional. [address] any warranty or liability for your use of this information.

## 2017-11-15 NOTE — PROGRESS NOTES
Chief Complaint   Patient presents with    Vision Change     x 1 week ago, pt stated he notice twice after he blow his nose afterwards he started to see double for about 15-20 minutes and slowly going back to normal..Pt stated it takes a half of hour before he can see clearly, pt stated the third time it was out of no where he started to see double again and it lasted for 15-20 minutes. 1. Have you been to the ER, urgent care clinic since your last visit? Hospitalized since your last visit? No    2. Have you seen or consulted any other health care providers outside of the [address] [place] [address] since your last visit? Include any pap smears or colon screening.  No

## 2017-11-15 NOTE — PROGRESS NOTES
HISTORY OF PRESENT ILLNESS  Clora Schwab is a 45 y.o. male. HPI  Pt presents with \"visual changes\"    Pt states that about a week ago, he blew his nose, and had intense pressure in the front of his face that caused him to have double vision. This lasted for about 15 minutes, and he could not focus on anything visually. Pt states that after about 30 minutes, he could see everything clearly. This happened a couple more times, since last week. No blurred vision at this time. No pain in the eyes. No headache. No dizziness. Pt has an appointment with his ophthalmologist in December. In addition, patient states that he has been dealing with left medial knee pain for years. Pt states that he is interested in seeing an orthopedist, due to the chronic pain. Normal ROM of knee at this time. No swelling or erythema of the knee. Review of Systems   Constitutional: Negative for fever. HENT: Negative for congestion. Eyes: Positive for photophobia. Negative for pain, discharge and redness. Respiratory: Negative for cough. Gastrointestinal: Negative for diarrhea and vomiting. Physical Exam   Constitutional: He is oriented to person, place, and time. He appears well-developed and well-nourished. HENT:   Head: Normocephalic and atraumatic. Right Ear: Hearing, tympanic membrane, external ear and ear canal normal.   Left Ear: Hearing, tympanic membrane, external ear and ear canal normal.   Nose: Nose normal.   Mouth/Throat: Oropharynx is clear and moist.   Eyes: Conjunctivae and EOM are normal. Pupils are equal, round, and reactive to light. Neck: Normal range of motion. Neck supple. Cardiovascular: Normal rate, regular rhythm and normal heart sounds. Pulmonary/Chest: Effort normal and breath sounds normal.   Lymphadenopathy:     He has no cervical adenopathy. Neurological: He is alert and oriented to person, place, and time. Skin: Skin is warm and dry.    Psychiatric: He has a normal mood and affect. His behavior is normal.       ASSESSMENT and PLAN    ICD-10-CM ICD-9-CM    1. Visual changes H53.9 368.9 REFERRAL TO OPHTHALMOLOGY   2. Chronic pain of left knee M25.562 719.46 REFERRAL TO ORTHOPEDICS    G89.29 338.29      Informed patient that it is reassuring that double vision has resolved, but informed patient to call his eye doctor for an appointment sooner then December to be further assessed. Educated about always going to the ER with visual changes, headache, dizziness, etc.    Pt informed to return to office with worsening of symptoms, or PRN with any questions or concerns. Pt verbalizes understanding of plan of care and denies further questions or concerns at this time.

## 2017-11-16 RX ORDER — FLUTICASONE FUROATE AND VILANTEROL 200; 25 UG/1; UG/1
1 POWDER RESPIRATORY (INHALATION) DAILY
Qty: 3 INHALER | Refills: 1 | Status: SHIPPED | COMMUNITY
Start: 2017-11-16 | End: 2017-11-17 | Stop reason: SDUPTHER

## 2017-11-16 RX ORDER — ALBUTEROL SULFATE 90 UG/1
AEROSOL, METERED RESPIRATORY (INHALATION)
Qty: 3 INHALER | Refills: 1 | Status: SHIPPED | COMMUNITY
Start: 2017-11-16 | End: 2017-11-17 | Stop reason: SDUPTHER

## 2017-11-16 RX ORDER — TOPIRAMATE 25 MG/1
TABLET ORAL
Qty: 270 TAB | Refills: 1 | Status: SHIPPED | COMMUNITY
Start: 2017-11-16 | End: 2017-11-17 | Stop reason: SDUPTHER

## 2017-11-16 RX ORDER — EPINEPHRINE 0.3 MG/.3ML
INJECTION SUBCUTANEOUS
Qty: 2 SYRINGE | Refills: 1 | Status: SHIPPED | COMMUNITY
Start: 2017-11-16 | End: 2017-11-17 | Stop reason: SDUPTHER

## 2017-11-16 RX ORDER — PANTOPRAZOLE SODIUM 40 MG/1
40 TABLET, DELAYED RELEASE ORAL 2 TIMES DAILY
Qty: 180 TAB | Refills: 1 | Status: SHIPPED | COMMUNITY
Start: 2017-11-16 | End: 2017-11-17 | Stop reason: SDUPTHER

## 2017-11-16 RX ORDER — SUMATRIPTAN 100 MG/1
100 TABLET, FILM COATED ORAL
Qty: 36 TAB | Refills: 1 | Status: SHIPPED | COMMUNITY
Start: 2017-11-16 | End: 2017-11-17 | Stop reason: SDUPTHER

## 2017-11-17 DIAGNOSIS — R51.9 FREQUENT HEADACHES: ICD-10-CM

## 2017-11-17 DIAGNOSIS — J45.20 MILD INTERMITTENT ASTHMA WITHOUT COMPLICATION: ICD-10-CM

## 2017-11-17 RX ORDER — TOPIRAMATE 25 MG/1
TABLET ORAL
Qty: 270 TAB | Refills: 1 | Status: SHIPPED | OUTPATIENT
Start: 2017-11-17 | End: 2018-02-12 | Stop reason: SDUPTHER

## 2017-11-17 RX ORDER — FLUTICASONE FUROATE AND VILANTEROL 200; 25 UG/1; UG/1
1 POWDER RESPIRATORY (INHALATION) DAILY
Qty: 3 INHALER | Refills: 1 | Status: SHIPPED | OUTPATIENT
Start: 2017-11-17 | End: 2018-05-29 | Stop reason: SDUPTHER

## 2017-11-17 RX ORDER — PANTOPRAZOLE SODIUM 40 MG/1
40 TABLET, DELAYED RELEASE ORAL 2 TIMES DAILY
Qty: 180 TAB | Refills: 1 | Status: SHIPPED | OUTPATIENT
Start: 2017-11-17 | End: 2018-02-12 | Stop reason: SDUPTHER

## 2017-11-17 RX ORDER — EPINEPHRINE 0.3 MG/.3ML
INJECTION SUBCUTANEOUS
Qty: 2 SYRINGE | Refills: 1 | Status: SHIPPED | OUTPATIENT
Start: 2017-11-17 | End: 2018-02-12 | Stop reason: SDUPTHER

## 2017-11-17 RX ORDER — SUMATRIPTAN 100 MG/1
100 TABLET, FILM COATED ORAL
Qty: 36 TAB | Refills: 1 | Status: SHIPPED | OUTPATIENT
Start: 2017-11-17 | End: 2017-12-13 | Stop reason: SDUPTHER

## 2017-11-17 RX ORDER — ALBUTEROL SULFATE 90 UG/1
AEROSOL, METERED RESPIRATORY (INHALATION)
Qty: 3 INHALER | Refills: 1 | Status: SHIPPED | OUTPATIENT
Start: 2017-11-17 | End: 2018-05-29 | Stop reason: SDUPTHER

## 2017-11-27 RX ORDER — FUROSEMIDE 20 MG/1
20 TABLET ORAL DAILY
Qty: 90 TAB | Refills: 3 | Status: SHIPPED | OUTPATIENT
Start: 2017-11-27 | End: 2017-12-08 | Stop reason: SDUPTHER

## 2017-12-08 RX ORDER — FUROSEMIDE 20 MG/1
20 TABLET ORAL DAILY
Qty: 90 TAB | Refills: 3 | Status: SHIPPED | OUTPATIENT
Start: 2017-12-08

## 2017-12-13 ENCOUNTER — HOSPITAL ENCOUNTER (OUTPATIENT)
Dept: MRI IMAGING | Age: 52
Discharge: HOME | End: 2017-12-13
Attending: ORTHOPAEDIC SURGERY
Payer: MEDICARE

## 2017-12-13 ENCOUNTER — OFFICE VISIT (OUTPATIENT)
Dept: NEUROLOGY | Age: 52
End: 2017-12-13

## 2017-12-13 VITALS
HEIGHT: 67 IN | WEIGHT: 190 LBS | BODY MASS INDEX: 29.82 KG/M2 | SYSTOLIC BLOOD PRESSURE: 132 MMHG | DIASTOLIC BLOOD PRESSURE: 88 MMHG

## 2017-12-13 DIAGNOSIS — M25.562 ACUTE PAIN OF LEFT KNEE: ICD-10-CM

## 2017-12-13 DIAGNOSIS — M54.2 NECK PAIN: Primary | ICD-10-CM

## 2017-12-13 DIAGNOSIS — G43.909 MIGRAINE WITHOUT STATUS MIGRAINOSUS, NOT INTRACTABLE, UNSPECIFIED MIGRAINE TYPE: ICD-10-CM

## 2017-12-13 DIAGNOSIS — R51.9 FREQUENT HEADACHES: ICD-10-CM

## 2017-12-13 PROCEDURE — 73721 MRI JNT OF LWR EXTRE W/O DYE: CPT

## 2017-12-13 RX ORDER — SUMATRIPTAN 100 MG/1
100 TABLET, FILM COATED ORAL
Qty: 36 TAB | Refills: 1 | Status: SHIPPED | OUTPATIENT
Start: 2017-12-13 | End: 2018-06-05

## 2017-12-13 NOTE — PROGRESS NOTES
Patient is here for follow up. Patient states the headaches are still constant nagging pain, but he has only needed 2 Imitrex since starting the topamax 75mg.
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
## 2017-12-13 NOTE — PATIENT INSTRUCTIONS
[facility]   Statement to Patients  April 1, 2014      In an effort to ensure the large volume of patient prescription refills is processed in the most efficient and expeditious manner, we are asking our patients to assist us by calling your Pharmacy for all prescription refills, this will include also your  Mail Order Pharmacy. The pharmacy will contact our office electronically to continue the refill process. Please do not wait until the last minute to call your pharmacy. We need at least 48 hours (2days) to fill prescriptions. We also encourage you to call your pharmacy before going to  your prescription to make sure it is ready. With regard to controlled substance prescription refill requests (narcotic refills) that need to be picked up at our office, we ask your cooperation by providing us with at least 72 hours (3days) notice that you will need a refill. We will not refill narcotic prescription refill requests after 4:00pm on any weekday, Monday through Thursday, or after 2:00pm on Fridays, or on the weekends. We encourage everyone to explore another way of getting your prescription refill request processed using [email or web address], our patient web portal through our electronic medical record system. [email or web address] is an efficient and effective way to communicate your medication request directly to the office and  downloadable as an naren on your smart phone . [email or web address] also features a review functionality that allows you to view your medication list as well as leave messages for your physician. Are you ready to get connected? If so please review the attatched instructions or speak to any of our staff to get you set up right away! Thank you so much for your cooperation. Should you have any questions please contact our Practice Administrator.     The Physicians and Staff,  [facility] [id number] [address]  What is a living will?    A living will is a legal form you use to write down the kind of care you want at the end of your life. It is used by the health professionals who will treat you if you aren't able to decide for yourself. If you put your wishes in writing, your loved ones and others will know what kind of care you want. They won't need to guess. This can ease your mind and be helpful to others. A living will is not the same as an estate or property will. An estate will explains what you want to happen with your money and property after you die. Is a living will a legal document? A living will is a legal document. Each state has its own laws about living julian. If you move to another state, make sure that your living will is legal in the state where you now live. Or you might use a universal form that has been approved by many states. This kind of form can sometimes be completed and stored online. Your electronic copy will then be available wherever you have a connection to the Internet. In most cases, doctors will respect your wishes even if you have a form from a different state. · You don't need an  to complete a living will. But legal advice can be helpful if your state's laws are unclear, your health history is complicated, or your family can't agree on what should be in your living will. · You can change your living will at any time. Some people find that their wishes about end-of-life care change as their health changes. · In addition to making a living will, think about completing a medical power of  form. This form lets you name the person you want to make end-of-life treatment decisions for you (your \"health care agent\") if you're not able to. Many hospitals and nursing homes will give you the forms you need to complete a living will and a medical power of . · Your living will is used only if you can't make or communicate decisions for yourself anymore.  If you become able to make decisions again, you can accept or refuse any treatment, no matter what you wrote in your living will. · Your state may offer an online registry. This is a place where you can store your living will online so the doctors and nurses who need to treat you can find it right away. What should you think about when creating a living will? Talk about your end-of-life wishes with your family members and your doctor. Let them know what you want. That way the people making decisions for you won't be surprised by your choices. Think about these questions as you make your living will:  · Do you know enough about life support methods that might be used? If not, talk to your doctor so you know what might be done if you can't breathe on your own, your heart stops, or you're unable to swallow. · What things would you still want to be able to do after you receive life-support methods? Would you want to be able to walk? To speak? To eat on your own? To live without the help of machines? · If you have a choice, where do you want to be cared for? In your home? At a hospital or nursing home? · Do you want certain Anabaptist practices performed if you become very ill? · If you have a choice at the end of your life, where would you prefer to die? At home? In a hospital or nursing home? Somewhere else? · Would you prefer to be buried or cremated? · Do you want your organs to be donated after you die? What should you do with your living will? · Make sure that your family members and your health care agent have copies of your living will. · Give your doctor a copy of your living will to keep in your medical record. If you have more than one doctor, make sure that each one has a copy. · You may want to put a copy of your living will where it can be easily found. Where can you learn more? Go to http://oscar-keely.info/. Enter I096 in the search box to learn more about \"Learning About Living Perasael. \"  Current as of: September 24, 2016  Content Version: 11.4  © 9427-1528 [employer]. Care instructions adapted under license by [employer] (which disclaims liability or warranty for this information). If you have questions about a medical condition or this instruction, always ask your healthcare professional. [address] any warranty or liability for your use of this information. Advance Directives: Care Instructions  Your Care Instructions  An advance directive is a legal way to state your wishes at the end of your life. It tells your family and your doctor what to do if you can no longer say what you want. There are two main types of advance directives. You can change them any time that your wishes change. · A living will tells your family and your doctor your wishes about life support and other treatment. · A durable power of  for health care lets you name a person to make treatment decisions for you when you can't speak for yourself. This person is called a health care agent. If you do not have an advance directive, decisions about your medical care may be made by a doctor or a  who doesn't know you. It may help to think of an advance directive as a gift to the people who care for you. If you have one, they won't have to make tough decisions by themselves. Follow-up care is a key part of your treatment and safety. Be sure to make and go to all appointments, and call your doctor if you are having problems. It's also a good idea to know your test results and keep a list of the medicines you take. How can you care for yourself at home? · Discuss your wishes with your loved ones and your doctor. This way, there are no surprises. · Many states have a unique form. Or you might use a universal form that has been approved by many states. This kind of form can sometimes be completed and stored online.  Your electronic copy will then be available wherever you have a connection to the Internet. In most cases, doctors will respect your wishes even if you have a form from a different state. · You don't need a  to do an advance directive. But you may want to get legal advice. · Think about these questions when you prepare an advance directive:  ¨ Who do you want to make decisions about your medical care if you are not able to? Many people choose a family member or close friend. ¨ Do you know enough about life support methods that might be used? If not, talk to your doctor so you understand. ¨ What are you most afraid of that might happen? You might be afraid of having pain, losing your independence, or being kept alive by machines. ¨ Where would you prefer to die? Choices include your home, a hospital, or a nursing home. ¨ Would you like to have information about hospice care to support you and your family? ¨ Do you want to donate organs when you die? ¨ Do you want certain Rastafarian practices performed before you die? If so, put your wishes in the advance directive. · Read your advance directive every year, and make changes as needed. When should you call for help? Be sure to contact your doctor if you have any questions. Where can you learn more? Go to http://oscar-keely.info/. Enter R264 in the search box to learn more about \"Advance Directives: Care Instructions. \"  Current as of: September 24, 2016  Content Version: 11.4  © 4067-3474 [employer]. Care instructions adapted under license by [employer] (which disclaims liability or warranty for this information). If you have questions about a medical condition or this instruction, always ask your healthcare professional. [address] any warranty or liability for your use of this information.

## 2017-12-13 NOTE — MR AVS SNAPSHOT
Visit Information Date & Time Provider Department Dept. Phone Encounter #  
 12/13/2017  8:00 AM LINDSAY [facility] [phone] [id number] Follow-up Instructions Return in about 8 weeks (around 2/7/2018). Your Appointments 5/31/2018  1:20 PM  
ESTABLISHED PATIENT with Kwaku Greene MD  
[facility] ([address]) Appt Note: annual  
 [address] [facility] [place] [address] [address] [facility]  
[address] [facility] [place] [address] [address] Upcoming Health Maintenance Date Due FOBT Q 1 YEAR AGE 50-75 2/27/2013 MEDICARE YEARLY EXAM 6/16/2018 DTaP/Tdap/Td series (2 - Td) 1/20/2027 Allergies as of 12/13/2017  Review Complete On: 12/13/2017 By: Yasmeen Ibarra Severity Noted Reaction Type Reactions Codeine High 01/30/2017    Anaphylaxis Venom-honey Bee High 08/12/2015    Anaphylaxis Methadone  08/12/2015    Rash Neurontin [Gabapentin]  08/12/2015    Vertigo Penicillins  08/12/2015    Rash Pt. States he takes Keflex with no difficulties or adverse reactions Trilafon  08/12/2015    Other (comments) Tardive dyskinesia Current Immunizations  Never Reviewed Name Date Tdap 1/20/2017 Not reviewed this visit You Were Diagnosed With   
  
 Codes Comments Neck pain    -  Primary ICD-10-CM: M54.2 ICD-9-CM: 723.1 Migraine without status migrainosus, not intractable, unspecified migraine type     ICD-10-CM: G43.909 ICD-9-CM: 346.90 Vitals BP Height(growth percentile) Weight(growth percentile) BMI Smoking Status 132/88 5' 7\" (1.702 m) 190 lb (86.2 kg) 29.76 kg/m2 Current Every Day Smoker BMI and BSA Data Body Mass Index Body Surface Area  
 29.76 kg/m 2 2.02 m 2 Preferred Pharmacy Pharmacy Name Phone [address] [place] [address] [phone] Your Updated Medication List  
  
   
This list is accurate as of: 12/13/17  8:38 AM.  Always use your most recent med list.  
  
  
  
  
 albuterol 90 mcg/actuation inhaler Commonly known as:  VENTOLIN HFA  
TAKE 1 PUFF BY INHALATION EVERY FOUR (4) HOURS AS NEEDED FOR WHEEZING. cyclobenzaprine 10 mg tablet Commonly known as:  FLEXERIL Take 1 Tab by mouth three (3) times daily as needed for Muscle Spasm(s). EPINEPHrine 0.3 mg/0.3 mL injection Commonly known as:  Anila Moras INJECT 0.3 ML INTRAMUSCULARLY ONCE AS NEEDED FOR UP TO ONE DOSE  
  
 fluticasone-vilanterol 200-25 mcg/dose inhaler Commonly known as:  BREO ELLIPTA Take 1 Puff by inhalation daily. furosemide 20 mg tablet Commonly known as:  LASIX Take 1 Tab by mouth daily. pantoprazole 40 mg tablet Commonly known as:  PROTONIX Take 1 Tab by mouth two (2) times a day. SUMAtriptan 100 mg tablet Commonly known as:  IMITREX Take 1 Tab by mouth once as needed for Migraine for up to 1 dose. topiramate 25 mg tablet Commonly known as:  TOPAMAX Take 3 tablets by mouth nightly We Performed the Following REFERRAL TO PAIN MANAGEMENT [EFY706 Custom] Comments:  
 Cervical CARO Follow-up Instructions Return in about 8 weeks (around 2/7/2018). To-Do List   
 12/13/2017 6:15 PM  
(Arrive by 6:00 PM) Appointment with [facility] MRI 1 at [facility] [address] ([phone]) 1. Please bring a list or a bag of your current medications to your appointment 2. Please be sure to remove ALL hair clips, pins, extensions, etc., prior to arriving for your MRI procedure. 3. If you have any medical implants or devices, please bring associated medical card with you.  4. Bring any non [facility] films or CDs pertaining to the area being imaged with you on the day of appointment. 5. A written order with a valid diagnosis and Physicians  signature is required for all scheduled tests. 6. Check in at registration 30min before your appointment time unless you were instructed to do otherwise. [place] patients, please arrive 15 minutes prior to appointment for registration. Referral Information Referral ID Referred By Referred To  
  
 [id number] Arsenio BLAND Maciel [address] [place] [address] Phone: [phone] Fax: [phone] Visits Status Start Date End Date 1 Authorized 12/13/17 12/13/18 If your referral has a status of pending review or denied, additional information will be sent to support the outcome of this decision. Patient Instructions PRESCRIPTION REFILL POLICY [facility] Statement to Patients April 1, 2014 In an effort to ensure the large volume of patient prescription refills is processed in the most efficient and expeditious manner, we are asking our patients to assist us by calling your Pharmacy for all prescription refills, this will include also your  Mail Order Pharmacy. The pharmacy will contact our office electronically to continue the refill process. Please do not wait until the last minute to call your pharmacy. We need at least 48 hours (2days) to fill prescriptions. We also encourage you to call your pharmacy before going to  your prescription to make sure it is ready. With regard to controlled substance prescription refill requests (narcotic refills) that need to be picked up at our office, we ask your cooperation by providing us with at least 72 hours (3days) notice that you will need a refill. We will not refill narcotic prescription refill requests after 4:00pm on any weekday, Monday through Thursday, or after 2:00pm on Fridays, or on the weekends. We encourage everyone to explore another way of getting your prescription refill request processed using Octro, our patient web portal through our electronic medical record system. Octro is an efficient and effective way to communicate your medication request directly to the office and  downloadable as an naren on your smart phone . Octro also features a review functionality that allows you to view your medication list as well as leave messages for your physician. Are you ready to get connected? If so please review the attatched instructions or speak to any of our staff to get you set up right away! Thank you so much for your cooperation. Should you have any questions please contact our Practice Administrator. The Physicians and Staff,  [facility] Alaina Aguilar [address]ong [address] What is a living will? A living will is a legal form you use to write down the kind of care you want at the end of your life. It is used by the health professionals who will treat you if you aren't able to decide for yourself. If you put your wishes in writing, your loved ones and others will know what kind of care you want. They won't need to guess. This can ease your mind and be helpful to others. A living will is not the same as an estate or property will. An estate will explains what you want to happen with your money and property after you die. Is a living will a legal document? A living will is a legal document. Each state has its own laws about living julian. If you move to another state, make sure that your living will is legal in the state where you now live. Or you might use a universal form that has been approved by many states. This kind of form can sometimes be completed and stored online. Your electronic copy will then be available wherever you have a connection to the Internet. In most cases, doctors will respect your wishes even if you have a form from a different state. · You don't need an  to complete a living will. But legal advice can be helpful if your state's laws are unclear, your health history is complicated, or your family can't agree on what should be in your living will. · You can change your living will at any time. Some people find that their wishes about end-of-life care change as their health changes. · In addition to making a living will, think about completing a medical power of  form. This form lets you name the person you want to make end-of-life treatment decisions for you (your \"health care agent\") if you're not able to. Many hospitals and nursing homes will give you the forms you need to complete a living will and a medical power of . · Your living will is used only if you can't make or communicate decisions for yourself anymore. If you become able to make decisions again, you can accept or refuse any treatment, no matter what you wrote in your living will. · Your state may offer an online registry. This is a place where you can store your living will online so the doctors and nurses who need to treat you can find it right away. What should you think about when creating a living will? Talk about your end-of-life wishes with your family members and your doctor. Let them know what you want. That way the people making decisions for you won't be surprised by your choices. Think about these questions as you make your living will: · Do you know enough about life support methods that might be used? If not, talk to your doctor so you know what might be done if you can't breathe on your own, your heart stops, or you're unable to swallow. · What things would you still want to be able to do after you receive life-support methods? Would you want to be able to walk? To speak? To eat on your own? To live without the help of machines? · If you have a choice, where do you want to be cared for? In your home? At a hospital or nursing home? · Do you want certain Christianity practices performed if you become very ill? · If you have a choice at the end of your life, where would you prefer to die? At home? In a hospital or nursing home? Somewhere else? · Would you prefer to be buried or cremated? · Do you want your organs to be donated after you die? What should you do with your living will? · Make sure that your family members and your health care agent have copies of your living will. · Give your doctor a copy of your living will to keep in your medical record. If you have more than one doctor, make sure that each one has a copy. · You may want to put a copy of your living will where it can be easily found. Where can you learn more? Go to http://oscar-keely.info/. Enter S058 in the search box to learn more about \"Learning About Living Garry Malone. \" Current as of: September 24, 2016 Content Version: 11.4 © 3132-5835 [employer]. Care instructions adapted under license by [employer] (which disclaims liability or warranty for this information). If you have questions about a medical condition or this instruction, always ask your healthcare professional. [place] [address] any warranty or liability for your use of this information. Advance Directives: Care Instructions Your Care Instructions An advance directive is a legal way to state your wishes at the end of your life. It tells your family and your doctor what to do if you can no longer say what you want. There are two main types of advance directives. You can change them any time that your wishes change. · A living will tells your family and your doctor your wishes about life support and other treatment. · A durable power of  for health care lets you name a person to make treatment decisions for you when you can't speak for yourself. This person is called a health care agent. If you do not have an advance directive, decisions about your medical care may be made by a doctor or a  who doesn't know you. It may help to think of an advance directive as a gift to the people who care for you. If you have one, they won't have to make tough decisions by themselves. Follow-up care is a key part of your treatment and safety. Be sure to make and go to all appointments, and call your doctor if you are having problems. It's also a good idea to know your test results and keep a list of the medicines you take. How can you care for yourself at home? · Discuss your wishes with your loved ones and your doctor. This way, there are no surprises. · Many states have a unique form. Or you might use a universal form that has been approved by many states. This kind of form can sometimes be completed and stored online. Your electronic copy will then be available wherever you have a connection to the Internet. In most cases, doctors will respect your wishes even if you have a form from a different state. · You don't need a  to do an advance directive. But you may want to get legal advice. · Think about these questions when you prepare an advance directive: ¨ Who do you want to make decisions about your medical care if you are not able to? Many people choose a family member or close friend. ¨ Do you know enough about life support methods that might be used? If not, talk to your doctor so you understand. ¨ What are you most afraid of that might happen? You might be afraid of having pain, losing your independence, or being kept alive by machines. ¨ Where would you prefer to die? Choices include your home, a hospital, or a nursing home. ¨ Would you like to have information about hospice care to support you and your family? ¨ Do you want to donate organs when you die? ¨ Do you want certain Restorationism practices performed before you die? If so, put your wishes in the advance directive. · Read your advance directive every year, and make changes as needed. When should you call for help? Be sure to contact your doctor if you have any questions. Where can you learn more? Go to http://oscar-keely.info/. Enter R264 in the search box to learn more about \"Advance Directives: Care Instructions. \" Current as of: September 24, 2016 Content Version: 11.4 © 2166-4865 [employer]. Care instructions adapted under license by [employer] (which disclaims liability or warranty for this information). If you have questions about a medical condition or this instruction, always ask your healthcare professional. [address] any warranty or liability for your use of this information. Introducing [facility]! Dear Pastora Timmons: 
Thank you for requesting a Visante account. Our records indicate that you have previously registered for a Visante account but its currently inactive. Please call our Visante support line at [phone]. Additional Information If you have questions, please visit the Frequently Asked Questions section of the Visante website at [email or web address] Remember, Visante is NOT to be used for urgent needs. For medical emergencies, dial 911. Now available from your iPhone and Android! Please provide this summary of care documentation to your next provider. Your primary care clinician is listed as Sarika Carmona. If you have any questions after today's visit, please call [phone].

## 2017-12-18 ENCOUNTER — TELEPHONE (OUTPATIENT)
Dept: NEUROLOGY | Age: 52
End: 2017-12-18

## 2018-01-03 ENCOUNTER — TELEPHONE (OUTPATIENT)
Dept: NEUROLOGY | Age: 53
End: 2018-01-03

## 2018-01-19 ENCOUNTER — HOSPITAL ENCOUNTER (OUTPATIENT)
Dept: CT IMAGING | Age: 53
Discharge: HOME | End: 2018-01-19
Attending: ORTHOPAEDIC SURGERY
Payer: MEDICARE

## 2018-01-19 DIAGNOSIS — M17.12 PRIMARY LOCALIZED OSTEOARTHRITIS OF LEFT KNEE: ICD-10-CM

## 2018-01-19 PROCEDURE — 73700 CT LOWER EXTREMITY W/O DYE: CPT

## 2018-02-07 ENCOUNTER — OFFICE VISIT (OUTPATIENT)
Dept: NEUROLOGY | Age: 53
End: 2018-02-07

## 2018-02-07 VITALS
HEIGHT: 67 IN | RESPIRATION RATE: 20 BRPM | BODY MASS INDEX: 30.13 KG/M2 | WEIGHT: 192 LBS | DIASTOLIC BLOOD PRESSURE: 64 MMHG | SYSTOLIC BLOOD PRESSURE: 120 MMHG

## 2018-02-07 DIAGNOSIS — G43.909 MIGRAINE WITHOUT STATUS MIGRAINOSUS, NOT INTRACTABLE, UNSPECIFIED MIGRAINE TYPE: Primary | ICD-10-CM

## 2018-02-07 NOTE — PATIENT INSTRUCTIONS
Learning About Living Hawa  What is a living will? A living will is a legal form you use to write down the kind of care you want at the end of your life. It is used by the health professionals who will treat you if you aren't able to decide for yourself. If you put your wishes in writing, your loved ones and others will know what kind of care you want. They won't need to guess. This can ease your mind and be helpful to others. A living will is not the same as an estate or property will. An estate will explains what you want to happen with your money and property after you die. Is a living will a legal document? A living will is a legal document. Each state has its own laws about living julian. If you move to another state, make sure that your living will is legal in the state where you now live. Or you might use a universal form that has been approved by many states. This kind of form can sometimes be completed and stored online. Your electronic copy will then be available wherever you have a connection to the Internet. In most cases, doctors will respect your wishes even if you have a form from a different state. · You don't need an  to complete a living will. But legal advice can be helpful if your state's laws are unclear, your health history is complicated, or your family can't agree on what should be in your living will. · You can change your living will at any time. Some people find that their wishes about end-of-life care change as their health changes. · In addition to making a living will, think about completing a medical power of  form. This form lets you name the person you want to make end-of-life treatment decisions for you (your \"health care agent\") if you're not able to. Many hospitals and nursing homes will give you the forms you need to complete a living will and a medical power of .   · Your living will is used only if you can't make or communicate decisions for yourself anymore. If you become able to make decisions again, you can accept or refuse any treatment, no matter what you wrote in your living will. · Your state may offer an online registry. This is a place where you can store your living will online so the doctors and nurses who need to treat you can find it right away. What should you think about when creating a living will? Talk about your end-of-life wishes with your family members and your doctor. Let them know what you want. That way the people making decisions for you won't be surprised by your choices. Think about these questions as you make your living will:  · Do you know enough about life support methods that might be used? If not, talk to your doctor so you know what might be done if you can't breathe on your own, your heart stops, or you're unable to swallow. · What things would you still want to be able to do after you receive life-support methods? Would you want to be able to walk? To speak? To eat on your own? To live without the help of machines? · If you have a choice, where do you want to be cared for? In your home? At a hospital or nursing home? · Do you want certain Holiness practices performed if you become very ill? · If you have a choice at the end of your life, where would you prefer to die? At home? In a hospital or nursing home? Somewhere else? · Would you prefer to be buried or cremated? · Do you want your organs to be donated after you die? What should you do with your living will? · Make sure that your family members and your health care agent have copies of your living will. · Give your doctor a copy of your living will to keep in your medical record. If you have more than one doctor, make sure that each one has a copy. · You may want to put a copy of your living will where it can be easily found. Where can you learn more? Go to http://oscar-keely.info/.   Enter Y878 in the search box to learn more about \"Learning About Living [place]a. \"  Current as of: September 24, 2016  Content Version: 11.4  © 1527-5870 [employer]. Care instructions adapted under license by [employer] (which disclaims liability or warranty for this information). If you have questions about a medical condition or this instruction, always ask your healthcare professional. [address] any warranty or liability for your use of this information. Advance Directives: Care Instructions  Your Care Instructions  An advance directive is a legal way to state your wishes at the end of your life. It tells your family and your doctor what to do if you can no longer say what you want. There are two main types of advance directives. You can change them any time that your wishes change. · A living will tells your family and your doctor your wishes about life support and other treatment. · A durable power of  for health care lets you name a person to make treatment decisions for you when you can't speak for yourself. This person is called a health care agent. If you do not have an advance directive, decisions about your medical care may be made by a doctor or a  who doesn't know you. It may help to think of an advance directive as a gift to the people who care for you. If you have one, they won't have to make tough decisions by themselves. Follow-up care is a key part of your treatment and safety. Be sure to make and go to all appointments, and call your doctor if you are having problems. It's also a good idea to know your test results and keep a list of the medicines you take. How can you care for yourself at home? · Discuss your wishes with your loved ones and your doctor. This way, there are no surprises. · Many states have a unique form. Or you might use a universal form that has been approved by many states. This kind of form can sometimes be completed and stored online.  Your electronic copy will then be available wherever you have a connection to the Internet. In most cases, doctors will respect your wishes even if you have a form from a different state. · You don't need a  to do an advance directive. But you may want to get legal advice. · Think about these questions when you prepare an advance directive:  ¨ Who do you want to make decisions about your medical care if you are not able to? Many people choose a family member or close friend. ¨ Do you know enough about life support methods that might be used? If not, talk to your doctor so you understand. ¨ What are you most afraid of that might happen? You might be afraid of having pain, losing your independence, or being kept alive by machines. ¨ Where would you prefer to die? Choices include your home, a hospital, or a nursing home. ¨ Would you like to have information about hospice care to support you and your family? ¨ Do you want to donate organs when you die? ¨ Do you want certain Presybeterian practices performed before you die? If so, put your wishes in the advance directive. · Read your advance directive every year, and make changes as needed. When should you call for help? Be sure to contact your doctor if you have any questions. Where can you learn more? Go to http://oscar-keely.info/. Enter R264 in the search box to learn more about \"Advance Directives: Care Instructions. \"  Current as of: September 24, 2016  Content Version: 11.4  © 7279-4007 [employer]. Care instructions adapted under license by [employer] (which disclaims liability or warranty for this information). If you have questions about a medical condition or this instruction, always ask your healthcare professional. [place] [address] any warranty or liability for your use of this information.   [facility]   Statement to Patients  April 1, 2014      In an effort to ensure the large volume of patient prescription refills is processed in the most efficient and expeditious manner, we are asking our patients to assist us by calling your Pharmacy for all prescription refills, this will include also your  Mail Order Pharmacy. The pharmacy will contact our office electronically to continue the refill process. Please do not wait until the last minute to call your pharmacy. We need at least 48 hours (2days) to fill prescriptions. We also encourage you to call your pharmacy before going to  your prescription to make sure it is ready. With regard to controlled substance prescription refill requests (narcotic refills) that need to be picked up at our office, we ask your cooperation by providing us with at least 72 hours (3days) notice that you will need a refill. We will not refill narcotic prescription refill requests after 4:00pm on any weekday, Monday through Thursday, or after 2:00pm on Fridays, or on the weekends. We encourage everyone to explore another way of getting your prescription refill request processed using [employer], our patient web portal through our electronic medical record system. [employer] is an efficient and effective way to communicate your medication request directly to the office and  downloadable as an naren on your smart phone . [employer] also features a review functionality that allows you to view your medication list as well as leave messages for your physician. Are you ready to get connected? If so please review the attatched instructions or speak to any of our staff to get you set up right away! Thank you so much for your cooperation. Should you have any questions please contact our Practice Administrator.     The Physicians and Staff,  [facility]
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
## 2018-02-07 NOTE — MR AVS SNAPSHOT
303 [facility] [address] [facility] 99 [address] [phone] Patient: Vi Pedro MRN: [id number] :1965 Visit Information Date & Time Provider Department Dept. Phone Encounter #  
 2018  8:00 AM LINDSAY Monroemelita [facility] [phone] [id number] Follow-up Instructions Return in about 4 months (around 2018). Your Appointments 2018  1:20 PM  
ESTABLISHED PATIENT with Zachariah De Paz MD  
[facility] ([address]) Appt Note: annual  
 320 [facility] [address] 1007 [facility]  
54 [facility] [address] [address] Upcoming Health Maintenance Date Due FOBT Q 1 YEAR AGE 50-75 2013 MEDICARE YEARLY EXAM 2018 DTaP/Tdap/Td series (2 - Td) 2027 Allergies as of 2018  Review Complete On: 2017 By: Rodrigue Melvin Severity Noted Reaction Type Reactions Codeine High 2017    Anaphylaxis Venom-honey Bee High 2015    Anaphylaxis Methadone  2015    Rash Neurontin [Gabapentin]  2015    Vertigo Penicillins  2015    Rash Pt. States he takes Keflex with no difficulties or adverse reactions Trilafon  2015    Other (comments) Tardive dyskinesia Current Immunizations  Never Reviewed Name Date Tdap 2017 Not reviewed this visit You Were Diagnosed With   
  
 Codes Comments Migraine without status migrainosus, not intractable, unspecified migraine type    -  Primary ICD-10-CM: W00.177 ICD-9-CM: 346.90 Vitals BP Resp Height(growth percentile) Weight(growth percentile) BMI Smoking Status 120/64 20 5' 7\" (1.702 m) 192 lb (87.1 kg) 30.07 kg/m2 Current Every Day Smoker Vitals History BMI and BSA Data Body Mass Index Body Surface Area 30.07 kg/m 2 2.03 m 2 Preferred Pharmacy Pharmacy Name Phone [facility] [address] [place] [phone] [facility] [address] [phone] Your Updated Medication List  
  
   
This list is accurate as of: 2/7/18  8:41 AM.  Always use your most recent med list.  
  
  
  
  
 albuterol 90 mcg/actuation inhaler Commonly known as:  VENTOLIN HFA  
TAKE 1 PUFF BY INHALATION EVERY FOUR (4) HOURS AS NEEDED FOR WHEEZING. cyclobenzaprine 10 mg tablet Commonly known as:  FLEXERIL Take 1 Tab by mouth three (3) times daily as needed for Muscle Spasm(s). EPINEPHrine 0.3 mg/0.3 mL injection Commonly known as:  Vernal Lowers INJECT 0.3 ML INTRAMUSCULARLY ONCE AS NEEDED FOR UP TO ONE DOSE  
  
 fluticasone-vilanterol 200-25 mcg/dose inhaler Commonly known as:  BREO ELLIPTA Take 1 Puff by inhalation daily. furosemide 20 mg tablet Commonly known as:  LASIX Take 1 Tab by mouth daily. pantoprazole 40 mg tablet Commonly known as:  PROTONIX Take 1 Tab by mouth two (2) times a day. SUMAtriptan 100 mg tablet Commonly known as:  IMITREX Take 1 Tab by mouth once as needed for Migraine for up to 1 dose. topiramate 25 mg tablet Commonly known as:  TOPAMAX Take 3 tablets by mouth nightly Follow-up Instructions Return in about 4 months (around 6/7/2018). Patient Instructions Alaina Zhong 7330 What is a living will? A living will is a legal form you use to write down the kind of care you want at the end of your life. It is used by the health professionals who will treat you if you aren't able to decide for yourself. If you put your wishes in writing, your loved ones and others will know what kind of care you want. They won't need to guess. This can ease your mind and be helpful to others. A living will is not the same as an estate or property will.  An estate will explains what you want to happen with your money and property after you die. Is a living will a legal document? A living will is a legal document. Each state has its own laws about living julian. If you move to another state, make sure that your living will is legal in the state where you now live. Or you might use a universal form that has been approved by many states. This kind of form can sometimes be completed and stored online. Your electronic copy will then be available wherever you have a connection to the Internet. In most cases, doctors will respect your wishes even if you have a form from a different state. · You don't need an  to complete a living will. But legal advice can be helpful if your state's laws are unclear, your health history is complicated, or your family can't agree on what should be in your living will. · You can change your living will at any time. Some people find that their wishes about end-of-life care change as their health changes. · In addition to making a living will, think about completing a medical power of  form. This form lets you name the person you want to make end-of-life treatment decisions for you (your \"health care agent\") if you're not able to. Many hospitals and nursing homes will give you the forms you need to complete a living will and a medical power of . · Your living will is used only if you can't make or communicate decisions for yourself anymore. If you become able to make decisions again, you can accept or refuse any treatment, no matter what you wrote in your living will. · Your state may offer an online registry. This is a place where you can store your living will online so the doctors and nurses who need to treat you can find it right away. What should you think about when creating a living will?  
Talk about your end-of-life wishes with your family members and your doctor. Let them know what you want. That way the people making decisions for you won't be surprised by your choices. Think about these questions as you make your living will: · Do you know enough about life support methods that might be used? If not, talk to your doctor so you know what might be done if you can't breathe on your own, your heart stops, or you're unable to swallow. · What things would you still want to be able to do after you receive life-support methods? Would you want to be able to walk? To speak? To eat on your own? To live without the help of machines? · If you have a choice, where do you want to be cared for? In your home? At a hospital or nursing home? · Do you want certain Temple practices performed if you become very ill? · If you have a choice at the end of your life, where would you prefer to die? At home? In a hospital or nursing home? Somewhere else? · Would you prefer to be buried or cremated? · Do you want your organs to be donated after you die? What should you do with your living will? · Make sure that your family members and your health care agent have copies of your living will. · Give your doctor a copy of your living will to keep in your medical record. If you have more than one doctor, make sure that each one has a copy. · You may want to put a copy of your living will where it can be easily found. Where can you learn more? Go to http://oscar-keely.info/. Enter O803 in the search box to learn more about \"Learning About Living Hawa. \" Current as of: September 24, 2016 Content Version: 11.4 © 8545-2959 [employer]. Care instructions adapted under license by [employer] (which disclaims liability or warranty for this information).  If you have questions about a medical condition or this instruction, always ask your healthcare professional. [address]  any warranty or liability for your use of this information. Advance Directives: Care Instructions Your Care Instructions An advance directive is a legal way to state your wishes at the end of your life. It tells your family and your doctor what to do if you can no longer say what you want. There are two main types of advance directives. You can change them any time that your wishes change. · A living will tells your family and your doctor your wishes about life support and other treatment. · A durable power of  for health care lets you name a person to make treatment decisions for you when you can't speak for yourself. This person is called a health care agent. If you do not have an advance directive, decisions about your medical care may be made by a doctor or a  who doesn't know you. It may help to think of an advance directive as a gift to the people who care for you. If you have one, they won't have to make tough decisions by themselves. Follow-up care is a key part of your treatment and safety. Be sure to make and go to all appointments, and call your doctor if you are having problems. It's also a good idea to know your test results and keep a list of the medicines you take. How can you care for yourself at home? · Discuss your wishes with your loved ones and your doctor. This way, there are no surprises. · Many states have a unique form. Or you might use a universal form that has been approved by many states. This kind of form can sometimes be completed and stored online. Your electronic copy will then be available wherever you have a connection to the Internet. In most cases, doctors will respect your wishes even if you have a form from a different state. · You don't need a  to do an advance directive. But you may want to get legal advice. · Think about these questions when you prepare an advance directive: ¨ Who do you want to make decisions about your medical care if you are not able to? Many people choose a family member or close friend. ¨ Do you know enough about life support methods that might be used? If not, talk to your doctor so you understand. ¨ What are you most afraid of that might happen? You might be afraid of having pain, losing your independence, or being kept alive by machines. ¨ Where would you prefer to die? Choices include your home, a hospital, or a nursing home. ¨ Would you like to have information about hospice care to support you and your family? ¨ Do you want to donate organs when you die? ¨ Do you want certain Yazidism practices performed before you die? If so, put your wishes in the advance directive. · Read your advance directive every year, and make changes as needed. When should you call for help? Be sure to contact your doctor if you have any questions. Where can you learn more? Go to http://oscarINPA Systemskeely.info/. Enter R264 in the search box to learn more about \"Advance Directives: Care Instructions. \" Current as of: September 24, 2016 Content Version: 11.4 © 3786-0008 [employer]. Care instructions adapted under license by [employer] (which disclaims liability or warranty for this information). If you have questions about a medical condition or this instruction, always ask your healthcare professional. [place] [address] any warranty or liability for your use of this information. PRESCRIPTION REFILL POLICY [facility] Statement to Patients April 1, 2014 In an effort to ensure the large volume of patient prescription refills is processed in the most efficient and expeditious manner, we are asking our patients to assist us by calling your Pharmacy for all prescription refills, this will include also your  Mail Order Pharmacy. The pharmacy will contact our office electronically to continue the refill process. Please do not wait until the last minute to call your pharmacy. We need at least 48 hours (2days) to fill prescriptions. We also encourage you to call your pharmacy before going to  your prescription to make sure it is ready. With regard to controlled substance prescription refill requests (narcotic refills) that need to be picked up at our office, we ask your cooperation by providing us with at least 72 hours (3days) notice that you will need a refill. We will not refill narcotic prescription refill requests after 4:00pm on any weekday, Monday through Thursday, or after 2:00pm on Fridays, or on the weekends. We encourage everyone to explore another way of getting your prescription refill request processed using CallistoTV, our patient web portal through our electronic medical record system. CallistoTV is an efficient and effective way to communicate your medication request directly to the office and  downloadable as an naren on your smart phone . CallistoTV also features a review functionality that allows you to view your medication list as well as leave messages for your physician. Are you ready to get connected? If so please review the attatched instructions or speak to any of our staff to get you set up right away! Thank you so much for your cooperation. Should you have any questions please contact our Practice Administrator. The Physicians and Staff,  [facility] Introducing [facility]! Dear Todd Henson: 
Thank you for requesting a CallistoTV account. Our records indicate that you already have an active CallistoTV account. You can access your account anytime at [email or web address]. [email or web address] Did you know that you can access your hospital and ER discharge instructions at any time in CallistoTV? You can also review all of your test results from your hospital stay or ER visit. Additional Information If you have questions, please visit the Frequently Asked Questions section of the iconDialhart website at [email or web address] Remember, Guardly is NOT to be used for urgent needs. For medical emergencies, dial 911. Now available from your iPhone and Android! Please provide this summary of care documentation to your next provider. Your primary care clinician is listed as Sarika Carmona. If you have any questions after today's visit, please call [phone].

## 2018-02-08 RX ORDER — DICLOFENAC SODIUM 75 MG/1
75 TABLET, DELAYED RELEASE ORAL 2 TIMES DAILY
COMMUNITY
End: 2018-02-12 | Stop reason: SDUPTHER

## 2018-02-12 DIAGNOSIS — R51.9 FREQUENT HEADACHES: ICD-10-CM

## 2018-02-12 NOTE — TELEPHONE ENCOUNTER
LOV 11/15/17. Labs 12/28/16. Scheduled for pre op labs 2/18/18. Knee surgery 2/19/18 with Dr. Suzanne Worley.

## 2018-02-13 ENCOUNTER — HOSPITAL ENCOUNTER (EMERGENCY)
Age: 53
Discharge: HOME | End: 2018-02-13
Attending: EMERGENCY MEDICINE
Payer: MEDICARE

## 2018-02-13 VITALS
BODY MASS INDEX: 30.13 KG/M2 | SYSTOLIC BLOOD PRESSURE: 149 MMHG | HEIGHT: 67 IN | OXYGEN SATURATION: 93 % | HEART RATE: 77 BPM | WEIGHT: 192 LBS | TEMPERATURE: 97.8 F | RESPIRATION RATE: 18 BRPM | DIASTOLIC BLOOD PRESSURE: 99 MMHG

## 2018-02-13 DIAGNOSIS — S61.210A LACERATION OF RIGHT INDEX FINGER WITHOUT FOREIGN BODY WITHOUT DAMAGE TO NAIL, INITIAL ENCOUNTER: Primary | ICD-10-CM

## 2018-02-13 PROCEDURE — 99283 EMERGENCY DEPT VISIT LOW MDM: CPT

## 2018-02-13 RX ORDER — TOPIRAMATE 25 MG/1
TABLET ORAL
Qty: 270 TAB | Refills: 1 | Status: SHIPPED | OUTPATIENT
Start: 2018-02-13 | End: 2018-02-14 | Stop reason: SDUPTHER

## 2018-02-13 RX ORDER — EPINEPHRINE 0.3 MG/.3ML
INJECTION SUBCUTANEOUS
Qty: 2 SYRINGE | Refills: 1 | Status: SHIPPED | OUTPATIENT
Start: 2018-02-13 | End: 2018-02-14 | Stop reason: SDUPTHER

## 2018-02-13 RX ORDER — DICLOFENAC SODIUM 75 MG/1
75 TABLET, DELAYED RELEASE ORAL 2 TIMES DAILY
Qty: 60 TAB | Refills: 0 | Status: SHIPPED | OUTPATIENT
Start: 2018-02-13 | End: 2018-02-14 | Stop reason: SDUPTHER

## 2018-02-13 RX ORDER — ACETAMINOPHEN 500 MG
1000 TABLET ORAL ONCE
Status: DISCONTINUED | OUTPATIENT
Start: 2018-02-13 | End: 2018-02-13 | Stop reason: HOSPADM

## 2018-02-13 RX ORDER — PANTOPRAZOLE SODIUM 40 MG/1
40 TABLET, DELAYED RELEASE ORAL 2 TIMES DAILY
Qty: 180 TAB | Refills: 1 | Status: SHIPPED | OUTPATIENT
Start: 2018-02-13 | End: 2018-02-14 | Stop reason: SDUPTHER

## 2018-02-13 NOTE — DISCHARGE INSTRUCTIONS
Cuts Left Open: Care Instructions  Your Care Instructions    A cut can happen anywhere on your body. Sometimes a cut can injure the tendons, blood vessels, or nerves. A cut may be left open instead of being closed with stitches, staples, or adhesive. A cut may be left open when it is likely to become infected, because closing it can make infection even more likely. You will probably have a bandage. The doctor may want the cut to stay open the whole time it heals. This happens with some cuts when too much time has gone by since the cut happened. Or the doctor may tell you to come back to have the cut closed in 4 to 5 days, when there is less chance of infection. If the cut stays open while healing, your scar may be larger than if the cut was closed. But you can get treatment later to make the scar smaller. The doctor has checked you carefully, but problems can develop later. If you notice any problems or new symptoms, get medical treatment right away. Follow-up care is a key part of your treatment and safety. Be sure to make and go to all appointments, and call your doctor if you are having problems. It's also a good idea to know your test results and keep a list of the medicines you take. How can you care for yourself at home? · Keep the cut dry for the first 24 to 48 hours. After this, you can shower if your doctor okays it. Pat the cut dry. · Don't soak the cut, such as in a bathtub. Your doctor will tell you when it's safe to get the cut wet. · If your doctor told you how to care for your cut, follow your doctor's instructions. If you did not get instructions, follow this general advice:  ¨ After the first 24 to 48 hours, wash the cut with clean water 2 times a day. Don't use hydrogen peroxide or alcohol, which can slow healing. ¨ You may cover the cut with a thin layer of petroleum jelly, such as Vaseline, and a nonstick bandage.   ¨ Apply more petroleum jelly and replace the bandage as needed. · Prop up the injured area on a pillow anytime you sit or lie down during the next 3 days. Try to keep it above the level of your heart. This will help reduce swelling. · Avoid any activity that could cause your cut to get worse. · Take pain medicines exactly as directed. ¨ If the doctor gave you a prescription medicine for pain, take it as prescribed. ¨ If you are not taking a prescription pain medicine, ask your doctor if you can take an over-the-counter medicine. When should you call for help? Call your doctor now or seek immediate medical care if:  ? · You have new pain, or your pain gets worse. ? · The cut starts to bleed, and blood soaks through the bandage. Oozing small amounts of blood is normal.   ? · The skin near the cut is cold or pale or changes color. ? · You have tingling, weakness, or numbness near the cut.   ? · You have trouble moving the area near the cut.   ? · You have symptoms of infection, such as:  ¨ Increased pain, swelling, warmth, or redness around the cut. ¨ Red streaks leading from the cut. ¨ Pus draining from the cut. ¨ A fever. ? Watch closely for changes in your health, and be sure to contact your doctor if:  ? · The cut is not closing (getting smaller). ? · You do not get better as expected. Where can you learn more? Go to http://oscar-keely.info/. Enter [id number] in the search box to learn more about \"Cuts Left Open: Care Instructions. \"  Current as of: March 20, 2017  Content Version: 11.4  © 0730-0266 [employer]. Care instructions adapted under license by [employer] (which disclaims liability or warranty for this information). If you have questions about a medical condition or this instruction, always ask your healthcare professional. [place] [address] any warranty or liability for your use of this information.

## 2018-02-13 NOTE — ED TRIAGE NOTES
Pt ambulatory to treatment area with c/o \"I was reaching into a dark closet and I cut my right hand on some broad-tipped arrows. \"  4cm lac noted to base of R 2nd finger. Pt reports last tetanus was in 2015 or 2016. Bleeding controlled at this time.
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
## 2018-02-14 ENCOUNTER — OFFICE VISIT (OUTPATIENT)
Dept: CARDIOLOGY CLINIC | Age: 53
End: 2018-02-14

## 2018-02-14 ENCOUNTER — HOSPITAL ENCOUNTER (OUTPATIENT)
Dept: PREADMISSION TESTING | Age: 53
Discharge: HOME | End: 2018-02-14
Payer: MEDICARE

## 2018-02-14 VITALS
RESPIRATION RATE: 18 BRPM | OXYGEN SATURATION: 96 % | BODY MASS INDEX: 30.04 KG/M2 | WEIGHT: 191.4 LBS | SYSTOLIC BLOOD PRESSURE: 112 MMHG | HEIGHT: 67 IN | DIASTOLIC BLOOD PRESSURE: 80 MMHG | HEART RATE: 76 BPM

## 2018-02-14 VITALS
RESPIRATION RATE: 20 BRPM | SYSTOLIC BLOOD PRESSURE: 131 MMHG | OXYGEN SATURATION: 96 % | DIASTOLIC BLOOD PRESSURE: 82 MMHG | TEMPERATURE: 97.4 F | WEIGHT: 192 LBS | HEIGHT: 67 IN | HEART RATE: 89 BPM | BODY MASS INDEX: 30.13 KG/M2

## 2018-02-14 DIAGNOSIS — Z88.9 MULTIPLE ALLERGIES: ICD-10-CM

## 2018-02-14 DIAGNOSIS — K21.9 GASTROESOPHAGEAL REFLUX DISEASE, ESOPHAGITIS PRESENCE NOT SPECIFIED: Primary | ICD-10-CM

## 2018-02-14 DIAGNOSIS — R51.9 FREQUENT HEADACHES: ICD-10-CM

## 2018-02-14 DIAGNOSIS — R07.89 ATYPICAL CHEST PAIN: ICD-10-CM

## 2018-02-14 DIAGNOSIS — R06.00 DYSPNEA, UNSPECIFIED TYPE: ICD-10-CM

## 2018-02-14 DIAGNOSIS — F17.200 SMOKER: ICD-10-CM

## 2018-02-14 DIAGNOSIS — Z01.810 PRE-OPERATIVE CARDIOVASCULAR EXAMINATION: Primary | ICD-10-CM

## 2018-02-14 DIAGNOSIS — M54.50 ACUTE BILATERAL LOW BACK PAIN WITHOUT SCIATICA: ICD-10-CM

## 2018-02-14 LAB
ABO + RH BLD: NORMAL
ALBUMIN SERPL-MCNC: 4 G/DL (ref 3.5–5)
ALBUMIN/GLOB SERPL: 1.3 {RATIO} (ref 1.1–2.2)
ALP SERPL-CCNC: 102 U/L (ref 45–117)
ALT SERPL-CCNC: 31 U/L (ref 12–78)
ANION GAP SERPL CALC-SCNC: 9 MMOL/L (ref 5–15)
APPEARANCE UR: CLEAR
AST SERPL-CCNC: 21 U/L (ref 15–37)
BACTERIA URNS QL MICRO: NEGATIVE /HPF
BASOPHILS # BLD: 0.1 K/UL (ref 0–0.1)
BASOPHILS NFR BLD: 0 % (ref 0–1)
BILIRUB SERPL-MCNC: 0.9 MG/DL (ref 0.2–1)
BILIRUB UR QL: NEGATIVE
BLOOD GROUP ANTIBODIES SERPL: NORMAL
BUN SERPL-MCNC: 11 MG/DL (ref 6–20)
BUN/CREAT SERPL: 11 (ref 12–20)
CALCIUM SERPL-MCNC: 9.9 MG/DL (ref 8.5–10.1)
CHLORIDE SERPL-SCNC: 102 MMOL/L (ref 97–108)
CO2 SERPL-SCNC: 28 MMOL/L (ref 21–32)
COLOR UR: NORMAL
CREAT SERPL-MCNC: 1.02 MG/DL (ref 0.7–1.3)
CRP SERPL-MCNC: 0.8 MG/DL
DIFFERENTIAL METHOD BLD: ABNORMAL
EOSINOPHIL # BLD: 0.2 K/UL (ref 0–0.4)
EOSINOPHIL NFR BLD: 1 % (ref 0–7)
EPITH CASTS URNS QL MICRO: NORMAL /LPF
ERYTHROCYTE [DISTWIDTH] IN BLOOD BY AUTOMATED COUNT: 15.9 % (ref 11.5–14.5)
ERYTHROCYTE [SEDIMENTATION RATE] IN BLOOD: 2 MM/HR (ref 0–20)
EST. AVERAGE GLUCOSE BLD GHB EST-MCNC: 114 MG/DL
GLOBULIN SER CALC-MCNC: 3 G/DL (ref 2–4)
GLUCOSE SERPL-MCNC: 82 MG/DL (ref 65–100)
GLUCOSE UR STRIP.AUTO-MCNC: NEGATIVE MG/DL
HBA1C MFR BLD: 5.6 % (ref 4.2–6.3)
HCT VFR BLD AUTO: 47.9 % (ref 36.6–50.3)
HGB BLD-MCNC: 15.9 G/DL (ref 12.1–17)
HGB UR QL STRIP: NEGATIVE
HYALINE CASTS URNS QL MICRO: NORMAL /LPF (ref 0–5)
IMM GRANULOCYTES # BLD: 0.1 K/UL (ref 0–0.04)
IMM GRANULOCYTES NFR BLD AUTO: 0 % (ref 0–0.5)
KETONES UR QL STRIP.AUTO: NEGATIVE MG/DL
LEUKOCYTE ESTERASE UR QL STRIP.AUTO: NEGATIVE
LYMPHOCYTES # BLD: 2.7 K/UL (ref 0.8–3.5)
LYMPHOCYTES NFR BLD: 16 % (ref 12–49)
MCH RBC QN AUTO: 29.7 PG (ref 26–34)
MCHC RBC AUTO-ENTMCNC: 33.2 G/DL (ref 30–36.5)
MCV RBC AUTO: 89.4 FL (ref 80–99)
MONOCYTES # BLD: 1.3 K/UL (ref 0–1)
MONOCYTES NFR BLD: 8 % (ref 5–13)
NEUTS SEG # BLD: 12.6 K/UL (ref 1.8–8)
NEUTS SEG NFR BLD: 74 % (ref 32–75)
NITRITE UR QL STRIP.AUTO: NEGATIVE
NRBC # BLD: 0 K/UL (ref 0–0.01)
NRBC BLD-RTO: 0 PER 100 WBC
PH UR STRIP: 5 [PH] (ref 5–8)
PLATELET # BLD AUTO: 336 K/UL (ref 150–400)
PMV BLD AUTO: 12.3 FL (ref 8.9–12.9)
POTASSIUM SERPL-SCNC: 5 MMOL/L (ref 3.5–5.1)
PROT SERPL-MCNC: 7 G/DL (ref 6.4–8.2)
PROT UR STRIP-MCNC: NEGATIVE MG/DL
RBC # BLD AUTO: 5.36 M/UL (ref 4.1–5.7)
RBC #/AREA URNS HPF: NORMAL /HPF (ref 0–5)
SODIUM SERPL-SCNC: 139 MMOL/L (ref 136–145)
SP GR UR REFRACTOMETRY: 1.02 (ref 1–1.03)
SPECIMEN EXP DATE BLD: NORMAL
UA: UC IF INDICATED,UAUC: NORMAL
UROBILINOGEN UR QL STRIP.AUTO: 0.2 EU/DL (ref 0.2–1)
WBC # BLD AUTO: 17 K/UL (ref 4.1–11.1)
WBC URNS QL MICRO: NORMAL /HPF (ref 0–4)

## 2018-02-14 PROCEDURE — 86140 C-REACTIVE PROTEIN: CPT | Performed by: ORTHOPAEDIC SURGERY

## 2018-02-14 PROCEDURE — 83036 HEMOGLOBIN GLYCOSYLATED A1C: CPT | Performed by: ORTHOPAEDIC SURGERY

## 2018-02-14 PROCEDURE — 81001 URINALYSIS AUTO W/SCOPE: CPT | Performed by: ORTHOPAEDIC SURGERY

## 2018-02-14 PROCEDURE — 36415 COLL VENOUS BLD VENIPUNCTURE: CPT | Performed by: ORTHOPAEDIC SURGERY

## 2018-02-14 PROCEDURE — 85652 RBC SED RATE AUTOMATED: CPT | Performed by: ORTHOPAEDIC SURGERY

## 2018-02-14 PROCEDURE — 86900 BLOOD TYPING SEROLOGIC ABO: CPT | Performed by: ORTHOPAEDIC SURGERY

## 2018-02-14 PROCEDURE — 85025 COMPLETE CBC W/AUTO DIFF WBC: CPT | Performed by: ORTHOPAEDIC SURGERY

## 2018-02-14 PROCEDURE — 80053 COMPREHEN METABOLIC PANEL: CPT | Performed by: ORTHOPAEDIC SURGERY

## 2018-02-14 PROCEDURE — 84466 ASSAY OF TRANSFERRIN: CPT | Performed by: ORTHOPAEDIC SURGERY

## 2018-02-14 RX ORDER — PANTOPRAZOLE SODIUM 40 MG/1
40 TABLET, DELAYED RELEASE ORAL 2 TIMES DAILY
Qty: 180 TAB | Refills: 1 | Status: SHIPPED | OUTPATIENT
Start: 2018-02-14

## 2018-02-14 RX ORDER — EPINEPHRINE 0.3 MG/.3ML
INJECTION SUBCUTANEOUS
Qty: 2 SYRINGE | Refills: 1 | Status: SHIPPED | OUTPATIENT
Start: 2018-02-14 | End: 2018-07-10 | Stop reason: SDUPTHER

## 2018-02-14 RX ORDER — TOPIRAMATE 25 MG/1
TABLET ORAL
Qty: 270 TAB | Refills: 1 | Status: SHIPPED | OUTPATIENT
Start: 2018-02-14

## 2018-02-14 RX ORDER — DICLOFENAC SODIUM 75 MG/1
75 TABLET, DELAYED RELEASE ORAL 2 TIMES DAILY
Qty: 60 TAB | Refills: 0 | Status: SHIPPED | OUTPATIENT
Start: 2018-02-14 | End: 2018-03-14 | Stop reason: SDUPTHER

## 2018-02-14 NOTE — MR AVS SNAPSHOT
[address] [facility] 600 [address]7 [facility] 
[phone] Patient: Vi Pedro MRN: [id number] Visit Information Date & Time Provider Department Dept. Phone Encounter #  
 2018  3:00 PM Zachariah De Paz MD [facility] [place] [phone] [id number] Your Appointments 2018  8:00 AM  
Any with Dodie Sanon NP  [facility] ([facility]) Appt Note: headache [place]  [address] [facility] [id number] [phone]  
  
   
 [place]  [address] [id number] [address] Upcoming Health Maintenance Date Due FOBT Q 1 YEAR AGE 50-75 2013 MEDICARE YEARLY EXAM 2018 DTaP/Tdap/Td series (2 - Td) 2027 Allergies as of 2018  Review Complete On: 2018 By: Jessika Sanchez Severity Noted Reaction Type Reactions Codeine High 2017    Anaphylaxis Venom-honey Bee High 2015    Anaphylaxis Methadone  2015    Rash Neurontin [Gabapentin]  2015    Vertigo Penicillins  2015    Rash Pt. States he takes Keflex with no difficulties or adverse reactions Trilafon  2015    Other (comments) Tardive dyskinesia Current Immunizations  Never Reviewed Name Date Tdap 2017 Not reviewed this visit You Were Diagnosed With   
  
 Codes Comments Pre-operative cardiovascular examination    -  Primary ICD-10-CM: Z01.810 ICD-9-CM: V72.81 Atypical chest pain     ICD-10-CM: R07.89 ICD-9-CM: 786.59 Dyspnea, unspecified type     ICD-10-CM: R06.00 
ICD-9-CM: 786.09 Smoker     ICD-10-CM: X64.422 ICD-9-CM: 305.1 Vitals BP Pulse Resp Height(growth percentile) Weight(growth percentile) SpO2  
 112/80 (BP 1 Location: Left arm) 76 18 5' 7\" (1.702 m) 191 lb 6.4 oz (86.8 kg) 96% BMI Smoking Status 29.98 kg/m2 Current Every Day Smoker Vitals History BMI and BSA Data Body Mass Index Body Surface Area  
 29.98 kg/m 2 2.03 m 2 Preferred Pharmacy Pharmacy Name Phone [facility] [phone] Your Updated Medication List  
  
   
This list is accurate as of: 2/14/18  3:26 PM.  Always use your most recent med list.  
  
  
  
  
 albuterol 90 mcg/actuation inhaler Commonly known as:  VENTOLIN HFA  
TAKE 1 PUFF BY INHALATION EVERY FOUR (4) HOURS AS NEEDED FOR WHEEZING. cyclobenzaprine 10 mg tablet Commonly known as:  FLEXERIL Take 1 Tab by mouth three (3) times daily as needed for Muscle Spasm(s). diclofenac EC 75 mg EC tablet Commonly known as:  VOLTAREN Take 1 Tab by mouth two (2) times a day. Indications: OSTEOARTHRITIS  
  
 EPINEPHrine 0.3 mg/0.3 mL injection Commonly known as:  Kalen Rumble INJECT 0.3 ML INTRAMUSCULARLY ONCE AS NEEDED FOR UP TO ONE DOSE  
  
 fluticasone-vilanterol 200-25 mcg/dose inhaler Commonly known as:  BREO ELLIPTA Take 1 Puff by inhalation daily. furosemide 20 mg tablet Commonly known as:  LASIX Take 1 Tab by mouth daily. pantoprazole 40 mg tablet Commonly known as:  PROTONIX Take 1 Tab by mouth two (2) times a day. SUMAtriptan 100 mg tablet Commonly known as:  IMITREX Take 1 Tab by mouth once as needed for Migraine for up to 1 dose. topiramate 25 mg tablet Commonly known as:  TOPAMAX Take 3 tablets by mouth nightly We Performed the Following AMB POC EKG ROUTINE W/ 12 LEADS, INTER & REP [22262 CPT(R)] Introducing [facility]! Dear Josey Scott: 
Thank you for requesting a HubPages account. Our records indicate that you already have an active HubPages account. You can access your account anytime at [email or web address] Did you know that you can access your hospital and ER discharge instructions at any time in AutoAlert? You can also review all of your test results from your hospital stay or ER visit. Additional Information If you have questions, please visit the Frequently Asked Questions section of the AutoAlert website at [email or web address] Remember, AutoAlert is NOT to be used for urgent needs. For medical emergencies, dial 911. Now available from your iPhone and Android! Please provide this summary of care documentation to your next provider. Your primary care clinician is listed as Sarika Carmona. If you have any questions after today's visit, please call [phone].
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
## 2018-02-14 NOTE — PERIOP NOTES
[facility]  PREOPERATIVE INSTRUCTIONS    Surgery Date:   Monday 2/19/18   Surgery arrival time given by surgeon: NO  (If [facility] staff will call you between 3pm - 7pm the day before surgery with your arrival time. If your surgery is on a Monday, we will call you the preceding Friday. Please call [phone] after 7pm if you did not receive your arrival time.) Phone call verification on  Friday 2/16/18. 1. Report  to the 2nd [address] on the day of your surgery. Bring your insurance card, photo identification, and any copayment (if applicable). 2. You must have a responsible adult to drive you home and stay with you the first 24 hours after surgery if you are going home the same day of your surgery. 3. Nothing to eat or drink after midnight the night before surgery. This means NO water, gum, mints, coffee, juice, etc.    4. MEDICATIONS TO TAKE THE MORNING OF SURGERY WITH A SIP OF WATER:Albuterol, Breo, Protonix  5. No alcoholic beverages 24 hours before and after your surgery. 6. If you are being admitted to the hospital,please leave personal belongings/luggage in your car until you have an assigned hospital room number. ( The hospital discharge time is 12 PM NOON. Your adult  should be at the hospital prior to the noon discharge time unless otherwise instructed.)   7. STOP Aspirin and/or any non-steroidal anti-inflammatory drugs (i.e. Ibuprofen, Naproxen, Advil, Aleve) as directed by your surgeon. You may take Tylenol. Stop herbal supplements 1 week prior to  surgery. 8. If you are currently taking Plavix, Coumadin,or any other blood-thinning/ anticoagulant medication contact your surgeon for instructions. 9. Wear comfortable clothes. Wear your glasses instead of contacts. Please leave all money, jewelry and valuables at home. No make up, particularly mascara, the day of surgery. 10.  REMOVE ALL body piercings, rings,and jewelry and leave at home.   Wear your hair loose or down, no pony-tails, buns, or any metal hair clips. 11. If you shower the morning of surgery, please do not apply any lotions, powders, or deodorants afterwards. Do not shave any body area within 24 hours of your surgery. 12. Please follow all instructions to avoid any potential surgical cancellation. 13. Should your physical condition change, (i.e. fever, cold, flu, etc.) please notify your surgeon as soon as possible. 14. It is important to be on time. If a situation occurs where you may be delayed, please call:  [phone] / [phone] on the day of surgery. 15. The Preadmission Testing staff can be reached at 21 [phone]. 16.  Special Instructions:  Use Chlorhexidine Care Fusion wash and sponges 3 days prior to surgery as instructed. Incentive spirometer given with instructions to practice at home and bring back to the hospital on the day of surgery. [facility] will contact you if your Hemoglobin A1C is greater than 7.5. Ensure/Glucerna  sample, nutritional information, and Ensure/Glucerna coupon given. Pain pamphlet and Call Don't Fall reminder reviewed with patient.  parking is complimentary Monday - Friday 7 am - 5 pm  Bring PTA Medication list day of surgery with the last doses taken documented   Do not bring medication bottles the day of surgery  16. The patient was contacted  in person. He  verbalize  understanding of all instructions does not  need reinforcement.
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
## 2018-02-16 ENCOUNTER — ANESTHESIA EVENT (OUTPATIENT)
Dept: SURGERY | Age: 53
DRG: 470 | End: 2018-02-16
Payer: MEDICARE

## 2018-02-16 LAB
BACTERIA SPEC CULT: NORMAL
BACTERIA SPEC CULT: NORMAL
SERVICE CMNT-IMP: NORMAL
TRANSFERRIN SERPL-MCNC: 231 MG/DL (ref 200–370)

## 2018-02-16 NOTE — PERIOP NOTES
EKG done at patient's cardiologist is interpreted in the [address] note dated 2/14/2018.   DOS: 2/19/2018

## 2018-02-19 ENCOUNTER — ANESTHESIA (OUTPATIENT)
Dept: SURGERY | Age: 53
DRG: 470 | End: 2018-02-19
Payer: MEDICARE

## 2018-02-19 ENCOUNTER — APPOINTMENT (OUTPATIENT)
Dept: GENERAL RADIOLOGY | Age: 53
DRG: 470 | End: 2018-02-19
Attending: PHYSICIAN ASSISTANT
Payer: MEDICARE

## 2018-02-19 ENCOUNTER — HOSPITAL ENCOUNTER (INPATIENT)
Age: 53
LOS: 3 days | Discharge: HOME HEALTH | DRG: 470 | End: 2018-02-22
Attending: ORTHOPAEDIC SURGERY | Admitting: ORTHOPAEDIC SURGERY
Payer: MEDICARE

## 2018-02-19 DIAGNOSIS — M17.12 ARTHRITIS OF KNEE, LEFT: Primary | ICD-10-CM

## 2018-02-19 DIAGNOSIS — M17.12 PRIMARY OSTEOARTHRITIS OF LEFT KNEE: ICD-10-CM

## 2018-02-19 PROCEDURE — 77030020263 HC SOL INJ SOD CL0.9% LFCR 1000ML: Performed by: ORTHOPAEDIC SURGERY

## 2018-02-19 PROCEDURE — 76060000064 HC AMB SURG ANES 2 TO 2.5 HR: Performed by: ORTHOPAEDIC SURGERY

## 2018-02-19 PROCEDURE — 74011250636 HC RX REV CODE- 250/636

## 2018-02-19 PROCEDURE — 74011250637 HC RX REV CODE- 250/637: Performed by: PHYSICIAN ASSISTANT

## 2018-02-19 PROCEDURE — 77030013708 HC HNDPC SUC IRR PULS STRY –B: Performed by: ORTHOPAEDIC SURGERY

## 2018-02-19 PROCEDURE — 76210000036 HC AMBSU PH I REC 1.5 TO 2 HR: Performed by: ORTHOPAEDIC SURGERY

## 2018-02-19 PROCEDURE — C1776 JOINT DEVICE (IMPLANTABLE): HCPCS | Performed by: ORTHOPAEDIC SURGERY

## 2018-02-19 PROCEDURE — 65270000029 HC RM PRIVATE

## 2018-02-19 PROCEDURE — 76030000021 HC AMB SURG 2 TO 2.5 HR INTENSV-TIER 1: Performed by: ORTHOPAEDIC SURGERY

## 2018-02-19 PROCEDURE — 74011000272 HC RX REV CODE- 272: Performed by: ORTHOPAEDIC SURGERY

## 2018-02-19 PROCEDURE — 77030020782 HC GWN BAIR PAWS FLX 3M -B

## 2018-02-19 PROCEDURE — 74011000250 HC RX REV CODE- 250

## 2018-02-19 PROCEDURE — 77030018883 HC BLD SAW SAG4 STRY -B: Performed by: ORTHOPAEDIC SURGERY

## 2018-02-19 PROCEDURE — 77030002933 HC SUT MCRYL J&J -A: Performed by: ORTHOPAEDIC SURGERY

## 2018-02-19 PROCEDURE — 74011000258 HC RX REV CODE- 258: Performed by: ORTHOPAEDIC SURGERY

## 2018-02-19 PROCEDURE — 77010033678 HC OXYGEN DAILY

## 2018-02-19 PROCEDURE — 77030003601 HC NDL NRV BLK BBMI -A

## 2018-02-19 PROCEDURE — 77030007866 HC KT SPN ANES BBMI -B

## 2018-02-19 PROCEDURE — 77030031139 HC SUT VCRL2 J&J -A: Performed by: ORTHOPAEDIC SURGERY

## 2018-02-19 PROCEDURE — 74011250637 HC RX REV CODE- 250/637: Performed by: ANESTHESIOLOGY

## 2018-02-19 PROCEDURE — 77030028224 HC PDNG CST BSNM -A: Performed by: ORTHOPAEDIC SURGERY

## 2018-02-19 PROCEDURE — 77030012935 HC DRSG AQUACEL BMS -B: Performed by: ORTHOPAEDIC SURGERY

## 2018-02-19 PROCEDURE — 77030032490 HC SLV COMPR SCD KNE COVD -B: Performed by: ORTHOPAEDIC SURGERY

## 2018-02-19 PROCEDURE — 77030018836 HC SOL IRR NACL ICUM -A: Performed by: ORTHOPAEDIC SURGERY

## 2018-02-19 PROCEDURE — 97161 PT EVAL LOW COMPLEX 20 MIN: CPT

## 2018-02-19 PROCEDURE — 77030011640 HC PAD GRND REM COVD -A: Performed by: ORTHOPAEDIC SURGERY

## 2018-02-19 PROCEDURE — 74011250636 HC RX REV CODE- 250/636: Performed by: PHYSICIAN ASSISTANT

## 2018-02-19 PROCEDURE — 77030018673: Performed by: ORTHOPAEDIC SURGERY

## 2018-02-19 PROCEDURE — 8E0YXBZ COMPUTER ASSISTED PROCEDURE OF LOWER EXTREMITY: ICD-10-PCS | Performed by: ORTHOPAEDIC SURGERY

## 2018-02-19 PROCEDURE — 74011250636 HC RX REV CODE- 250/636: Performed by: ANESTHESIOLOGY

## 2018-02-19 PROCEDURE — 77030018822 HC SLV COMPR FT COVD -B

## 2018-02-19 PROCEDURE — 73560 X-RAY EXAM OF KNEE 1 OR 2: CPT

## 2018-02-19 PROCEDURE — 77030028907 HC WRP KNEE WO BGS SOLM -B

## 2018-02-19 PROCEDURE — 74011250636 HC RX REV CODE- 250/636: Performed by: ORTHOPAEDIC SURGERY

## 2018-02-19 PROCEDURE — 77030029820: Performed by: ORTHOPAEDIC SURGERY

## 2018-02-19 PROCEDURE — 0SRD0JA REPLACEMENT OF LEFT KNEE JOINT WITH SYNTHETIC SUBSTITUTE, UNCEMENTED, OPEN APPROACH: ICD-10-PCS | Performed by: ORTHOPAEDIC SURGERY

## 2018-02-19 PROCEDURE — 97116 GAIT TRAINING THERAPY: CPT

## 2018-02-19 PROCEDURE — 74011000250 HC RX REV CODE- 250: Performed by: ORTHOPAEDIC SURGERY

## 2018-02-19 PROCEDURE — 64445 NJX AA&/STRD SCIATIC NRV IMG: CPT

## 2018-02-19 PROCEDURE — C1713 ANCHOR/SCREW BN/BN,TIS/BN: HCPCS | Performed by: ORTHOPAEDIC SURGERY

## 2018-02-19 PROCEDURE — 77030033067 HC SUT PDO STRATFX SPIR J&J -B: Performed by: ORTHOPAEDIC SURGERY

## 2018-02-19 PROCEDURE — 77030038149 HC BLD SAW SAG STRY -D: Performed by: ORTHOPAEDIC SURGERY

## 2018-02-19 PROCEDURE — 77030000032 HC CUF TRNQT ZIMM -B: Performed by: ORTHOPAEDIC SURGERY

## 2018-02-19 PROCEDURE — 77030029828 HC FEM TIB CKPNT KT DISP STRY -B: Performed by: ORTHOPAEDIC SURGERY

## 2018-02-19 PROCEDURE — 77030010507 HC ADH SKN DERMBND J&J -B: Performed by: ORTHOPAEDIC SURGERY

## 2018-02-19 PROCEDURE — 64447 NJX AA&/STRD FEMORAL NRV IMG: CPT

## 2018-02-19 DEVICE — TIBIAL COMPONENT
Type: IMPLANTABLE DEVICE | Site: KNEE | Status: FUNCTIONAL
Brand: TRIATHLON

## 2018-02-19 DEVICE — TIBIAL BEARING INSERT - CR
Type: IMPLANTABLE DEVICE | Site: KNEE | Status: FUNCTIONAL
Brand: TRIATHLON

## 2018-02-19 DEVICE — COMPONENT TOT KNEE HYBRID POROUS X3 TRIATHLON: Type: IMPLANTABLE DEVICE | Status: FUNCTIONAL

## 2018-02-19 DEVICE — CRUCIATE RETAINING FEMORAL
Type: IMPLANTABLE DEVICE | Site: KNEE | Status: FUNCTIONAL
Brand: TRIATHLON

## 2018-02-19 DEVICE — PATELLA
Type: IMPLANTABLE DEVICE | Site: KNEE | Status: FUNCTIONAL
Brand: TRIATHLON

## 2018-02-19 RX ORDER — SODIUM CHLORIDE 9 MG/ML
125 INJECTION, SOLUTION INTRAVENOUS CONTINUOUS
Status: DISPENSED | OUTPATIENT
Start: 2018-02-19 | End: 2018-02-20

## 2018-02-19 RX ORDER — LIDOCAINE HYDROCHLORIDE 10 MG/ML
0.1 INJECTION, SOLUTION EPIDURAL; INFILTRATION; INTRACAUDAL; PERINEURAL AS NEEDED
Status: DISCONTINUED | OUTPATIENT
Start: 2018-02-19 | End: 2018-02-19 | Stop reason: HOSPADM

## 2018-02-19 RX ORDER — FENTANYL CITRATE 50 UG/ML
INJECTION, SOLUTION INTRAMUSCULAR; INTRAVENOUS AS NEEDED
Status: DISCONTINUED | OUTPATIENT
Start: 2018-02-19 | End: 2018-02-19 | Stop reason: HOSPADM

## 2018-02-19 RX ORDER — DIPHENHYDRAMINE HYDROCHLORIDE 50 MG/ML
12.5 INJECTION, SOLUTION INTRAMUSCULAR; INTRAVENOUS AS NEEDED
Status: DISCONTINUED | OUTPATIENT
Start: 2018-02-19 | End: 2018-02-19 | Stop reason: HOSPADM

## 2018-02-19 RX ORDER — TRAMADOL HYDROCHLORIDE 50 MG/1
50 TABLET ORAL
Qty: 60 TAB | Refills: 0 | Status: SHIPPED | OUTPATIENT
Start: 2018-02-19 | End: 2018-06-05

## 2018-02-19 RX ORDER — ONDANSETRON 2 MG/ML
4 INJECTION INTRAMUSCULAR; INTRAVENOUS
Status: DISPENSED | OUTPATIENT
Start: 2018-02-19 | End: 2018-02-20

## 2018-02-19 RX ORDER — SUMATRIPTAN 25 MG/1
100 TABLET, FILM COATED ORAL
Status: DISCONTINUED | OUTPATIENT
Start: 2018-02-19 | End: 2018-02-21

## 2018-02-19 RX ORDER — CYCLOBENZAPRINE HCL 10 MG
10 TABLET ORAL
Status: DISCONTINUED | OUTPATIENT
Start: 2018-02-19 | End: 2018-02-22 | Stop reason: HOSPADM

## 2018-02-19 RX ORDER — NALOXONE HYDROCHLORIDE 0.4 MG/ML
0.2 INJECTION, SOLUTION INTRAMUSCULAR; INTRAVENOUS; SUBCUTANEOUS
Status: DISCONTINUED | OUTPATIENT
Start: 2018-02-19 | End: 2018-02-19 | Stop reason: HOSPADM

## 2018-02-19 RX ORDER — SODIUM CHLORIDE, SODIUM LACTATE, POTASSIUM CHLORIDE, CALCIUM CHLORIDE 600; 310; 30; 20 MG/100ML; MG/100ML; MG/100ML; MG/100ML
125 INJECTION, SOLUTION INTRAVENOUS CONTINUOUS
Status: DISCONTINUED | OUTPATIENT
Start: 2018-02-19 | End: 2018-02-19 | Stop reason: HOSPADM

## 2018-02-19 RX ORDER — MIDAZOLAM HYDROCHLORIDE 1 MG/ML
2 INJECTION, SOLUTION INTRAMUSCULAR; INTRAVENOUS
Status: DISCONTINUED | OUTPATIENT
Start: 2018-02-19 | End: 2018-02-19 | Stop reason: HOSPADM

## 2018-02-19 RX ORDER — ONDANSETRON 2 MG/ML
INJECTION INTRAMUSCULAR; INTRAVENOUS AS NEEDED
Status: DISCONTINUED | OUTPATIENT
Start: 2018-02-19 | End: 2018-02-19 | Stop reason: HOSPADM

## 2018-02-19 RX ORDER — HYDROMORPHONE HYDROCHLORIDE 2 MG/1
4 TABLET ORAL
Status: DISCONTINUED | OUTPATIENT
Start: 2018-02-19 | End: 2018-02-20

## 2018-02-19 RX ORDER — OXYCODONE HYDROCHLORIDE 5 MG/1
10 TABLET ORAL
Status: DISCONTINUED | OUTPATIENT
Start: 2018-02-19 | End: 2018-02-19 | Stop reason: HOSPADM

## 2018-02-19 RX ORDER — FLUMAZENIL 0.1 MG/ML
0.2 INJECTION INTRAVENOUS
Status: DISCONTINUED | OUTPATIENT
Start: 2018-02-19 | End: 2018-02-19 | Stop reason: HOSPADM

## 2018-02-19 RX ORDER — TOPIRAMATE 25 MG/1
75 TABLET ORAL
Status: DISCONTINUED | OUTPATIENT
Start: 2018-02-19 | End: 2018-02-22 | Stop reason: HOSPADM

## 2018-02-19 RX ORDER — NALOXONE HYDROCHLORIDE 0.4 MG/ML
0.4 INJECTION, SOLUTION INTRAMUSCULAR; INTRAVENOUS; SUBCUTANEOUS AS NEEDED
Status: DISCONTINUED | OUTPATIENT
Start: 2018-02-19 | End: 2018-02-22 | Stop reason: HOSPADM

## 2018-02-19 RX ORDER — POLYETHYLENE GLYCOL 3350 17 G/17G
17 POWDER, FOR SOLUTION ORAL DAILY
Status: DISCONTINUED | OUTPATIENT
Start: 2018-02-20 | End: 2018-02-22 | Stop reason: HOSPADM

## 2018-02-19 RX ORDER — SODIUM CHLORIDE 0.9 % (FLUSH) 0.9 %
5-10 SYRINGE (ML) INJECTION AS NEEDED
Status: DISCONTINUED | OUTPATIENT
Start: 2018-02-19 | End: 2018-02-22 | Stop reason: HOSPADM

## 2018-02-19 RX ORDER — ACETAMINOPHEN 325 MG/1
975 TABLET ORAL ONCE
Status: COMPLETED | OUTPATIENT
Start: 2018-02-19 | End: 2018-02-19

## 2018-02-19 RX ORDER — FAMOTIDINE 20 MG/1
20 TABLET, FILM COATED ORAL 2 TIMES DAILY
Status: DISCONTINUED | OUTPATIENT
Start: 2018-02-19 | End: 2018-02-22 | Stop reason: HOSPADM

## 2018-02-19 RX ORDER — ALBUTEROL SULFATE 0.83 MG/ML
2.5 SOLUTION RESPIRATORY (INHALATION)
Status: DISCONTINUED | OUTPATIENT
Start: 2018-02-19 | End: 2018-02-22 | Stop reason: HOSPADM

## 2018-02-19 RX ORDER — ASPIRIN 325 MG
325 TABLET, DELAYED RELEASE (ENTERIC COATED) ORAL 2 TIMES DAILY
Status: DISCONTINUED | OUTPATIENT
Start: 2018-02-19 | End: 2018-02-22 | Stop reason: HOSPADM

## 2018-02-19 RX ORDER — EPINEPHRINE 1 MG/ML
0.3 INJECTION, SOLUTION, CONCENTRATE INTRAVENOUS
Status: DISCONTINUED | OUTPATIENT
Start: 2018-02-19 | End: 2018-02-22 | Stop reason: HOSPADM

## 2018-02-19 RX ORDER — FACIAL-BODY WIPES
10 EACH TOPICAL DAILY PRN
Status: DISCONTINUED | OUTPATIENT
Start: 2018-02-21 | End: 2018-02-22 | Stop reason: HOSPADM

## 2018-02-19 RX ORDER — PROPOFOL 10 MG/ML
INJECTION, EMULSION INTRAVENOUS AS NEEDED
Status: DISCONTINUED | OUTPATIENT
Start: 2018-02-19 | End: 2018-02-19 | Stop reason: HOSPADM

## 2018-02-19 RX ORDER — ROPIVACAINE HYDROCHLORIDE 2 MG/ML
INJECTION, SOLUTION EPIDURAL; INFILTRATION; PERINEURAL AS NEEDED
Status: DISCONTINUED | OUTPATIENT
Start: 2018-02-19 | End: 2018-02-19 | Stop reason: HOSPADM

## 2018-02-19 RX ORDER — FUROSEMIDE 20 MG/1
20 TABLET ORAL DAILY
Status: DISCONTINUED | OUTPATIENT
Start: 2018-02-20 | End: 2018-02-22 | Stop reason: HOSPADM

## 2018-02-19 RX ORDER — CEFAZOLIN SODIUM/WATER 2 G/20 ML
2 SYRINGE (ML) INTRAVENOUS ONCE
Status: COMPLETED | OUTPATIENT
Start: 2018-02-19 | End: 2018-02-19

## 2018-02-19 RX ORDER — PROPOFOL 10 MG/ML
INJECTION, EMULSION INTRAVENOUS
Status: DISCONTINUED | OUTPATIENT
Start: 2018-02-19 | End: 2018-02-19 | Stop reason: HOSPADM

## 2018-02-19 RX ORDER — POVIDONE-IODINE 10 %
SOLUTION, NON-ORAL TOPICAL AS NEEDED
Status: DISCONTINUED | OUTPATIENT
Start: 2018-02-19 | End: 2018-02-19 | Stop reason: HOSPADM

## 2018-02-19 RX ORDER — DIPHENHYDRAMINE HYDROCHLORIDE 50 MG/ML
INJECTION, SOLUTION INTRAMUSCULAR; INTRAVENOUS AS NEEDED
Status: DISCONTINUED | OUTPATIENT
Start: 2018-02-19 | End: 2018-02-19 | Stop reason: HOSPADM

## 2018-02-19 RX ORDER — OXYCODONE HCL 20 MG/1
20 TABLET, FILM COATED, EXTENDED RELEASE ORAL ONCE
Status: COMPLETED | OUTPATIENT
Start: 2018-02-19 | End: 2018-02-19

## 2018-02-19 RX ORDER — BUPIVACAINE HYDROCHLORIDE 7.5 MG/ML
INJECTION, SOLUTION EPIDURAL; RETROBULBAR AS NEEDED
Status: DISCONTINUED | OUTPATIENT
Start: 2018-02-19 | End: 2018-02-19 | Stop reason: HOSPADM

## 2018-02-19 RX ORDER — HYDROMORPHONE HYDROCHLORIDE 1 MG/ML
0.5 INJECTION, SOLUTION INTRAMUSCULAR; INTRAVENOUS; SUBCUTANEOUS
Status: DISPENSED | OUTPATIENT
Start: 2018-02-19 | End: 2018-02-20

## 2018-02-19 RX ORDER — HYDROMORPHONE HYDROCHLORIDE 2 MG/ML
.25-1 INJECTION, SOLUTION INTRAMUSCULAR; INTRAVENOUS; SUBCUTANEOUS
Status: DISCONTINUED | OUTPATIENT
Start: 2018-02-19 | End: 2018-02-19 | Stop reason: HOSPADM

## 2018-02-19 RX ORDER — IBUPROFEN 200 MG
1 TABLET ORAL DAILY
Status: DISCONTINUED | OUTPATIENT
Start: 2018-02-19 | End: 2018-02-22 | Stop reason: HOSPADM

## 2018-02-19 RX ORDER — OXYCODONE HYDROCHLORIDE 5 MG/1
5-10 TABLET ORAL
Qty: 70 TAB | Refills: 0 | Status: SHIPPED | OUTPATIENT
Start: 2018-02-19 | End: 2018-02-22

## 2018-02-19 RX ORDER — ARFORMOTEROL TARTRATE 15 UG/2ML
15 SOLUTION RESPIRATORY (INHALATION)
Status: DISCONTINUED | OUTPATIENT
Start: 2018-02-19 | End: 2018-02-22 | Stop reason: HOSPADM

## 2018-02-19 RX ORDER — PANTOPRAZOLE SODIUM 40 MG/1
40 TABLET, DELAYED RELEASE ORAL 2 TIMES DAILY
Status: DISCONTINUED | OUTPATIENT
Start: 2018-02-19 | End: 2018-02-22 | Stop reason: HOSPADM

## 2018-02-19 RX ORDER — KETOROLAC TROMETHAMINE 30 MG/ML
30 INJECTION, SOLUTION INTRAMUSCULAR; INTRAVENOUS EVERY 6 HOURS
Status: COMPLETED | OUTPATIENT
Start: 2018-02-19 | End: 2018-02-20

## 2018-02-19 RX ORDER — IBUPROFEN 200 MG
1 TABLET ORAL DAILY
Status: DISCONTINUED | OUTPATIENT
Start: 2018-02-20 | End: 2018-02-19

## 2018-02-19 RX ORDER — CELECOXIB 100 MG/1
200 CAPSULE ORAL 2 TIMES DAILY
Status: DISCONTINUED | OUTPATIENT
Start: 2018-02-20 | End: 2018-02-22 | Stop reason: HOSPADM

## 2018-02-19 RX ORDER — FENTANYL CITRATE 50 UG/ML
25 INJECTION, SOLUTION INTRAMUSCULAR; INTRAVENOUS
Status: DISCONTINUED | OUTPATIENT
Start: 2018-02-19 | End: 2018-02-19 | Stop reason: HOSPADM

## 2018-02-19 RX ORDER — ONDANSETRON 8 MG/1
4 TABLET, ORALLY DISINTEGRATING ORAL
Qty: 30 TAB | Refills: 0 | Status: SHIPPED | OUTPATIENT
Start: 2018-02-19 | End: 2018-06-05

## 2018-02-19 RX ORDER — ACETAMINOPHEN 325 MG/1
650 TABLET ORAL EVERY 6 HOURS
Status: DISCONTINUED | OUTPATIENT
Start: 2018-02-19 | End: 2018-02-22 | Stop reason: HOSPADM

## 2018-02-19 RX ORDER — SODIUM CHLORIDE 0.9 % (FLUSH) 0.9 %
5-10 SYRINGE (ML) INJECTION EVERY 8 HOURS
Status: DISCONTINUED | OUTPATIENT
Start: 2018-02-20 | End: 2018-02-22 | Stop reason: HOSPADM

## 2018-02-19 RX ORDER — BUDESONIDE 0.5 MG/2ML
500 INHALANT ORAL
Status: DISCONTINUED | OUTPATIENT
Start: 2018-02-19 | End: 2018-02-22 | Stop reason: HOSPADM

## 2018-02-19 RX ORDER — DIPHENHYDRAMINE HYDROCHLORIDE 50 MG/ML
12.5 INJECTION, SOLUTION INTRAMUSCULAR; INTRAVENOUS
Status: DISPENSED | OUTPATIENT
Start: 2018-02-19 | End: 2018-02-20

## 2018-02-19 RX ORDER — ACETAMINOPHEN 500 MG
500-1000 TABLET ORAL
Qty: 60 TAB | Refills: 0 | Status: SHIPPED | OUTPATIENT
Start: 2018-02-19 | End: 2018-06-05

## 2018-02-19 RX ORDER — ASPIRIN 325 MG
325 TABLET, DELAYED RELEASE (ENTERIC COATED) ORAL 2 TIMES DAILY
Qty: 60 TAB | Refills: 0 | Status: SHIPPED | OUTPATIENT
Start: 2018-02-19 | End: 2018-06-05

## 2018-02-19 RX ORDER — MIDAZOLAM HYDROCHLORIDE 1 MG/ML
INJECTION, SOLUTION INTRAMUSCULAR; INTRAVENOUS AS NEEDED
Status: DISCONTINUED | OUTPATIENT
Start: 2018-02-19 | End: 2018-02-19 | Stop reason: HOSPADM

## 2018-02-19 RX ORDER — AMOXICILLIN 250 MG
1 CAPSULE ORAL 2 TIMES DAILY
Status: DISCONTINUED | OUTPATIENT
Start: 2018-02-19 | End: 2018-02-22 | Stop reason: HOSPADM

## 2018-02-19 RX ORDER — HYDROMORPHONE HYDROCHLORIDE 2 MG/1
2 TABLET ORAL
Status: DISCONTINUED | OUTPATIENT
Start: 2018-02-19 | End: 2018-02-20

## 2018-02-19 RX ORDER — CEFAZOLIN SODIUM/WATER 2 G/20 ML
2 SYRINGE (ML) INTRAVENOUS EVERY 8 HOURS
Status: COMPLETED | OUTPATIENT
Start: 2018-02-19 | End: 2018-02-20

## 2018-02-19 RX ADMIN — KETOROLAC TROMETHAMINE 30 MG: 30 INJECTION, SOLUTION INTRAMUSCULAR at 17:04

## 2018-02-19 RX ADMIN — TOPIRAMATE 75 MG: 25 TABLET, FILM COATED ORAL at 20:29

## 2018-02-19 RX ADMIN — KETOROLAC TROMETHAMINE 30 MG: 30 INJECTION, SOLUTION INTRAMUSCULAR at 22:44

## 2018-02-19 RX ADMIN — Medication 2 G: at 17:04

## 2018-02-19 RX ADMIN — Medication 2 G: at 10:24

## 2018-02-19 RX ADMIN — ASPIRIN 325 MG: 325 TABLET, DELAYED RELEASE ORAL at 17:04

## 2018-02-19 RX ADMIN — FAMOTIDINE 20 MG: 20 TABLET, FILM COATED ORAL at 17:04

## 2018-02-19 RX ADMIN — FENTANYL CITRATE 50 MCG: 50 INJECTION, SOLUTION INTRAMUSCULAR; INTRAVENOUS at 09:40

## 2018-02-19 RX ADMIN — PANTOPRAZOLE SODIUM 40 MG: 40 TABLET, DELAYED RELEASE ORAL at 17:05

## 2018-02-19 RX ADMIN — FENTANYL CITRATE 50 MCG: 50 INJECTION, SOLUTION INTRAMUSCULAR; INTRAVENOUS at 09:42

## 2018-02-19 RX ADMIN — SODIUM CHLORIDE, POTASSIUM CHLORIDE, SODIUM LACTATE AND CALCIUM CHLORIDE: 600; 310; 30; 20 INJECTION, SOLUTION INTRAVENOUS at 11:25

## 2018-02-19 RX ADMIN — MIDAZOLAM HYDROCHLORIDE 1 MG: 1 INJECTION, SOLUTION INTRAMUSCULAR; INTRAVENOUS at 09:41

## 2018-02-19 RX ADMIN — ACETAMINOPHEN 650 MG: 325 TABLET ORAL at 17:04

## 2018-02-19 RX ADMIN — ROPIVACAINE HYDROCHLORIDE 30 ML: 2 INJECTION, SOLUTION EPIDURAL; INFILTRATION; PERINEURAL at 09:43

## 2018-02-19 RX ADMIN — PROPOFOL 100 MCG/KG/MIN: 10 INJECTION, EMULSION INTRAVENOUS at 10:14

## 2018-02-19 RX ADMIN — BUPIVACAINE HYDROCHLORIDE 2.6 ML: 7.5 INJECTION, SOLUTION EPIDURAL; RETROBULBAR at 09:58

## 2018-02-19 RX ADMIN — FENTANYL CITRATE 50 MCG: 50 INJECTION, SOLUTION INTRAMUSCULAR; INTRAVENOUS at 09:43

## 2018-02-19 RX ADMIN — DOCUSATE SODIUM AND SENNOSIDES 1 TABLET: 8.6; 5 TABLET, FILM COATED ORAL at 17:04

## 2018-02-19 RX ADMIN — ONDANSETRON 4 MG: 2 INJECTION INTRAMUSCULAR; INTRAVENOUS at 10:28

## 2018-02-19 RX ADMIN — DIPHENHYDRAMINE HYDROCHLORIDE 12.5 MG: 50 INJECTION, SOLUTION INTRAMUSCULAR; INTRAVENOUS at 13:14

## 2018-02-19 RX ADMIN — HYDROMORPHONE HYDROCHLORIDE 0.5 MG: 1 INJECTION, SOLUTION INTRAMUSCULAR; INTRAVENOUS; SUBCUTANEOUS at 20:28

## 2018-02-19 RX ADMIN — HYDROMORPHONE HYDROCHLORIDE 2 MG: 2 TABLET ORAL at 15:34

## 2018-02-19 RX ADMIN — MIDAZOLAM HYDROCHLORIDE 1 MG: 1 INJECTION, SOLUTION INTRAMUSCULAR; INTRAVENOUS at 09:52

## 2018-02-19 RX ADMIN — Medication 10 ML: at 20:29

## 2018-02-19 RX ADMIN — OXYCODONE HYDROCHLORIDE 20 MG: 20 TABLET, FILM COATED, EXTENDED RELEASE ORAL at 09:04

## 2018-02-19 RX ADMIN — ONDANSETRON 4 MG: 2 INJECTION INTRAMUSCULAR; INTRAVENOUS at 20:36

## 2018-02-19 RX ADMIN — SODIUM CHLORIDE 125 ML/HR: 9 INJECTION, SOLUTION INTRAVENOUS at 13:10

## 2018-02-19 RX ADMIN — PROPOFOL 20 MG: 10 INJECTION, EMULSION INTRAVENOUS at 10:15

## 2018-02-19 RX ADMIN — ROPIVACAINE HYDROCHLORIDE 20 ML: 2 INJECTION, SOLUTION EPIDURAL; INFILTRATION; PERINEURAL at 09:48

## 2018-02-19 RX ADMIN — ACETAMINOPHEN 975 MG: 325 TABLET ORAL at 09:03

## 2018-02-19 RX ADMIN — DIPHENHYDRAMINE HYDROCHLORIDE 12.5 MG: 50 INJECTION, SOLUTION INTRAMUSCULAR; INTRAVENOUS at 21:03

## 2018-02-19 RX ADMIN — FENTANYL CITRATE 50 MCG: 50 INJECTION, SOLUTION INTRAMUSCULAR; INTRAVENOUS at 09:37

## 2018-02-19 RX ADMIN — ACETAMINOPHEN 650 MG: 325 TABLET ORAL at 22:44

## 2018-02-19 RX ADMIN — SODIUM CHLORIDE, POTASSIUM CHLORIDE, SODIUM LACTATE AND CALCIUM CHLORIDE: 600; 310; 30; 20 INJECTION, SOLUTION INTRAVENOUS at 10:35

## 2018-02-19 RX ADMIN — SODIUM CHLORIDE, POTASSIUM CHLORIDE, SODIUM LACTATE AND CALCIUM CHLORIDE: 600; 310; 30; 20 INJECTION, SOLUTION INTRAVENOUS at 08:59

## 2018-02-19 RX ADMIN — DIPHENHYDRAMINE HYDROCHLORIDE 12.5 MG: 50 INJECTION, SOLUTION INTRAMUSCULAR; INTRAVENOUS at 10:17

## 2018-02-19 RX ADMIN — MIDAZOLAM HYDROCHLORIDE 3 MG: 1 INJECTION, SOLUTION INTRAMUSCULAR; INTRAVENOUS at 09:37

## 2018-02-19 RX ADMIN — FENTANYL CITRATE 50 MCG: 50 INJECTION, SOLUTION INTRAMUSCULAR; INTRAVENOUS at 09:52

## 2018-02-19 RX ADMIN — HYDROMORPHONE HYDROCHLORIDE 4 MG: 2 TABLET ORAL at 19:20

## 2018-02-19 NOTE — ANESTHESIA PROCEDURE NOTES
Peripheral Block    Start time: 2/19/2018 9:36 AM  End time: 2/19/2018 9:48 AM  Performed by: Adama Corrales  Authorized by: Noemy Olea       Pre-procedure: Indications: at surgeon's request and post-op pain management    Preanesthetic Checklist: patient identified, risks and benefits discussed, site marked, timeout performed, anesthesia consent given and patient being monitored    Timeout Time: 09:36          Block Type:   Block Type:  Popliteal and femoral single shot  Laterality:  Left  Monitoring:  Continuous pulse ox, frequent vital sign checks, heart rate and responsive to questions  Injection Technique:  Single shot  Procedures: ultrasound guided and nerve stimulator    Patient Position: supine  Prep: chlorhexidine    Location:  Upper thigh  Needle Type:  Stimuplex  Needle Gauge:  22 G  Needle Localization:  Nerve stimulator and ultrasound guidance  Medication Injected:  0.2%  ropivacaine  Volume (mL):  30    Assessment:  Number of attempts:  1  Injection Assessment:  Incremental injection every 5 mL, local visualized surrounding nerve on ultrasound, negative aspiration for blood, no paresthesia and no intravascular symptoms  Patient tolerance:  Patient tolerated the procedure well with no immediate complications  Popliteal block done under ultrasound guidance and nerve stimulation with incremental injection of Ropivacaine 0.2% 20 cc using a 21 G Stimuplex needle.

## 2018-02-19 NOTE — IP AVS SNAPSHOT
Rosy Zamora 
 
 
 [address] 
[phone] Patient: Joaquin Villalpando MRN: [id number] :1965 A check eve indicates which time of day the medication should be taken. My Medications START taking these medications Instructions Each Dose to Equal  
 Morning Noon Evening Bedtime  
 acetaminophen 500 mg tablet Commonly known as:  [address] Your last dose was:  18 at 12:25 Take 1-2 Tabs by mouth every six (6) hours as needed for Pain. Not to exceed 4,000mg in any 24 hour period  Indications: Pain 500-1000 mg  
    
   
   
   
  
 aspirin delayed-release 325 mg tablet Your last dose was:  Given in AM  
Your next dose is:  Due in PM  
   
 Take 1 Tab by mouth two (2) times a day. 325 mg  
    
   
   
  
   
  
 ondansetron 8 mg disintegrating tablet Commonly known as:  ZOFRAN ODT Take 0.5 Tabs by mouth every eight (8) hours as needed for Nausea. 4 mg  
    
   
   
   
  
 oxyCODONE IR 10 mg Tab immediate release tablet Commonly known as:  Zee Stuart Your last dose was:  Given at 12:50 today Take 1-1.5 Tabs by mouth every three (3) hours as needed. Max Daily Amount: 120 mg.  
 10-15 mg  
    
   
   
   
  
 traMADol 50 mg tablet Commonly known as:  ULTRAM  
   
 Take 1 Tab by mouth every six (6) hours as needed for Pain (Take for breakthrough pain if Oxycodone is not working). Max Daily Amount: 200 mg. Indications: Pain, Post-op Pain, Diagnosis Hip and Knee Arthritis ICD 10 - M16.9  
 50 mg CONTINUE taking these medications Instructions Each Dose to Equal  
 Morning Noon Evening Bedtime  
 albuterol 90 mcg/actuation inhaler Commonly known as:  VENTOLIN HFA  
   
 TAKE 1 PUFF BY INHALATION EVERY FOUR (4) HOURS AS NEEDED FOR WHEEZING. cyclobenzaprine 10 mg tablet Commonly known as:  FLEXERIL  
 Your last dose was:  Given 2/21/18 in AM  
   
 Take 1 Tab by mouth three (3) times daily as needed for Muscle Spasm(s). 10 mg  
    
   
   
   
  
 diclofenac EC 75 mg EC tablet Commonly known as:  VOLTAREN Take 1 Tab by mouth two (2) times a day. Indications: OSTEOARTHRITIS 75 mg  
    
   
   
   
  
 EPINEPHrine 0.3 mg/0.3 mL injection Commonly known as:  Shahid Barth INJECT 0.3 ML INTRAMUSCULARLY ONCE AS NEEDED FOR UP TO ONE DOSE  
     
   
   
   
  
 fluticasone-vilanterol 200-25 mcg/dose inhaler Commonly known as:  BREO ELLIPTA Take 1 Puff by inhalation daily. 1 Puff  
    
   
   
   
  
 furosemide 20 mg tablet Commonly known as:  LASIX Your last dose was:  Given 2/21/18  In AM  
   
 Take 1 Tab by mouth daily. 20 mg  
    
   
   
   
  
 pantoprazole 40 mg tablet Commonly known as:  PROTONIX Your last dose was:  Given this morning Take 1 Tab by mouth two (2) times a day. 40 mg  
    
   
   
   
  
 SUMAtriptan 100 mg tablet Commonly known as:  IMITREX Your last dose was:  2/21/18 at 1:40PM  
   
 Take 1 Tab by mouth once as needed for Migraine for up to 1 dose. 100 mg  
    
   
   
   
  
 topiramate 25 mg tablet Commonly known as:  TOPAMAX Your last dose was:  2/21/18 10:00 PM  
   
 Take 3 tablets by mouth nightly ASK your doctor about these medications Instructions Each Dose to Equal  
 Morning Noon Evening Bedtime  
 rizatriptan 10 mg disintegrating tablet Commonly known as:  MAXALT-MLT  
   
 1 at HA onset and repeat in 2 hours if needed. Max 2 in 24 hours Where to Get Your Medications These medications were sent to [address] [place] [address], [address] [place] [address]. [address] Phone:  [phone]  
  rizatriptan 10 mg disintegrating tablet Information on where to get these meds will be given to you by the nurse or doctor. ! Ask your nurse or doctor about these medications  
  acetaminophen 500 mg tablet  
 aspirin delayed-release 325 mg tablet  
 ondansetron 8 mg disintegrating tablet  
 oxyCODONE IR 10 mg Tab immediate release tablet  
 traMADol 50 mg tablet

## 2018-02-19 NOTE — PROGRESS NOTES
Problem: Patient Education: Go to Patient Education Activity  Goal: Patient/Family Education  Outcome: Progressing Towards Goal  The patient attended the pre-operative joint replacement class. The content of the class, using a power-point presentation as well as visual demonstration was specific for patients undergoing total knee and total hip replacements. A pre-operative education booklet was provided to all patients. At the conclusion of class an opportunity was offered for any question or concerns the patient or family may have regarding their upcoming scheduled procedure. Patient attended class on 2/14/18, no  present. Pt for juan procedure.

## 2018-02-19 NOTE — ANESTHESIA POSTPROCEDURE EVALUATION
Post-Anesthesia Evaluation and Assessment    Patient: Rahel Chambers MRN: [id number]  SSN: [id number]    YOB: 1965  Age: 45 y.o. Sex: male       Cardiovascular Function/Vital Signs  Visit Vitals    /90    Pulse 72    Temp 36.5 °C (97.7 °F)    Resp 11    Ht 5' 7\" (1.702 m)    Wt 86.6 kg (190 lb 14.7 oz)    SpO2 96%    BMI 29.9 kg/m2       Patient is status post spinal, regional anesthesia for Procedure(s):  LEFT TOTAL KNEE REPLACEMENT MAKOPLASTY. Nausea/Vomiting: None    Postoperative hydration reviewed and adequate. Pain:  Pain Scale 1: Numeric (0 - 10) (02/19/18 1310)  Pain Intensity 1: 0 (02/19/18 1310)   Managed    Neurological Status:   Neuro (WDL): Exceptions to WDL (02/19/18 1230)  Neuro  Neurologic State: Alert (02/19/18 1230)  LUE Motor Response: Purposeful (02/19/18 1230)  LLE Motor Response: Pharmacologically paralyzed (02/19/18 1230)  RUE Motor Response: Purposeful (02/19/18 1230)  RLE Motor Response: Pharmacologically paralyzed (02/19/18 1230)   At baseline    Mental Status and Level of Consciousness: Arousable    Pulmonary Status:   O2 Device: Nasal cannula (02/19/18 1227)   Adequate oxygenation and airway patent    Complications related to anesthesia: None    Post-anesthesia assessment completed.  No concerns    Signed By: Olegario Ledesma MD     February 19, 2018

## 2018-02-19 NOTE — PROGRESS NOTES
Assigned changed. Penny Mahoney no longer assuming care. Bedside and Verbal shift change report given to Mame Ochoa RN (oncoming nurse) by Elma Trevino RN (offgoing nurse). Report included the following information SBAR, Kardex, Procedure Summary, Intake/Output, MAR and Recent Results.
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
## 2018-02-19 NOTE — OP NOTES
OPERATIVE REPORT     Admit Date: 2/19/2018  Admit Diagnosis: DJD LEFT KNEE   Primary osteoarthritis of left knee    Date of Procedure: 2/19/2018   Preoperative Diagnosis: DJD LEFT KNEE   Postoperative Diagnosis: DJD LEFT KNEE     Procedure: Procedure(s) with comments:  LEFT TOTAL KNEE REPLACEMENT MAKOPLASTY - LEFT TOTAL KNEE REPLACEMENT MAKOPLASTY  Surgeon: Dwayne Noguera MD  Assistant(s): Jud Reed PA-C  Anesthesia: Regional   Estimated Blood Loss: 150cc  Specimens: * No specimens in log *   Complications: None           INDICATIONS:   The patient is a 45 y.o., male who has complained of a long history of knee pain. The patient  has failed conservative treatment and presents for definitive operative care. Informed consent obtained including a discussion of the risks and benefits, which include, but are not limited to, bleeding, infection, neurovascular damage, wound complications, pain and stiffness in the knee, periprosthetic loosening, fracture dislocation and DVT, the patient consented for the procedure. DESCRIPTION OF PROCEDURE:   The patient was seen in the preoperative holding area. The patient was positively identified. The limb was initialed,  questions were answered. The patient was subsequently taken to the operating room. The patient underwent   general endotracheal anesthesia. The patient was positioned in the supine   position. All bony prominences were well padded. The limb was prepped and   draped in a sterile fashion. The appropriate pause for safety was performed. Steinmen pins were placed for the femoral array (one hand breadth proximal to the superior pole of the patella) and tibial array (one hand breadth distal to the tibial tubercle). The hip center or rotation was established. Utilizing an incision from above the superior pole of the patella distally to the tibial tubercle.  The incision was taken down through the skin and subcutaneous tissue until the retinaculum could be identified. This was sharply incised utilizing a medial parapatellar incision. This was taken   Down. The medial-based soft tissue was retracted as well as the infrapatellar fat pad. A standard exposure was performed. The femoral and tibial trackers were then placed. Registration was performed on the femur and tibia. The knee was balanced in both flexion and extension with force applied. The final implants and position was verified. Once this was complete the MAKOplasty robot was positioned. Standard cuts were made for the tibia first.     Once this was complete, tibial tensioner's were placed. The knee was tensioned in both flexion and extension and the final femoral component position and rotation was established. The remaining femoral cuts were made with the robot. Trial implants were placed and range of motion along with overall fit was established with very good integrity of the MCL noted. Final bony osteophytes were removed and the meniscal rim was removed. The knee was injected with 60cc of Morphine / Ketoralac and Lidocaine. The knee was then exsanguinated and the trial implants were removed. Subsequently, all the bony surfaces were irrigated. The femur was placed first, then the tibia. The final poly ethylene was placed. The knee was brought into extension and the patellar cut was performed and the button placed. The knee was very stable after it was taken through a full range of motion. The wound was closed with 0 Vicryl and then number 2 Quill proximally. Once this had been completed, the skin was closed with 2-0 Vicryl 4-0 monocryl suture and Dermabond. The tibial and femoral incisions were closed with 4-0 Monocryl. A sterile dressing was applied. The patient was taken from the operating room in stable condition. OPERATIVE FINDINGS : Medial OA of the knee. IMPLANTS :     Implant Name Type Inv.  Item Serial No.  Lot No. LRB No. Used Action   INSERT TIB CR TRIATHLN X3 6 11 --  - SNA Other INSERT TIB CR TRIATHLN X3 6 11 --  NA [employer] [id number] Left 1 Implanted   BASEPLT TIB PC TRITNM SZ 6 -- TRIATHLON - SNA Other BASEPLT TIB PC TRITNM SZ 6 -- TRIATHLON NA [employer] [id number] Left 1 Implanted   COMPNT FEM CR TRIATHLN 5 L PA --  - SNA Other COMPNT FEM CR TRIATHLN 5 L PA --  NA [employer] [id number] Left 1 Implanted   PATELLA  ASYMETRIC SZ A32MM 10MM - SNA Joint Component PATELLA  ASYMETRIC SZ A32MM 10MM NA [employer] [id number] Left 1 Implanted       JUSTIFICATION FOR SURGICAL ASSISTANT:   Surgical Assistant, was requried and necessary in this case, to help with soft tissue retraction, extremity positioning, equiment management, implant management, and wound closure.     Daniel Desir MD

## 2018-02-19 NOTE — PROGRESS NOTES
Primary Nurse Marie Jang RN and Darwin Michael RN performed a dual skin assessment on this patient. No impairment noted beyond surgical incision. Jose Guadalupe score is 20. Bedside and Verbal shift change report given to Darwin Michael RN (oncoming nurse) by Mane Rodriguez RN (offgoing nurse). Report included the following information SBAR, Kardex, Procedure Summary, Intake/Output, MAR and Recent Results.

## 2018-02-19 NOTE — PERIOP NOTES
TRANSFER - OUT REPORT:    Verbal report given to rosey rn(name) on [employer]  being transferred to [facility](unit) for routine post - op       Report consisted of patients Situation, Background, Assessment and   Recommendations(SBAR). Information from the following report(s) SBAR was reviewed with the receiving nurse. Lines:   Peripheral IV 02/19/18 Right Hand (Active)   Site Assessment Clean, dry, & intact 2/19/2018  2:29 PM   Phlebitis Assessment 0 2/19/2018  2:29 PM   Infiltration Assessment 0 2/19/2018  2:29 PM   Dressing Status Clean, dry, & intact 2/19/2018  2:29 PM   Dressing Type Transparent;Tape 2/19/2018  2:29 PM   Hub Color/Line Status Pink; Infusing 2/19/2018  2:29 PM   Alcohol Cap Used Yes 2/19/2018  2:29 PM        Opportunity for questions and clarification was provided.       Patient transported with:   O2 @ 0 liters  Registered Nurse

## 2018-02-19 NOTE — ANESTHESIA PROCEDURE NOTES
Spinal Block    Start time: 2/19/2018 9:48 AM  End time: 2/19/2018 9:58 AM  Performed by: Javier Guidry  Authorized by: Pratibha Barragan     Pre-procedure: Indications: at surgeon's request and primary anesthetic  Preanesthetic Checklist: patient identified, risks and benefits discussed, anesthesia consent, site marked, patient being monitored and timeout performed    Timeout Time: 09:48          Spinal Block:   Patient Position:  Seated  Prep Region:  Lumbar  Prep: Betadine      Location:  L3-4  Technique:  Single shot  Local:  Lidocaine 1%  Local Dose (mL):  1    Needle:   Needle type: Antonio.   Needle Gauge:  24 G  Attempts:  1      Events: CSF confirmed, no blood with aspiration and no paresthesia        Assessment:  Insertion:  Uncomplicated  Patient tolerance:  Patient tolerated the procedure well with no immediate complications

## 2018-02-19 NOTE — IP AVS SNAPSHOT
[facility] 
 
 
 [address] 
[phone] Patient: Abhinav Lynn MRN: [id number] :1965 About your hospitalization You were admitted on:  2018 You last received care in the:  SF 4M POST SURG ORT 2 You were discharged on:  2018 Why you were hospitalized Your primary diagnosis was:  Not on File Your diagnoses also included:  Primary Osteoarthritis Of Left Knee Follow-up Information Follow up With Details Comments Contact Info Yousif Polanco NP   [address] [place] [address] [address] [place] [address] 
[phone] Discharge Orders None A check eve indicates which time of day the medication should be taken. My Medications START taking these medications Instructions Each Dose to Equal  
 Morning Noon Evening Bedtime  
 acetaminophen 500 mg tablet Commonly known as:  Jr Alondra Mesa  Your last dose was:  18 at 12:25 Take 1-2 Tabs by mouth every six (6) hours as needed for Pain. Not to exceed 4,000mg in any 24 hour period  Indications: Pain 500-1000 mg  
    
   
   
   
  
 aspirin delayed-release 325 mg tablet Your last dose was:  Given in AM  
Your next dose is:  Due in PM  
   
 Take 1 Tab by mouth two (2) times a day. 325 mg  
    
   
   
  
   
  
 ondansetron 8 mg disintegrating tablet Commonly known as:  ZOFRAN ODT Take 0.5 Tabs by mouth every eight (8) hours as needed for Nausea. 4 mg  
    
   
   
   
  
 oxyCODONE IR 10 mg Tab immediate release tablet Commonly known as:  Jas Mckee Your last dose was:  Given at 12:50 today Take 1-1.5 Tabs by mouth every three (3) hours as needed. Max Daily Amount: 120 mg.  
 10-15 mg  
    
   
   
   
  
 traMADol 50 mg tablet Commonly known as:  ULTRAM  
   
 Take 1 Tab by mouth every six (6) hours as needed for Pain (Take for breakthrough pain if Oxycodone is not working). Max Daily Amount: 200 mg. Indications: Pain, Post-op Pain, Diagnosis Hip and Knee Arthritis ICD 10 - M16.9  
 50 mg CONTINUE taking these medications Instructions Each Dose to Equal  
 Morning Noon Evening Bedtime  
 albuterol 90 mcg/actuation inhaler Commonly known as:  VENTOLIN HFA  
   
 TAKE 1 PUFF BY INHALATION EVERY FOUR (4) HOURS AS NEEDED FOR WHEEZING. cyclobenzaprine 10 mg tablet Commonly known as:  FLEXERIL Your last dose was:  Given 2/21/18 in AM  
   
 Take 1 Tab by mouth three (3) times daily as needed for Muscle Spasm(s). 10 mg  
    
   
   
   
  
 diclofenac EC 75 mg EC tablet Commonly known as:  VOLTAREN Take 1 Tab by mouth two (2) times a day. Indications: OSTEOARTHRITIS 75 mg  
    
   
   
   
  
 EPINEPHrine 0.3 mg/0.3 mL injection Commonly known as:  Shahid Bill INJECT 0.3 ML INTRAMUSCULARLY ONCE AS NEEDED FOR UP TO ONE DOSE  
     
   
   
   
  
 fluticasone-vilanterol 200-25 mcg/dose inhaler Commonly known as:  BREO ELLIPTA Take 1 Puff by inhalation daily. 1 Puff  
    
   
   
   
  
 furosemide 20 mg tablet Commonly known as:  LASIX Your last dose was:  Given 2/21/18  In AM  
   
 Take 1 Tab by mouth daily. 20 mg  
    
   
   
   
  
 pantoprazole 40 mg tablet Commonly known as:  PROTONIX Your last dose was:  Given this morning Take 1 Tab by mouth two (2) times a day. 40 mg  
    
   
   
   
  
 SUMAtriptan 100 mg tablet Commonly known as:  IMITREX Your last dose was:  2/21/18 at 1:40PM  
   
 Take 1 Tab by mouth once as needed for Migraine for up to 1 dose. 100 mg  
    
   
   
   
  
 topiramate 25 mg tablet Commonly known as:  TOPAMAX Your last dose was:  2/21/18 10:00 PM  
   
 Take 3 tablets by mouth nightly ASK your doctor about these medications Instructions Each Dose to Equal  
 Morning Noon Evening Bedtime  
 rizatriptan 10 mg disintegrating tablet Commonly known as:  MAXALT-MLT  
   
 1 at HA onset and repeat in 2 hours if needed. Max 2 in 24 hours Where to Get Your Medications These medications were sent to [facility], [facility] [address] [place] [address]  18 [facility] [address] Phone:  [phone]  
  rizatriptan 10 mg disintegrating tablet Information on where to get these meds will be given to you by the nurse or doctor. ! Ask your nurse or doctor about these medications  
  acetaminophen 500 mg tablet  
 aspirin delayed-release 325 mg tablet  
 ondansetron 8 mg disintegrating tablet  
 oxyCODONE IR 10 mg Tab immediate release tablet  
 traMADol 50 mg tablet Discharge Instructions TOTAL KNEE DISCHARGE INSTRUCTIONS Patient: Darrell Pena MRN: [id number]  SSN: [id number] Please take the time to review the following instructions before you leave the hospital and use them as guidelines during your recovery from surgery. If you have any questions you may contact my office at [phone]  After business hours or during the weekend you can contact me at [phone] (cell phone) for emergency's. Please use the office number during regular business hours. SPECIAL INSTRUCTIONS :  
1. Full extension at the knee is the most important aspect of your range of motion. Avoid placing a pillow or bump behind the knee. Rather, place the heel up on a bump or pillow and allow gravity to help straighten the knee. 2. You may weight bear as tolerated on the knee and during the day you should bend the knee as much as possible. 3. Drainage from the incision more than 4 days from surgery is concerning. Contact my office if there is any question [phone]. Wound Care/ Dressing Changes: DRESSING :  
 
 Aquacel Dressing : This may be removed by home health 7 days after the date of your surgery. If there is no drainage, then a simple dressing may be used or no dressing at all. Other dressing options can be purchased over the counter at a local pharmacy or medical supply vendor. A porous adhesive dressing such as pictured above can be purchased at a local [facility] or [employer]. You only need to keep the incision covered for 7 days after showers. A dressing may be used for longer if there are issues with clothing clinging to the incision. Showering/ Bathing: You may shower with the aquacel dressing in place. This is left in place for 7 days following discharge from the hospital. If your incision is dry without drainage you may shower following your discharge home. After 7 days your aquacel dressing should be removed for showering. It is fine to have water run over the incision. Do not vigorously scrub your incision. Apply a clean, dry dressing after you have dried your incision. Do not take a bath or get into a swimming pool / [place] until you follow up with Dr. Yunior Sheikh. Do not soak your incision under water. If there is continued drainage or you are concerned contact Dr Haydee Fleming office prior to showering [phone]. Diet: 
You may advance to your regular diet as tolerated. Increase your clear liquid intake for the next 2-3 days. Nutrition Recommendations for Discharge: 
 
Continue Oral Nutrition Supplements at discharge: Ensure Enlive or Ensure Plus 1-2 times per day  
for 30 days unless otherwise directed by your Primary Care Physician. This product can be purchased at your local grocery store or drug store and online. Jam Holguin, RD 
 _ Medication: 
 
 
1. You will be given prescriptions for pain medication when you are discharged from the hospital. The side effects of these medications can be substantial and the narcotic medications are not mandatory.  You may substitute these medications with Tylenol or Alleve / Motrin. 2.  Please use the medications as prescribed. Pain medications may cause constipation- Colace twice daily and Miralax one scoop daily while taking the narcotic medication should help prevent constipation. Please discuss with your local pharmacist regarding increasing this dosage if constipation persists. Other possible side effects of pain medication are dizziness, headache, nausea, vomiting, and urinary retention. Discontinue the pain medication if you develop itching, rash, shortness of breath, or difficulties swallowing. If these symptoms become severe or are not relieved by discontinuing the medication, you should seek immediate medical attention. 3. Refills of pain medication are authorized during office hours only (8 AM- 5 PM  Monday thru Friday). Many of these medication will require you or a family member to pick-up a physical prescription at the office. 4. Medications other than antiinflammatories will not be called into the pharmacy after business hours. 5. Do not take Tylenol/Acetaminophen in addition to your pain medication as most pain medications already contain this ingredient. Do not exceed 4000mg of Tylenol/Acetaminophen per day. 6. You may resume the medication(s) you were taking prior to your surgery. Narcotics may change the effects of some antidepressant medication(s). If you have any questions about possible interactions between your regular medications and the pain medication, you should ask the pharmacist or contact the prescribing physician. 7. You will be discharged with prescriptions for additional pain medications (Tramadol or Toradol) and a medication for nausea and vomiting (Phenergan). You only need to fill these prescriptions if the primary pain medication is not working or you experiencing post-op nausea.   
8. If you have constipation which is not improved by oral stool softeners then a Ducolax suppository should be purchased over the counter. 9. Continue the blood thinner (Aspirin or Lovenox) for a total of 30 days following surgery. Follow up appointment: 
 
Please call our office at [phone] for your follow up appointment. This should be scheduled 14 days following the date of surgery. Physical Therapy / Nursing: 
 
Physical Therapy following surgery will be arranged at home along with at home nursing care. They have specific instructions for rehab and wound care. .  
 
Returning to work: 
 
Normal return to work is 3-12 weeks following total knee replacement. Depending on your progression following surgery and specific job duties you may take longer for a full return to work. DRIVING You should not return to driving until you are off all narcotic pain medications and able to safely and quickly apply the brakes. This is normally 3-4 weeks for left knees and 4-6 weeks for right knees. Important Signs and Symptoms: 
 
If any of the following signs or symptoms occur, you should contact Dr. Dominick Cha office. Please be advised if a problem arises which you feel requires immediate medical attention or you are unable to contact Dr. Dominick Cha office you should seek immediate medical attention at the ER or other health care facility you have access to. 
 
1. A sudden increase in swelling and/or redness or warmth at the area your surgery was performed which isnt relieved by rest, ice, and elevation. 2. Oral temperature greater than 101 degrees for 12 hours or more which isnt relieved by an increase in fluid intake and taking 2 Tylenol every 4-6 hours. 3. Excessive drainage from your incisions, or drainage which hasnt stopped by 72 hours after your surgery. 4. Fever, chills, shortness of breath, chest pain, nausea, vomiting or other signs and symptoms which are of concern to you. frequently asked questions ? What should I take for pain? o In general you will be discharged with three medications for pain (Extra Strength Tylenol, Oxycodone 5mg and Tramadol). This may vary slightly depending on what you were taking in the hospital.  
? 1st Line  Extra Strength Tylenol 1-2 tablets (500-1000mg) every 4-6 hrs. 
? After 2 days this dose should not exceed 8 tablets per day ? This is the first and only medication you need to take. Initially you may need 2 tablets every 4 hours, but as your pain subsides, this will taper to 1 tablet every 6 hours. ? 2nd Line - Tramadol 50mg (1 tablet) every 8 hours ? 3rd Line  Oxycodone 1 (5mg) - 2 (10mg) every 4 hours (Or as directed), take these between Percocet doses if your pain is not below 4 / 10. This may be needed only for several days following your discharge. ? 4th Line  Add Alleve 220mg every 12 hours or Motrin 400mg (200mg x 2) every 8 hours ? When should I call for advice regarding my pain? 
o After 12 hours on the above regimen, if nothing is working call the office [phone] or [phone]) or call my cell after hours [phone]. 
? Can I get refills? 
o Narcotic refills are provided for the first 6 weeks following surgery. ? I will generally try to taper down to a single narcotic medication by your two week appointment. o Try Tylenol 650mg along with Alleve 220mg or Motrin 200mg during the majority of the day. ? Save the narcotic pain medications for physical therapy (1 hour prior) and before sleeping at night. ? Keep in mind you need to discontinue these medications prior to returning to driving. ? Is swelling normal? 
o Almost everyone has some degree of swelling following surgery. o Following hip and knee replacement surgery, swelling can be normal below the incision for the first 1-2 weeks. ? This swelling peaks around 5-7 days after surgery. ? You may have some bruising around the back of the thigh, calf and even into the foot. ? What should I do for the swelling? o Keep the limb elevated. o Apply compression socks (knee high for total knees and up to the mid-thigh for total hips.  
o Heat or ice may be applied, choose the modality that makes you the most comfortable. ? How long should I remain on blood thinners following surgery? 
o Thirty days ? When can I drive? 
o Once you have stopped using regular narcotic pain medications (Percocet, Lortab, etc.) and can safely apply the brakes without hesitation. ? When can I shower? o 72hours following surgery if the incision is dry. 
o No submersion of the incision, bathing or swimming for 14 days following surgery or until cleared by Dr Andrzej Valentine. ? What do I do with the dressing when I shower? 
o The dressing can be removed. o The incision is sealed with Dermabond (Biologic glue) and except for wounds which are draining should be watertight. ? How active should I be following surgery? o Progress activities in moderation at your own pace.  
o Walk each day and set progressive goals with small increments (1st week  ½ block of walking, 2nd week  1 block, 3rd week  2 blocks, etc.) Please do not hesitate to call me at [phone] (cell phone) for questions following surgery - MD Neelima Day MD 
Cell [phone] Lauren Moore PA-C Cell [phone] Medical Staff : Dalia Peace Office : [phone] Introducing [facility] [place] [facility]! Dear Quinn Warner: 
Thank you for requesting a [employer] account. Our records indicate that you already have an active [employer] account. You can access your account anytime at [email or web address] Did you know that you can access your hospital and ER discharge instructions at any time in [employer]? You can also review all of your test results from your hospital stay or ER visit. Additional Information If you have questions, please visit the Frequently Asked Questions section of the [employer] website at [email or web address] Remember, [facility] is NOT to be used for urgent needs. For medical emergencies, dial 911. Now available from your iPhone and Android! Providers Seen During Your Hospitalization Provider Specialty Primary office phone Hilary Barrientos MD Orthopedic Surgery [phone] Your Primary Care Physician (PCP) Primary Care Physician Office Phone Office Fax Xavi Valadez, [address] [phone] You are allergic to the following Allergen Reactions Codeine Anaphylaxis Pt has had both hydrocodone and oxycodone before Venom-Honey Bee Anaphylaxis Methadone Rash Neurontin (Gabapentin) Vertigo Penicillins Rash Pt. States he takes Keflex with no difficulties or adverse reactions Trilafon Other (comments) Tardive dyskinesia Recent Documentation Height Weight BMI Smoking Status 1.702 m 86.6 kg 29.9 kg/m2 Current Every Day Smoker Emergency Contacts Name Discharge Info Relation Home Work Mobile DontaeChandrika page DISCHARGE CAREGIVER [3] Sister [23]   [phone] Anthony Patel DISCHARGE CAREGIVER [3] Friend [5]   [phone] Patient Belongings The following personal items are in your possession at time of discharge: 
  Dental Appliances: Lowers, Uppers  Visual Aid: Glasses, With patient          Jewelry: None  Clothing: Pants, Shirt, Footwear, Undergarments    Other Valuables: None  Personal Items Sent to Safe: n/a Please provide this summary of care documentation to your next provider. Signatures-by signing, you are acknowledging that this After Visit Summary has been reviewed with you and you have received a copy. Patient Signature:  ____________________________________________________________  Date:  ____________________________________________________________  
  
Socorrorosa Jay    
    
 Provider Signature:  ____________________________________________________________ Date:  ____________________________________________________________

## 2018-02-20 ENCOUNTER — TELEPHONE (OUTPATIENT)
Dept: FAMILY MEDICINE CLINIC | Age: 53
End: 2018-02-20

## 2018-02-20 DIAGNOSIS — M17.12 PRIMARY OSTEOARTHRITIS OF LEFT KNEE: Primary | ICD-10-CM

## 2018-02-20 LAB
ANION GAP SERPL CALC-SCNC: 4 MMOL/L (ref 5–15)
BUN SERPL-MCNC: 14 MG/DL (ref 6–20)
BUN/CREAT SERPL: 16 (ref 12–20)
CALCIUM SERPL-MCNC: 8.7 MG/DL (ref 8.5–10.1)
CHLORIDE SERPL-SCNC: 109 MMOL/L (ref 97–108)
CO2 SERPL-SCNC: 29 MMOL/L (ref 21–32)
CREAT SERPL-MCNC: 0.87 MG/DL (ref 0.7–1.3)
GLUCOSE SERPL-MCNC: 88 MG/DL (ref 65–100)
HGB BLD-MCNC: 12.4 G/DL (ref 12.1–17)
POTASSIUM SERPL-SCNC: 4.4 MMOL/L (ref 3.5–5.1)
SODIUM SERPL-SCNC: 142 MMOL/L (ref 136–145)

## 2018-02-20 PROCEDURE — 74011000250 HC RX REV CODE- 250: Performed by: PHYSICIAN ASSISTANT

## 2018-02-20 PROCEDURE — 36415 COLL VENOUS BLD VENIPUNCTURE: CPT | Performed by: PHYSICIAN ASSISTANT

## 2018-02-20 PROCEDURE — 94640 AIRWAY INHALATION TREATMENT: CPT

## 2018-02-20 PROCEDURE — 74011250637 HC RX REV CODE- 250/637: Performed by: NURSE PRACTITIONER

## 2018-02-20 PROCEDURE — 77030013140 HC MSK NEB VYRM -A

## 2018-02-20 PROCEDURE — 65270000029 HC RM PRIVATE

## 2018-02-20 PROCEDURE — 74011250636 HC RX REV CODE- 250/636: Performed by: PHYSICIAN ASSISTANT

## 2018-02-20 PROCEDURE — 80048 BASIC METABOLIC PNL TOTAL CA: CPT | Performed by: PHYSICIAN ASSISTANT

## 2018-02-20 PROCEDURE — 85018 HEMOGLOBIN: CPT | Performed by: PHYSICIAN ASSISTANT

## 2018-02-20 PROCEDURE — 74011250637 HC RX REV CODE- 250/637: Performed by: PHYSICIAN ASSISTANT

## 2018-02-20 PROCEDURE — 97116 GAIT TRAINING THERAPY: CPT

## 2018-02-20 PROCEDURE — 97530 THERAPEUTIC ACTIVITIES: CPT

## 2018-02-20 RX ORDER — OXYCODONE HYDROCHLORIDE 5 MG/1
5 TABLET ORAL
Status: DISCONTINUED | OUTPATIENT
Start: 2018-02-20 | End: 2018-02-21

## 2018-02-20 RX ORDER — OXYCODONE HYDROCHLORIDE 5 MG/1
10 TABLET ORAL
Status: DISCONTINUED | OUTPATIENT
Start: 2018-02-20 | End: 2018-02-21

## 2018-02-20 RX ADMIN — PANTOPRAZOLE SODIUM 40 MG: 40 TABLET, DELAYED RELEASE ORAL at 09:19

## 2018-02-20 RX ADMIN — Medication 10 ML: at 05:38

## 2018-02-20 RX ADMIN — OXYCODONE HYDROCHLORIDE 10 MG: 5 TABLET ORAL at 13:49

## 2018-02-20 RX ADMIN — KETOROLAC TROMETHAMINE 30 MG: 30 INJECTION, SOLUTION INTRAMUSCULAR at 13:50

## 2018-02-20 RX ADMIN — KETOROLAC TROMETHAMINE 30 MG: 30 INJECTION, SOLUTION INTRAMUSCULAR at 05:37

## 2018-02-20 RX ADMIN — Medication 2 G: at 00:21

## 2018-02-20 RX ADMIN — Medication 10 ML: at 20:50

## 2018-02-20 RX ADMIN — FUROSEMIDE 20 MG: 20 TABLET ORAL at 09:20

## 2018-02-20 RX ADMIN — ASPIRIN 325 MG: 325 TABLET, DELAYED RELEASE ORAL at 17:28

## 2018-02-20 RX ADMIN — OXYCODONE HYDROCHLORIDE 10 MG: 5 TABLET ORAL at 23:45

## 2018-02-20 RX ADMIN — Medication 10 ML: at 10:53

## 2018-02-20 RX ADMIN — HYDROMORPHONE HYDROCHLORIDE 4 MG: 2 TABLET ORAL at 09:20

## 2018-02-20 RX ADMIN — ACETAMINOPHEN 650 MG: 325 TABLET ORAL at 13:49

## 2018-02-20 RX ADMIN — OXYCODONE HYDROCHLORIDE 10 MG: 5 TABLET ORAL at 17:29

## 2018-02-20 RX ADMIN — HYDROMORPHONE HYDROCHLORIDE 4 MG: 2 TABLET ORAL at 05:37

## 2018-02-20 RX ADMIN — ALBUTEROL SULFATE 2.5 MG: 2.5 SOLUTION RESPIRATORY (INHALATION) at 23:52

## 2018-02-20 RX ADMIN — DOCUSATE SODIUM AND SENNOSIDES 1 TABLET: 8.6; 5 TABLET, FILM COATED ORAL at 17:28

## 2018-02-20 RX ADMIN — ACETAMINOPHEN 650 MG: 325 TABLET ORAL at 23:45

## 2018-02-20 RX ADMIN — ACETAMINOPHEN 650 MG: 325 TABLET ORAL at 05:37

## 2018-02-20 RX ADMIN — OXYCODONE HYDROCHLORIDE 10 MG: 5 TABLET ORAL at 20:48

## 2018-02-20 RX ADMIN — ASPIRIN 325 MG: 325 TABLET, DELAYED RELEASE ORAL at 09:18

## 2018-02-20 RX ADMIN — ACETAMINOPHEN 650 MG: 325 TABLET ORAL at 17:28

## 2018-02-20 RX ADMIN — HYDROMORPHONE HYDROCHLORIDE 0.5 MG: 1 INJECTION, SOLUTION INTRAMUSCULAR; INTRAVENOUS; SUBCUTANEOUS at 00:21

## 2018-02-20 RX ADMIN — ALBUTEROL SULFATE 2.5 MG: 2.5 SOLUTION RESPIRATORY (INHALATION) at 19:28

## 2018-02-20 RX ADMIN — FAMOTIDINE 20 MG: 20 TABLET, FILM COATED ORAL at 17:28

## 2018-02-20 RX ADMIN — TOPIRAMATE 75 MG: 25 TABLET, FILM COATED ORAL at 20:48

## 2018-02-20 RX ADMIN — DOCUSATE SODIUM AND SENNOSIDES 1 TABLET: 8.6; 5 TABLET, FILM COATED ORAL at 09:19

## 2018-02-20 RX ADMIN — POLYETHYLENE GLYCOL (3350) 17 G: 17 POWDER, FOR SOLUTION ORAL at 09:20

## 2018-02-20 RX ADMIN — Medication 2 G: at 09:24

## 2018-02-20 RX ADMIN — PANTOPRAZOLE SODIUM 40 MG: 40 TABLET, DELAYED RELEASE ORAL at 17:28

## 2018-02-20 NOTE — PROGRESS NOTES
NUTRITION         Diet: Regular   Body mass index is 29.9 kg/(m^2). Skin: Surgical incision to L knee, 1+ LLE edema  PO Intake:   Good per nursing report    Estimated Daily Nutrition Requirements:  Kcals: 2192-0620 (BMR (1664) x 1.3 AF (-250)                 Protein:   g (1.0-1.2 g/kg x actual BW)                Fluid:  1 mL/kcal     Consult received for general nutrition management & supplements. 45 yom admitted for OA L knee. S/p L TKA, surgical wound to L knee, 1+ LLE noted. Labs/meds reviewed. A1c 5.6% (2/14/18). On lasix, bowel regimen, anti-nausea meds. Diet advanced to Regular. Attempted visit w/ pt this afternoon. Working w/ PT. Noted GI distress overnight. Spoke w/ RN, doing better w/ med adjustment. Tolerated lunch w/ good intake. Multiple med allergies, no food allergies noted. Wt stable x > 6 mo PTA per wt hx. Overwt per age. Will add ONS trial BID (Ensure Enlive) to aid in meeting energy and increased protein needs for wound healing while in the hospital.  Will include ONS recs in D/C plans. Will f/u prn to assess ONS tolerance & provide ortho post-op nutrition recs.      Wt Readings from Last 30 Encounters:   02/19/18 86.6 kg (190 lb 14.7 oz)   02/14/18 86.8 kg (191 lb 6.4 oz)   02/14/18 87.1 kg (192 lb)   02/13/18 87.1 kg (192 lb)   02/07/18 87.1 kg (192 lb)   12/13/17 86.2 kg (190 lb)   11/15/17 87.5 kg (192 lb 12.8 oz)   08/28/17 87.5 kg (193 lb)   07/19/17 87.1 kg (192 lb)   06/15/17 89.4 kg (197 lb)   06/04/17 90.7 kg (200 lb)   05/25/17 90.3 kg (199 lb)   05/10/17 88.2 kg (194 lb 7.1 oz)   05/02/17 90.4 kg (199 lb 6.4 oz)   04/14/17 91.2 kg (201 lb)   03/15/17 92.5 kg (204 lb)   02/27/17 91.2 kg (201 lb)   02/02/17 92.2 kg (203 lb 3.2 oz)   01/30/17 91.6 kg (202 lb)   01/20/17 91.6 kg (202 lb)   01/16/17 88.9 kg (196 lb)   01/13/17 89.4 kg (197 lb)   12/28/16 89.4 kg (197 lb)   12/12/16 87.5 kg (193 lb)   11/21/16 88.4 kg (194 lb 14.2 oz)   11/17/16 88.4 kg (194 lb 14.2 oz) 09/21/16 88 kg (194 lb)   09/12/16 88.5 kg (195 lb)   09/08/16 88 kg (194 lb)   08/23/16 87.1 kg (192 lb)     Nutrition Diagnosis: Increased nutrient needs related to increased need for protein as evidenced by impaired skin integrity - surgical wound to L knee, 1+ LLE edema  Nutrition Intervention: General, healthful diet; commercial supplement; nutrition education  Goal: PO intake of meals, ONS >/= 75% in the next 2-4 days  Monitoring and Evaluation: PO intakes, labs, skin integrity, wt    RD will continue to monitor and will f/u for further nutrition assessment and intervention as appropriate.     Education & Discharge Needs:   [x] Nutrition related discharge needs addressed:    [x] Supplements (on d/c instruction &/or coupons provided)    [x] Education - f/u prn   [x] Participated in care plan, discharge planning, and/or interdisciplinary rounds    []No nutrition related discharge needs at this time     Cultural, Yarsanism and ethnic food preferences identified    [x]None   [] Yes     Imelda Mccullough, [address]   Pager [phone]

## 2018-02-20 NOTE — PROGRESS NOTES
Ortho    Pt states he is highly allergic to codeine but can tolerate oxycodone/Percocet and hydrocodone without issue. He has taken percocet in the recent past without any negative side effects.  Will trial oxycodone 5-10mg q3hr prn.     Jennifer Hart NP

## 2018-02-20 NOTE — TELEPHONE ENCOUNTER
Received refill request for Diclofenac, and I filled. Then, received notification from Nisa Walker that patient is allergic to Diclofenac. Is this correct? Need to know to update chart, and rx. Will you call and check with patient? Thanks!

## 2018-02-20 NOTE — PROGRESS NOTES
Problem: Falls - Risk of  Goal: *Absence of Falls  Document Wallace Fall Risk and appropriate interventions in the flowsheet.    Outcome: Progressing Towards Goal  Fall Risk Interventions:  Mobility Interventions: Patient to call before getting OOB         Medication Interventions: Teach patient to arise slowly, Patient to call before getting OOB    Elimination Interventions: Call light in reach, Patient to call for help with toileting needs

## 2018-02-20 NOTE — PROGRESS NOTES
02/20/18     MSW met with the patient to discuss planning and [facility] and walker consult. Address verified. Patient lives alone in a one story home with 7 steps wand one railing to the deck. He has been independent and driving PTA. He has a cane but needs a walker. He has never had HH. His PCP is Dr. Moo Rincon but he sees the NP., Sarika Carmona. He uses [facility] in [place] [place]. He states he has a neighbor who can assist him somewhat. Patient has [employer] and lives in [place]. TC to PCP to write order for walker. Choice letter signed for any DME and [address] that takes his ins. Waiting to hear from [employer] [facility] [place]  [place] to see if they can accept. [facility] accepted her and [place] approved for walker to get from the closet. Put in patient's room. Care Management Interventions  PCP Verified by CM:  Yes (NP, Dr. Josy Luz)  Transition of Care Consult (CM Consult): [address]: No  [facility]: Out of service area  Physical Therapy Consult: Yes  Occupational Therapy Consult: Yes  Current Support Network: Lives Alone  Plan discussed with Pt/Family/Caregiver: Yes  Freedom of Choice Offered: Yes  Discharge Location  Discharge Placement: Home with home health     CATINA Preston

## 2018-02-20 NOTE — PROGRESS NOTES
Problem: Mobility Impaired (Adult and Pediatric)  Goal: *Acute Goals and Plan of Care (Insert Text)  Physical Therapy Goals  Initiated 2/19/2018    1. Patient will move from supine to sit and sit to supine  in bed with modified independence within 4 days. 2. Patient will perform sit to stand with modified independence within 4 days. 3. Patient will ambulate with modified independence for 150 feet with the least restrictive device within 4 days. 4. Patient will ascend/descend 7 stairs with 1 handrail(s) with modified independence within 4 days. 5. Patient will perform home exercise program per protocol with modified independence within 4 days. 6. Patient will demonstrate AROM 0-90 degrees in operative joint within 4 days. physical Therapy TREATMENT  Patient: Justus Hammonds (31 y.o. male)  Date: 2/20/2018  Diagnosis: DJD LEFT KNEE   Primary osteoarthritis of left knee <principal problem not specified>  Procedure(s) (LRB):  LEFT TOTAL KNEE REPLACEMENT MAKOPLASTY (Left) 1 Day Post-Op  Precautions: WBAT, Fall  Chart, physical therapy assessment, plan of care and goals were reviewed. ASSESSMENT:  Pt received in bed, reports 5/10 pain and increased nausea, denies emesis. Agreeable to participate with physical therapy. CGA for bed mobility>sitting EOB , denies dizziness or increase in nausea. Sit<>stand with CGA x2, verbal Cues for increase WB on LLE in supported standing. Gait training x 20' with CGA x2 Pt requested to return to bed secondary to pain and nausea. Pt expressed concern of pain medications. Pt does not have help at home and will need additional days for safety. Progression toward goals:  []      Improving appropriately and progressing toward goals  [x]      Improving slowly and progressing toward goals  []      Not making progress toward goals and plan of care will be adjusted     PLAN:  Patient continues to benefit from skilled intervention to address the above impairments.   Continue treatment per established plan of care. Discharge Recommendations:  Home Health  Further Equipment Recommendations for Discharge:  none     SUBJECTIVE:   Patient stated I feel lousy.     OBJECTIVE DATA SUMMARY:   Critical Behavior:  Neurologic State: Alert  Orientation Level: Oriented X4  Cognition: Appropriate decision making, Appropriate for age attention/concentration, Appropriate safety awareness, Follows commands     Range of Motion:                    LLE AROM  L Knee Flexion: 55  L Knee Extension: 0     Functional Mobility Training:  Bed Mobility:  Rolling: Contact guard assistance  Supine to Sit: Contact guard assistance  Sit to Supine: Contact guard assistance  Scooting: Contact guard assistance        Transfers:  Sit to Stand: Contact guard assistance  Stand to Sit: Contact guard assistance                             Balance:  Sitting: Intact  Standing: Intact; With support  Ambulation/Gait Training:  Distance (ft): 20 Feet (ft)  Assistive Device: Walker, rolling;Gait belt  Ambulation - Level of Assistance: Contact guard assistance        Gait Abnormalities: Decreased step clearance; Step to gait                                      Stairs:            Therapeutic Exercises:     EXERCISE   Sets   Reps   Active Active Assist   Passive Self ROM   Comments   Ankle Pumps   [x]                                        []                                        []                                        []                                           Quad Sets   [x]                                        []                                        []                                        []                                           Hamstring Sets   []                                        []                                        []                                        []                                           Short Arc Quads   []                                        []                                        [] []                                           Knee Extension Stretch     []                                          []                                          []                                          []                                           Heel Slides   [x]                                        []                                        []                                        []                                           Long Arc Quads   []                                        []                                        []                                        []                                           Knee Flexion Stretch   []                                        []                                        []                                        []                                           Straight Leg Raises   [x]                                        []                                        []                                        []                                             Pain:  Pain Scale 1: Numeric (0 - 10)  Pain Intensity 1: 7  Pain Location 1: Knee  Pain Orientation 1: Left  Pain Description 1: Aching; Sore  Pain Intervention(s) 1: Medication (see MAR)  Activity Tolerance:   Good  Please refer to the flowsheet for vital signs taken during this treatment.   After treatment:   [] Patient left in no apparent distress sitting up in chair  [x] Patient left in no apparent distress in bed  [x] Call bell left within reach  [x] Nursing notified  [] Caregiver present  [x] Bed alarm activated    COMMUNICATION/COLLABORATION:   The patients plan of care was discussed with: Registered Nurse    Amanda Slade   Time Calculation: 25 mins

## 2018-02-20 NOTE — TELEPHONE ENCOUNTER
Pt returned call and states he is not allergic to Diclofenac and [employer] has been sending the Rx to home with no problems. Pt states he is in [facility] at present time after having knee surgery yesterday.

## 2018-02-20 NOTE — PROGRESS NOTES
Problem: Mobility Impaired (Adult and Pediatric)  Goal: *Acute Goals and Plan of Care (Insert Text)  Physical Therapy Goals  Initiated 2/19/2018    1. Patient will move from supine to sit and sit to supine  in bed with modified independence within 4 days. 2. Patient will perform sit to stand with modified independence within 4 days. 3. Patient will ambulate with modified independence for 150 feet with the least restrictive device within 4 days. 4. Patient will ascend/descend 7 stairs with 1 handrail(s) with modified independence within 4 days. 5. Patient will perform home exercise program per protocol with modified independence within 4 days. 6. Patient will demonstrate AROM 0-90 degrees in operative joint within 4 days. physical Therapy TREATMENT  Patient: Tyler Hernandez (39 y.o. male)  Date: 2/20/2018  Diagnosis: DJD LEFT KNEE   Primary osteoarthritis of left knee <principal problem not specified>  Procedure(s) (LRB):  LEFT TOTAL KNEE REPLACEMENT MAKOPLASTY (Left) 1 Day Post-Op  Precautions: WBAT, Fall  Chart, physical therapy assessment, plan of care and goals were reviewed. ASSESSMENT:  Pt received in bed, requesting to walk into BR. CGA for bed mobility>sitting EOB. Sit<>stand with CGA using RW for steadying. Gait training x 75' with CGA, increased WB thru BUE, improved tolerance for WB LLE with encouragement. Requested to return to bed, reviewed HEP, demonstrates fair QS, able to heel slide to approx 40 degrees independently and able to SLR. Reports increase \"throbbing\" pain with completion of exercise. Wedge and ice pack replaced . Progression toward goals:  []      Improving appropriately and progressing toward goals  [x]      Improving slowly and progressing toward goals  []      Not making progress toward goals and plan of care will be adjusted     PLAN:  Patient continues to benefit from skilled intervention to address the above impairments.   Continue treatment per established plan of care.  Discharge Recommendations:  Home Health  Further Equipment Recommendations for Discharge:  none     SUBJECTIVE:   I guess it is time. OBJECTIVE DATA SUMMARY:   Range of Motion:                    LLE AROM  L Knee Flexion: 55  L Knee Extension: 0     Functional Mobility Training:  Bed Mobility:  Rolling: Contact guard assistance  Supine to Sit: Contact guard assistance  Sit to Supine: Contact guard assistance  Scooting: Contact guard assistance        Transfers:  Sit to Stand: Contact guard assistance  Stand to Sit: Contact guard assistance                             Ambulation/Gait Training:  Distance (ft): 75 Feet (ft)  Assistive Device: Walker, rolling;Gait belt  Ambulation - Level of Assistance: Contact guard assistance        Gait Abnormalities: Decreased step clearance; Step to gait                                      Stairs: Therapeutic Exercises:   Exercises performed per protocol. See morning treatment note for description. Pain:  Pain Scale 1: Numeric (0 - 10)  Pain Intensity 1: 9  Pain Location 1: Knee  Pain Orientation 1: Left  Pain Description 1: Aching; Sore  Pain Intervention(s) 1: Medication (see MAR)  Activity Tolerance:   good  Please refer to the flowsheet for vital signs taken during this treatment.   After treatment:   [] Patient left in no apparent distress sitting up in chair  [x] Patient left in no apparent distress in bed  [x] Call bell left within reach  [x] Nursing notified  [] Caregiver present  [x] Bed alarm activated    COMMUNICATION/COLLABORATION:   The patients plan of care was discussed with: Registered Nurse    Malick Rick   Time Calculation: 25 mins

## 2018-02-20 NOTE — PROGRESS NOTES
Occupational Therapy consult received and acknowledged. Chart reviewed. Patient presents s/p left TKR. Evaluation is deferred to POD 2 at which time patient will have mobility to fully participate in functional task mobility and ADL evaluation. Shira Ratliff MS OTR/L

## 2018-02-20 NOTE — PROGRESS NOTES
TOTAL KNEE ARTHROPLASTY DAILY NOTE     ASSESSMENT / PLAN :   1. Pain Control : Acceptable - Some pain at times, but the patient does not wish to increase their medications  2. Overnight Issues : moderate pain and gastric issues. 3. Wound or incisional issue : Healing incision with no visible drainage  4. Therapy / Weight Bearing Recommendations : Weight bear as tolerated with use of a walker and two person assist while mobilizing  5. DVT Prophylaxis : Mechanical and Aspirin and mechanical lower extremity compression device  6. Disposition : Discharge to home with home health (maximum of 4 visits)  7. Medical Concerns : stable  8. Comments : none, lives alone, likely home on Thursday. Cefuroxime while in house. POD  1 Day Post-Op s/p Procedure(s):  LEFT TOTAL KNEE REPLACEMENT MAKOPLASTY     SUBJECTIVE :     Overnight complaints : none. OBJECTIVE :     Vitals:    02/19/18 1622 02/19/18 2008 02/19/18 2331 02/20/18 0411   BP: 127/85 123/78 102/62 109/60   Pulse: 75 71 68 77   Resp: 16 17 18 18   Temp: 98.6 °F (37 °C) 97.7 °F (36.5 °C) 98.3 °F (36.8 °C) 97.8 °F (36.6 °C)   SpO2: 94% 94% 93% 93%   Weight:       Height:           Alert and oriented x3. Examination of the left knee reveals that the dressing is clean, dry and intact. Motor Exam : Pt is able to perform a straight leg raise. Sensation is intact to light touch. No calf pain.      Labs:  Recent Labs      02/20/18   0542   HGB  12.4   NA  142   K  4.4   CL  109*   CO2  29   BUN  14   CREA  0.87   GLU  88      Patient mobility  Gait  Gait Abnormalities: Decreased step clearance, Step to gait  Ambulation - Level of Assistance: Minimal assistance, Assist x2  Distance (ft): 15 Feet (ft)  Assistive Device: Walker, rolling, Gait belt        Chris Valle MD  Cell [phone]  Zelalem Sylvester PA-C  Cell [phone]  Medical Staff : Toña Hansen  Office : [phone]

## 2018-02-21 ENCOUNTER — TELEPHONE (OUTPATIENT)
Dept: NEUROLOGY | Age: 53
End: 2018-02-21

## 2018-02-21 ENCOUNTER — APPOINTMENT (OUTPATIENT)
Dept: GENERAL RADIOLOGY | Age: 53
DRG: 470 | End: 2018-02-21
Attending: NURSE PRACTITIONER
Payer: MEDICARE

## 2018-02-21 DIAGNOSIS — G43.909 MIGRAINE WITHOUT STATUS MIGRAINOSUS, NOT INTRACTABLE, UNSPECIFIED MIGRAINE TYPE: Primary | ICD-10-CM

## 2018-02-21 LAB — HGB BLD-MCNC: 12 G/DL (ref 12.1–17)

## 2018-02-21 PROCEDURE — 97116 GAIT TRAINING THERAPY: CPT

## 2018-02-21 PROCEDURE — 74011000250 HC RX REV CODE- 250: Performed by: PHYSICIAN ASSISTANT

## 2018-02-21 PROCEDURE — 74011250637 HC RX REV CODE- 250/637: Performed by: PHYSICIAN ASSISTANT

## 2018-02-21 PROCEDURE — 65270000029 HC RM PRIVATE

## 2018-02-21 PROCEDURE — 73560 X-RAY EXAM OF KNEE 1 OR 2: CPT

## 2018-02-21 PROCEDURE — 74011250637 HC RX REV CODE- 250/637: Performed by: NURSE PRACTITIONER

## 2018-02-21 PROCEDURE — 94640 AIRWAY INHALATION TREATMENT: CPT

## 2018-02-21 PROCEDURE — 85018 HEMOGLOBIN: CPT | Performed by: PHYSICIAN ASSISTANT

## 2018-02-21 PROCEDURE — 97530 THERAPEUTIC ACTIVITIES: CPT

## 2018-02-21 PROCEDURE — 74011250636 HC RX REV CODE- 250/636: Performed by: NURSE PRACTITIONER

## 2018-02-21 PROCEDURE — 36415 COLL VENOUS BLD VENIPUNCTURE: CPT | Performed by: PHYSICIAN ASSISTANT

## 2018-02-21 RX ORDER — OXYCODONE HYDROCHLORIDE 5 MG/1
15 TABLET ORAL
Status: DISCONTINUED | OUTPATIENT
Start: 2018-02-21 | End: 2018-02-22 | Stop reason: HOSPADM

## 2018-02-21 RX ORDER — RIZATRIPTAN BENZOATE 10 MG/1
TABLET, ORALLY DISINTEGRATING ORAL
Qty: 9 TAB | Refills: 5 | Status: SHIPPED | OUTPATIENT
Start: 2018-02-21 | End: 2018-02-22 | Stop reason: SDUPTHER

## 2018-02-21 RX ORDER — ONDANSETRON 2 MG/ML
4 INJECTION INTRAMUSCULAR; INTRAVENOUS
Status: DISCONTINUED | OUTPATIENT
Start: 2018-02-21 | End: 2018-02-22 | Stop reason: HOSPADM

## 2018-02-21 RX ORDER — HYDROMORPHONE HYDROCHLORIDE 1 MG/ML
0.5 INJECTION, SOLUTION INTRAMUSCULAR; INTRAVENOUS; SUBCUTANEOUS
Status: COMPLETED | OUTPATIENT
Start: 2018-02-21 | End: 2018-02-21

## 2018-02-21 RX ORDER — OXYCODONE HYDROCHLORIDE 5 MG/1
10 TABLET ORAL
Status: DISCONTINUED | OUTPATIENT
Start: 2018-02-21 | End: 2018-02-22 | Stop reason: HOSPADM

## 2018-02-21 RX ORDER — SUMATRIPTAN 6 MG/.5ML
6 INJECTION, SOLUTION SUBCUTANEOUS
Status: DISCONTINUED | OUTPATIENT
Start: 2018-02-21 | End: 2018-02-22 | Stop reason: HOSPADM

## 2018-02-21 RX ADMIN — CELECOXIB 200 MG: 100 CAPSULE ORAL at 09:23

## 2018-02-21 RX ADMIN — ACETAMINOPHEN 650 MG: 325 TABLET ORAL at 17:19

## 2018-02-21 RX ADMIN — CELECOXIB 200 MG: 100 CAPSULE ORAL at 17:18

## 2018-02-21 RX ADMIN — DOCUSATE SODIUM AND SENNOSIDES 1 TABLET: 8.6; 5 TABLET, FILM COATED ORAL at 09:23

## 2018-02-21 RX ADMIN — POLYETHYLENE GLYCOL (3350) 17 G: 17 POWDER, FOR SOLUTION ORAL at 09:27

## 2018-02-21 RX ADMIN — OXYCODONE HYDROCHLORIDE 10 MG: 5 TABLET ORAL at 09:23

## 2018-02-21 RX ADMIN — ALBUTEROL SULFATE 2.5 MG: 2.5 SOLUTION RESPIRATORY (INHALATION) at 06:03

## 2018-02-21 RX ADMIN — BISACODYL 10 MG: 10 SUPPOSITORY RECTAL at 13:37

## 2018-02-21 RX ADMIN — ACETAMINOPHEN 650 MG: 325 TABLET ORAL at 11:57

## 2018-02-21 RX ADMIN — OXYCODONE HYDROCHLORIDE 15 MG: 5 TABLET ORAL at 17:18

## 2018-02-21 RX ADMIN — OXYCODONE HYDROCHLORIDE 10 MG: 5 TABLET ORAL at 03:17

## 2018-02-21 RX ADMIN — ONDANSETRON 4 MG: 2 INJECTION INTRAMUSCULAR; INTRAVENOUS at 11:57

## 2018-02-21 RX ADMIN — Medication 10 ML: at 06:00

## 2018-02-21 RX ADMIN — ALBUTEROL SULFATE 2.5 MG: 2.5 SOLUTION RESPIRATORY (INHALATION) at 20:13

## 2018-02-21 RX ADMIN — HYDROMORPHONE HYDROCHLORIDE 0.5 MG: 1 INJECTION, SOLUTION INTRAMUSCULAR; INTRAVENOUS; SUBCUTANEOUS at 11:57

## 2018-02-21 RX ADMIN — OXYCODONE HYDROCHLORIDE 15 MG: 5 TABLET ORAL at 13:35

## 2018-02-21 RX ADMIN — FUROSEMIDE 20 MG: 20 TABLET ORAL at 09:23

## 2018-02-21 RX ADMIN — ASPIRIN 325 MG: 325 TABLET, DELAYED RELEASE ORAL at 17:19

## 2018-02-21 RX ADMIN — OXYCODONE HYDROCHLORIDE 10 MG: 5 TABLET ORAL at 05:58

## 2018-02-21 RX ADMIN — ASPIRIN 325 MG: 325 TABLET, DELAYED RELEASE ORAL at 09:23

## 2018-02-21 RX ADMIN — Medication 10 ML: at 13:38

## 2018-02-21 RX ADMIN — FAMOTIDINE 20 MG: 20 TABLET, FILM COATED ORAL at 09:23

## 2018-02-21 RX ADMIN — DOCUSATE SODIUM AND SENNOSIDES 1 TABLET: 8.6; 5 TABLET, FILM COATED ORAL at 17:18

## 2018-02-21 RX ADMIN — FAMOTIDINE 20 MG: 20 TABLET, FILM COATED ORAL at 17:18

## 2018-02-21 RX ADMIN — PANTOPRAZOLE SODIUM 40 MG: 40 TABLET, DELAYED RELEASE ORAL at 17:18

## 2018-02-21 RX ADMIN — BUDESONIDE 500 MCG: 0.5 INHALANT RESPIRATORY (INHALATION) at 20:13

## 2018-02-21 RX ADMIN — SUMATRIPTAN SUCCINATE 100 MG: 25 TABLET ORAL at 13:35

## 2018-02-21 RX ADMIN — ARFORMOTEROL TARTRATE 15 MCG: 15 SOLUTION RESPIRATORY (INHALATION) at 20:13

## 2018-02-21 RX ADMIN — CYCLOBENZAPRINE HYDROCHLORIDE 10 MG: 10 TABLET, FILM COATED ORAL at 09:23

## 2018-02-21 RX ADMIN — TOPIRAMATE 75 MG: 25 TABLET, FILM COATED ORAL at 21:58

## 2018-02-21 RX ADMIN — PANTOPRAZOLE SODIUM 40 MG: 40 TABLET, DELAYED RELEASE ORAL at 09:23

## 2018-02-21 RX ADMIN — ACETAMINOPHEN 650 MG: 325 TABLET ORAL at 05:58

## 2018-02-21 RX ADMIN — OXYCODONE HYDROCHLORIDE 15 MG: 5 TABLET ORAL at 20:50

## 2018-02-21 NOTE — PROGRESS NOTES
Visit documented 2/21/18  Spiritual Care Partner Volunteer visited patient in [address] [facility] on 2/20/18.   Documented by:  Edilson Guerrero [id number] ([id number])

## 2018-02-21 NOTE — ROUTINE PROCESS
Bedside shift change report given to Jo Ann Lewis RN (oncoming nurse) by Isela Muller RN (offgoing nurse). Report included the following information SBAR, Kardex, Intake/Output and MAR.

## 2018-02-21 NOTE — PROGRESS NOTES
Problem: Mobility Impaired (Adult and Pediatric)  Goal: *Acute Goals and Plan of Care (Insert Text)  Physical Therapy Goals  Initiated 2/19/2018    1. Patient will move from supine to sit and sit to supine  in bed with modified independence within 4 days. 2. Patient will perform sit to stand with modified independence within 4 days. 3. Patient will ambulate with modified independence for 150 feet with the least restrictive device within 4 days. 4. Patient will ascend/descend 7 stairs with 1 handrail(s) with modified independence within 4 days. 5. Patient will perform home exercise program per protocol with modified independence within 4 days. 6. Patient will demonstrate AROM 0-90 degrees in operative joint within 4 days. physical Therapy TREATMENT  Patient: Harika Dean (10 y.o. male)  Date: 2/21/2018  Diagnosis: DJD LEFT KNEE   Primary osteoarthritis of left knee <principal problem not specified>  Procedure(s) (LRB):  LEFT TOTAL KNEE REPLACEMENT MAKOPLASTY (Left) 2 Days Post-Op  Precautions: WBAT, Fall  Chart, physical therapy assessment, plan of care and goals were reviewed. ASSESSMENT:  Pt with increase pain this overnight/this morning. Reports 8/10 pain at rest 1 hour after receiving pain medication. described \"burning pain\" along incision and R thigh. Reviewed supine HEP, fair QS, unable to SLR. CGA for bed mobility>sitting EOB and sit<>stand using RW. Gait training x 8' using RW for steadying. RW delivered for home use. Pt returned to bed, min A for sit>supine. Pt states needing another breathing treatment, post activity. O2 sat 98% . RN notified. Progression toward goals:  []      Improving appropriately and progressing toward goals  [x]      Improving slowly and progressing toward goals  []      Not making progress toward goals and plan of care will be adjusted     PLAN:  Patient continues to benefit from skilled intervention to address the above impairments.   Continue treatment per established plan of care. Discharge Recommendations:  Home Health  Further Equipment Recommendations for Discharge:  none     SUBJECTIVE:   Patient stated .    OBJECTIVE DATA SUMMARY:   Critical Behavior:  Neurologic State: Alert  Orientation Level: Oriented X4  Cognition: Appropriate decision making, Appropriate for age attention/concentration, Appropriate safety awareness, Follows commands     Range of Motion:                          Functional Mobility Training:  Bed Mobility:  Rolling: Stand-by asssistance  Supine to Sit: Contact guard assistance  Sit to Supine: Minimum assistance  Scooting: Contact guard assistance        Transfers:  Sit to Stand: Contact guard assistance  Stand to Sit: Contact guard assistance                             Balance:  Standing: Intact; With support  Ambulation/Gait Training:  Distance (ft): 8 Feet (ft)  Assistive Device: Walker, rolling;Gait belt  Ambulation - Level of Assistance: Contact guard assistance        Gait Abnormalities: Step to gait; Decreased step clearance; Antalgic                                      Stairs:            Therapeutic Exercises:     EXERCISE   Sets   Reps   Active Active Assist   Passive Self ROM   Comments   Ankle Pumps   [x]                                        []                                        []                                        []                                           Quad Sets   [x]                                        []                                        []                                        []                                           Hamstring Sets   []                                        []                                        []                                        []                                           Short Arc Quads   []                                        []                                        []                                        []                                           Knee Extension Stretch     []                                          []                                          []                                          []                                           Heel Slides   []                                        [x]                                        []                                        []                                           Long Arc Quads   []                                        []                                        []                                        []                                           Knee Flexion Stretch   []                                        []                                        []                                        []                                           Straight Leg Raises   []                                        [x]                                        []                                        []                                             Pain:  Pain Scale 1: Numeric (0 - 10)  Pain Intensity 1: 4  Pain Location 1: Knee  Pain Orientation 1: Posterior  Pain Description 1: Aching; Intermittent  Pain Intervention(s) 1: Medication (see MAR)  Activity Tolerance:   Fair  Please refer to the flowsheet for vital signs taken during this treatment.   After treatment:   [] Patient left in no apparent distress sitting up in chair  [x] Patient left in no apparent distress in bed  [x] Call bell left within reach  [x] Nursing notified  [] Caregiver present  [x] Bed alarm activated    COMMUNICATION/COLLABORATION:   The patients plan of care was discussed with: Registered Nurse    Vince Rosario   Time Calculation: 34 mins

## 2018-02-21 NOTE — TELEPHONE ENCOUNTER
Sent rizatriptan MLT to [facility] pharmacy on file as per my last office visit.

## 2018-02-21 NOTE — PROGRESS NOTES
TOTAL KNEE ARTHROPLASTY DAILY NOTE     ASSESSMENT / PLAN :   1. Pain Control : Acceptable - Some pain at times, but the patient does not wish to increase their medications  2. Overnight Issues : pain is mostly controlled  3. Wound or incisional issue : Healing incision with no visible drainage  4. Therapy / Weight Bearing Recommendations : Weight bear as tolerated with use of a walker and two person assist while mobilizing  5. DVT Prophylaxis : Mechanical and Aspirin and mechanical lower extremity compression device  6. Disposition : Discharge to home with home health (maximum of 4 visits)  7. Medical Concerns : none  8. Comments : discharge home tomorrow after PT clears     POD  2 Days Post-Op s/p Procedure(s):  LEFT TOTAL KNEE REPLACEMENT MAKOPLASTY     SUBJECTIVE :     Overnight complaints : none. OBJECTIVE :     Vitals:    02/20/18 1917 02/20/18 1928 02/21/18 0028 02/21/18 0424   BP: 120/74  120/69 114/73   Pulse: 90  96 90   Resp: 17  18 19   Temp: 98.1 °F (36.7 °C)  99.2 °F (37.3 °C) 99 °F (37.2 °C)   SpO2: 95% 93% 92% 92%   Weight:       Height:           Alert and oriented x3. Examination of the left knee reveals that the dressing is clean, dry and intact. Motor Exam : Pt is able to perform a straight leg raise. Sensation is intact to light touch. No calf pain.      Labs:  Recent Labs      02/21/18   0540  02/20/18   0542   HGB  12.0*  12.4   NA   --   142   K   --   4.4   CL   --   109*   CO2   --   29   BUN   --   14   CREA   --   0.87   GLU   --   88      Patient mobility  Gait  Gait Abnormalities: Decreased step clearance, Step to gait  Ambulation - Level of Assistance: Contact guard assistance  Distance (ft): 75 Feet (ft)  Assistive Device: Walker, rolling, Gait belt        Jose D Calle MD  Cell [phone]  Mumtaz Taylor PA-C  Cell [phone]  Medical Staff : Justo Thompson  Office : [phone]

## 2018-02-21 NOTE — PROGRESS NOTES
Occupational therapy note:  Chart reviewed. Spoke with patient nurse at 915am, and nurse advised hold OT evaluation until after patient medicated as he is complaining of increased pain. Spoke with treating therapist for PT and she reports patient continues with significant pain, decreased activity tolerance and only able to ambulate 8ft today. Will hold OT evaluation and follow up with patient tomorrow. Shira Ratliff MS OTR/L

## 2018-02-21 NOTE — PROGRESS NOTES
Physical Therapy    1605 Pt experiencing increased knee pain with migraine throughout the day. Currently resting comfortable. Will defer therapy session this afternoon and follow up next treatment day. Herbie Weaver

## 2018-02-21 NOTE — PROGRESS NOTES
Problem: Falls - Risk of  Goal: *Absence of Falls  Document Wallace Fall Risk and appropriate interventions in the flowsheet.    Outcome: Progressing Towards Goal  Fall Risk Interventions:  Mobility Interventions: Patient to call before getting OOB, Bed/chair exit alarm         Medication Interventions: Patient to call before getting OOB, Bed/chair exit alarm    Elimination Interventions: Call light in reach, Patient to call for help with toileting needs

## 2018-02-21 NOTE — INTERDISCIPLINARY ROUNDS
[address]    Patient Information    Name: Fatou Alonso  Age: 45 y.o. Admission Date: 2/19/2018  Surgery/Procedure: Procedure(s):  LEFT TOTAL KNEE REPLACEMENT MAKOPLASTY  Attending Provider: Amena Bearden MD  Surgeon: Enrique Rutledge   Problem List: Active Problems:    Primary osteoarthritis of left knee (2/19/2018)      During rounds the following quality care indicators and evidence based practices were addressed :       PT/OT: Patient mobility - 8', patient limited by increased pain. Bed Mobility Training  Rolling: Stand-by asssistance  Supine to Sit: Contact guard assistance  Sit to Supine: Minimum assistance  Scooting: Contact guard assistance  Transfer Training  Sit to Stand: Contact guard assistance  Stand to Sit: Contact guard assistance  Bed to Chair: Contact guard assistance, Assist x2      Gait Training  Assistive Device: Walker, rolling, Gait belt  Ambulation - Level of Assistance: Contact guard assistance  Distance (ft): 8 Feet (ft)          Pain Assessment  Pain Intensity 1: 2 (02/21/18 1423)  Pain Location 1: Knee  Pain Intervention(s) 1: Medication (see MAR)  Patient Stated Pain Goal: 2    Pain meds: oxycodone  Antibiotics completed: Yes  Anticoagulation:  ASA BID  Bowel regimen: yes  Mechanical DVT prophylaxis: SCDs  Fox: N   Goals For Today: Progress with PT, pain control    RRAT Score:      Readmit Risk Tool  Support Systems: Friends \ neighbors  Relationship with Primary Physician Group: Seen at least one time within the past 6 months    Discharge Management/Planning:    Readmit Risk Assessment Information:   Low Risk            12       Total Score        3 Has Seen PCP in Last 6 Months (Yes=3, No=0)    9 Pt. Coverage (Medicare=5 , Medicaid, or Self-Pay=4)        Criteria that do not apply:    . Living with Significant Other. Assisted Living. LTAC. SNF.  or   Rehab    Patient Length of Stay (>5 days = 3)    IP Visits Last 12 Months (1-3=4, 4=9, >4=11) Charlson Comorbidity Score (Age + Comorbid Conditions)         Readmit Risk Tool  Support Systems: Friends \ neighbors  Relationship with Primary Physician Group: Seen at least one time within the past 6 months    [facility]: [address]:  Anticipated Date of Discharge: tomorrow  Barrier to Discharge (if any): n/a    Interdisciplinary team rounds were held with the following team members: Nurse Practitioner, Nursing, Pharmacy, and Rehab. See clinical pathway and/or care plan for interventions and desired outcomes.

## 2018-02-22 VITALS
WEIGHT: 190.92 LBS | TEMPERATURE: 98.9 F | HEART RATE: 91 BPM | RESPIRATION RATE: 18 BRPM | OXYGEN SATURATION: 93 % | DIASTOLIC BLOOD PRESSURE: 74 MMHG | SYSTOLIC BLOOD PRESSURE: 128 MMHG | HEIGHT: 67 IN | BODY MASS INDEX: 29.97 KG/M2

## 2018-02-22 PROCEDURE — 97530 THERAPEUTIC ACTIVITIES: CPT | Performed by: OCCUPATIONAL THERAPIST

## 2018-02-22 PROCEDURE — 97116 GAIT TRAINING THERAPY: CPT

## 2018-02-22 PROCEDURE — 97535 SELF CARE MNGMENT TRAINING: CPT | Performed by: OCCUPATIONAL THERAPIST

## 2018-02-22 PROCEDURE — 74011000250 HC RX REV CODE- 250: Performed by: PHYSICIAN ASSISTANT

## 2018-02-22 PROCEDURE — 97110 THERAPEUTIC EXERCISES: CPT

## 2018-02-22 PROCEDURE — 94640 AIRWAY INHALATION TREATMENT: CPT

## 2018-02-22 PROCEDURE — 97530 THERAPEUTIC ACTIVITIES: CPT

## 2018-02-22 PROCEDURE — 97165 OT EVAL LOW COMPLEX 30 MIN: CPT | Performed by: OCCUPATIONAL THERAPIST

## 2018-02-22 PROCEDURE — 74011250637 HC RX REV CODE- 250/637: Performed by: NURSE PRACTITIONER

## 2018-02-22 PROCEDURE — 74011250637 HC RX REV CODE- 250/637: Performed by: PHYSICIAN ASSISTANT

## 2018-02-22 RX ORDER — OXYCODONE HYDROCHLORIDE 10 MG/1
10-15 TABLET ORAL
Qty: 70 TAB | Refills: 0 | Status: SHIPPED | OUTPATIENT
Start: 2018-02-22 | End: 2018-06-05

## 2018-02-22 RX ORDER — RIZATRIPTAN BENZOATE 10 MG/1
TABLET, ORALLY DISINTEGRATING ORAL
Qty: 27 TAB | Refills: 1 | Status: SHIPPED | OUTPATIENT
Start: 2018-02-22 | End: 2018-07-10 | Stop reason: SDUPTHER

## 2018-02-22 RX ADMIN — PANTOPRAZOLE SODIUM 40 MG: 40 TABLET, DELAYED RELEASE ORAL at 09:50

## 2018-02-22 RX ADMIN — Medication 5 ML: at 06:00

## 2018-02-22 RX ADMIN — OXYCODONE HYDROCHLORIDE 15 MG: 5 TABLET ORAL at 03:40

## 2018-02-22 RX ADMIN — BUDESONIDE 500 MCG: 0.5 INHALANT RESPIRATORY (INHALATION) at 07:08

## 2018-02-22 RX ADMIN — Medication 10 ML: at 00:32

## 2018-02-22 RX ADMIN — OXYCODONE HYDROCHLORIDE 15 MG: 5 TABLET ORAL at 12:49

## 2018-02-22 RX ADMIN — OXYCODONE HYDROCHLORIDE 15 MG: 5 TABLET ORAL at 00:30

## 2018-02-22 RX ADMIN — POLYETHYLENE GLYCOL (3350) 17 G: 17 POWDER, FOR SOLUTION ORAL at 09:48

## 2018-02-22 RX ADMIN — ALBUTEROL SULFATE 2.5 MG: 2.5 SOLUTION RESPIRATORY (INHALATION) at 07:08

## 2018-02-22 RX ADMIN — ASPIRIN 325 MG: 325 TABLET, DELAYED RELEASE ORAL at 09:50

## 2018-02-22 RX ADMIN — ACETAMINOPHEN 650 MG: 325 TABLET ORAL at 00:30

## 2018-02-22 RX ADMIN — DOCUSATE SODIUM AND SENNOSIDES 1 TABLET: 8.6; 5 TABLET, FILM COATED ORAL at 09:49

## 2018-02-22 RX ADMIN — ACETAMINOPHEN 650 MG: 325 TABLET ORAL at 05:39

## 2018-02-22 RX ADMIN — Medication 5 ML: at 12:50

## 2018-02-22 RX ADMIN — OXYCODONE HYDROCHLORIDE 15 MG: 5 TABLET ORAL at 09:49

## 2018-02-22 RX ADMIN — ACETAMINOPHEN 650 MG: 325 TABLET ORAL at 12:21

## 2018-02-22 RX ADMIN — OXYCODONE HYDROCHLORIDE 15 MG: 5 TABLET ORAL at 06:54

## 2018-02-22 RX ADMIN — CELECOXIB 200 MG: 100 CAPSULE ORAL at 09:49

## 2018-02-22 NOTE — PROGRESS NOTES
Problem: Mobility Impaired (Adult and Pediatric)  Goal: *Acute Goals and Plan of Care (Insert Text)  Physical Therapy Goals  Initiated 2/19/2018    1. Patient will move from supine to sit and sit to supine  in bed with modified independence within 4 days. 2. Patient will perform sit to stand with modified independence within 4 days. 3. Patient will ambulate with modified independence for 150 feet with the least restrictive device within 4 days. 4. Patient will ascend/descend 7 stairs with 1 handrail(s) with modified independence within 4 days. 5. Patient will perform home exercise program per protocol with modified independence within 4 days. 6. Patient will demonstrate AROM 0-90 degrees in operative joint within 4 days. physical Therapy TREATMENT  Patient: Abhinav Lynn (49 y.o. male)  Date: 2/22/2018  Diagnosis: DJD LEFT KNEE   Primary osteoarthritis of left knee <principal problem not specified>  Procedure(s) (LRB):  LEFT TOTAL KNEE REPLACEMENT MAKOPLASTY (Left) 3 Days Post-Op  Precautions: Seizure, Fall  Chart, physical therapy assessment, plan of care and goals were reviewed. ASSESSMENT:  Pt received in bed, one family member present,treports 4-5/10 pain 30min after recievedin pain medications,CGA for bed mobility and functional transfers using RW for steadying. ascen /descend 6 steps usign single handrail on left and SPC on R with CGA. No buckling noted. Gait training x 60' using RW with CGA. Reviewed HEP and proper car transfers. Pt verbalized understanding to use RW for all gait and transfers. Pt is cleared for discharge from hospital from PT perspective. RN notified. Progression toward goals:  [x]      Improving appropriately and progressing toward goals  []      Improving slowly and progressing toward goals  []      Not making progress toward goals and plan of care will be adjusted     PLAN:  Patient continues to benefit from skilled intervention to address the above impairments.   Continue treatment per established plan of care. Discharge Recommendations:  Home Health  Further Equipment Recommendations for Discharge:  none     SUBJECTIVE:   Patient stated I am as good as I am going to get. Red Stable    OBJECTIVE DATA SUMMARY:   Critical Behavior:  Neurologic State: Alert, Irritable  Orientation Level: Oriented X4  Cognition: Follows commands, Appropriate decision making, Appropriate for age attention/concentration, Appropriate safety awareness  Safety/Judgement: Awareness of environment, Fall prevention, Home safety, Insight into deficits  Range of Motion:                    LLE AROM  L Knee Flexion: 30  L Knee Extension: 5     Functional Mobility Training:  Bed Mobility:     Supine to Sit: Contact guard assistance  Sit to Supine: Contact guard assistance  Scooting: Contact guard assistance        Transfers:  Sit to Stand: Contact guard assistance  Stand to Sit: Contact guard assistance        Bed to Chair: Modified independent; Additional time (hip hiking and no knee flexion initially)                    Balance:  Sitting: Intact  Standing: Intact; With support  Ambulation/Gait Training:  Distance (ft): 60 Feet (ft)  Assistive Device: Gait belt  Ambulation - Level of Assistance: Contact guard assistance        Gait Abnormalities: Decreased step clearance                                      Stairs:  Number of Stairs Trained: 6  Stairs - Level of Assistance: Contact guard assistance  Rail Use: Left  (SPC on R)  Therapeutic Exercises:     EXERCISE   Sets   Reps   Active Active Assist   Passive Self ROM   Comments   Ankle Pumps   [x]                                        []                                        []                                        []                                           Quad Sets   [x]                                        []                                        []                                        []                                           Hamstring Sets   [] []                                        []                                        []                                           Short Arc Quads   []                                        []                                        []                                        []                                           Knee Extension Stretch     []                                          []                                          []                                          []                                           Heel Slides   [x]                                        []                                        []                                        []                                           Long Arc Quads   []                                        []                                        []                                        []                                           Knee Flexion Stretch   []                                        []                                        []                                        []                                           Straight Leg Raises   []                                        [x]                                        []                                        []                                             Pain:  Pain Scale 1: Numeric (0 - 10)  Pain Intensity 1: 5 (pre PT)  Pain Location 1: Knee  Pain Orientation 1: Left  Pain Description 1: Sore;Aching  Pain Intervention(s) 1: Medication (see MAR)  Activity Tolerance:   Good  Please refer to the flowsheet for vital signs taken during this treatment.   After treatment:   [] Patient left in no apparent distress sitting up in chair  [x] Patient left in no apparent distress in bed  [x] Call bell left within reach  [x] Nursing notified  [x] Caregiver present  [] Bed alarm activated    COMMUNICATION/COLLABORATION:   The patients plan of care was discussed with: Registered Nurse    Leela Hairston   Time Calculation: 25 mins

## 2018-02-22 NOTE — ROUTINE PROCESS
Bedside and Verbal shift change report given to Elayne Gonzalez (oncoming nurse) by Rosario Payne (offgoing nurse). Report included the following information SBAR, Kardex and MAR.

## 2018-02-22 NOTE — PROGRESS NOTES
Nurse handed patient multiple scripts including a script for Tramadol and Oxycodone IR. Nurse spoke with Javier Favre FNP concerning antibiotics. Nurse handed patient a copy of discharge instructions. Instructions and scripts read and explained to him and his friend.  Verbalized understanding

## 2018-02-22 NOTE — PROGRESS NOTES
Problem: Mobility Impaired (Adult and Pediatric)  Goal: *Acute Goals and Plan of Care (Insert Text)  Physical Therapy Goals  Initiated 2/19/2018    1. Patient will move from supine to sit and sit to supine  in bed with modified independence within 4 days. 2. Patient will perform sit to stand with modified independence within 4 days. 3. Patient will ambulate with modified independence for 150 feet with the least restrictive device within 4 days. 4. Patient will ascend/descend 7 stairs with 1 handrail(s) with modified independence within 4 days. 5. Patient will perform home exercise program per protocol with modified independence within 4 days. 6. Patient will demonstrate AROM 0-90 degrees in operative joint within 4 days. physical Therapy TREATMENT  Patient: Reba Degroot (72 y.o. male)  Date: 2/22/2018  Diagnosis: DJD LEFT KNEE   Primary osteoarthritis of left knee <principal problem not specified>  Procedure(s) (LRB):  LEFT TOTAL KNEE REPLACEMENT MAKOPLASTY (Left) 3 Days Post-Op  Precautions: WBAT, Fall  Chart, physical therapy assessment, plan of care and goals were reviewed. ASSESSMENT:  Pt received exiting BR with OT, agreeable to participate with physical therapy. Gait training x 76' with CGA using RW with slow but steady step thru gait pattern. Pt requested to return to bed,secondary to pain and fatigue report 8/10 pain post activity. Flexion limited to 30degrees secondary to pain. Will attempt stair training this afternoon. Progression toward goals:  []      Improving appropriately and progressing toward goals  [x]      Improving slowly and progressing toward goals  []      Not making progress toward goals and plan of care will be adjusted     PLAN:  Patient continues to benefit from skilled intervention to address the above impairments. Continue treatment per established plan of care.   Discharge Recommendations:  Home Health  Further Equipment Recommendations for Discharge:  none     SUBJECTIVE: Patient stated Andrez Any a little better than yesterday. Prince Kim    OBJECTIVE DATA SUMMARY:   Critical Behavior:  Neurologic State: Alert  Orientation Level: Oriented X4  Cognition: Appropriate decision making, Appropriate for age attention/concentration, Appropriate safety awareness, Follows commands     Range of Motion:                    LLE AROM  L Knee Flexion: 30  L Knee Extension: 5     Functional Mobility Training:  Bed Mobility:        Sit to Supine: Contact guard assistance  Scooting: Contact guard assistance        Transfers:                                   Balance:  Sitting: Intact  Standing: Intact; With support  Ambulation/Gait Training:                                                        Stairs:            Therapeutic Exercises:     EXERCISE   Sets   Reps   Active Active Assist   Passive Self ROM   Comments   Ankle Pumps   [x]                                        []                                        []                                        []                                           Quad Sets   [x]                                        []                                        []                                        []                                           Hamstring Sets   []                                        []                                        []                                        []                                           Short Arc Quads   []                                        []                                        []                                        []                                           Knee Extension Stretch     []                                          []                                          [x]                                          []                                           Heel Slides   [x]                                        []                                        []                                        [] Long Arc Quads   []                                        []                                        []                                        []                                           Knee Flexion Stretch   []                                        []                                        []                                        []                                           Straight Leg Raises   []                                        [x]                                        []                                        []                                             Pain:  Pain Scale 1: Numeric (0 - 10)  Pain Intensity 1: 7  Pain Location 1: Knee  Pain Orientation 1: Left  Pain Description 1: Sore;Aching  Pain Intervention(s) 1: Medication (see MAR)  Activity Tolerance:   Good  Please refer to the flowsheet for vital signs taken during this treatment.   After treatment:   [] Patient left in no apparent distress sitting up in chair  [x] Patient left in no apparent distress in bed  [x] Call bell left within reach  [x] Nursing notified  [] Caregiver present  [x] Bed alarm activated    COMMUNICATION/COLLABORATION:   The patients plan of care was discussed with: Registered Nurse    Stefani Varghese   Time Calculation: 24 mins
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
## 2018-02-22 NOTE — TELEPHONE ENCOUNTER
----- Message from Lizzette Osborne sent at 2/22/2018 10:58 AM EST -----  Regarding: LINDSAY Romero/telephone  Pt would like the prescription for the migraine medication that was sent to the [facility] yesterday to be sent to [facility] mail order pharmacy instead. Please call pt to confirm that the prescription has been sent to [facility]. Pt can be reached at [phone].

## 2018-02-22 NOTE — DISCHARGE INSTRUCTIONS
TOTAL KNEE DISCHARGE INSTRUCTIONS      Patient: Candice Friedman MRN: [id number]  SSN: [id number]              Please take the time to review the following instructions before you leave the hospital and use them as guidelines during your recovery from surgery. If you have any questions you may contact my office at [phone]  After business hours or during the weekend you can contact me at [phone] (cell phone) for emergency's. Please use the office number during regular business hours. SPECIAL INSTRUCTIONS :   1. Full extension at the knee is the most important aspect of your range of motion. Avoid placing a pillow or bump behind the knee. Rather, place the heel up on a bump or pillow and allow gravity to help straighten the knee. 2. You may weight bear as tolerated on the knee and during the day you should bend the knee as much as possible. 3. Drainage from the incision more than 4 days from surgery is concerning. Contact my office if there is any question [phone]. Wound Care/ Dressing Changes:    DRESSING :     Aquacel Dressing : This may be removed by home health 7 days after the date of your surgery. If there is no drainage, then a simple dressing may be used or no dressing at all. Other dressing options can be purchased over the counter at a local pharmacy or medical supply vendor. A porous adhesive dressing such as pictured above can be purchased at a local [employer] or [employer]. You only need to keep the incision covered for 7 days after showers. A dressing may be used for longer if there are issues with clothing clinging to the incision. Showering/ Bathing: You may shower with the aquacel dressing in place. This is left in place for 7 days following discharge from the hospital. If your incision is dry without drainage you may shower following your discharge home. After 7 days your aquacel dressing should be removed for showering.  It is fine to have water run over the incision. Do not vigorously scrub your incision. Apply a clean, dry dressing after you have dried your incision. Do not take a bath or get into a swimming pool / [place] until you follow up with Dr. Shey Aceves. Do not soak your incision under water. If there is continued drainage or you are concerned contact Dr Talia Carey office prior to showering [phone]. Diet:  You may advance to your regular diet as tolerated. Increase your clear liquid intake for the next 2-3 days. Nutrition Recommendations for Discharge:    Continue Oral Nutrition Supplements at discharge:   Ensure Enlive or Ensure Plus 1-2 times per day   for 30 days unless otherwise directed by your Primary Care Physician. This product can be purchased at your local grocery store or drug store and online. Agusto Wiseman, RD   _        Medication:      1. You will be given prescriptions for pain medication when you are discharged from the hospital. The side effects of these medications can be substantial and the narcotic medications are not mandatory. You may substitute these medications with Tylenol or Alleve / Motrin. 2.  Please use the medications as prescribed. Pain medications may cause constipation- Colace twice daily and Miralax one scoop daily while taking the narcotic medication should help prevent constipation. Please discuss with your local pharmacist regarding increasing this dosage if constipation persists. Other possible side effects of pain medication are dizziness, headache, nausea, vomiting, and urinary retention. Discontinue the pain medication if you develop itching, rash, shortness of breath, or difficulties swallowing. If these symptoms become severe or are not relieved by discontinuing the medication, you should seek immediate medical attention. 3. Refills of pain medication are authorized during office hours only (8 AM- 5 PM  Monday thru Friday).  Many of these medication will require you or a family member to pick-up a physical prescription at the office. 4. Medications other than antiinflammatories will not be called into the pharmacy after business hours. 5. Do not take Tylenol/Acetaminophen in addition to your pain medication as most pain medications already contain this ingredient. Do not exceed 4000mg of Tylenol/Acetaminophen per day. 6. You may resume the medication(s) you were taking prior to your surgery. Narcotics may change the effects of some antidepressant medication(s). If you have any questions about possible interactions between your regular medications and the pain medication, you should ask the pharmacist or contact the prescribing physician. 7. You will be discharged with prescriptions for additional pain medications (Tramadol or Toradol) and a medication for nausea and vomiting (Phenergan). You only need to fill these prescriptions if the primary pain medication is not working or you experiencing post-op nausea. 8. If you have constipation which is not improved by oral stool softeners then a Ducolax suppository should be purchased over the counter. 9. Continue the blood thinner (Aspirin or Lovenox) for a total of 30 days following surgery. Follow up appointment:    Please call our office at [phone] for your follow up appointment. This should be scheduled 14 days following the date of surgery. Physical Therapy / Nursing:    Physical Therapy following surgery will be arranged at home along with at home nursing care. They have specific instructions for rehab and wound care. .     Returning to work:    Normal return to work is 3-12 weeks following total knee replacement. Depending on your progression following surgery and specific job duties you may take longer for a full return to work. DRIVING    You should not return to driving until you are off all narcotic pain medications and able to safely and quickly apply the brakes.  This is normally 3-4 weeks for left knees and 4-6 weeks for right knees. Important Signs and Symptoms:    If any of the following signs or symptoms occur, you should contact Dr. Karyle Overcast office. Please be advised if a problem arises which you feel requires immediate medical attention or you are unable to contact Dr. Karyle Overcast office you should seek immediate medical attention at the ER or other health care facility you have access to.    1. A sudden increase in swelling and/or redness or warmth at the area your surgery was performed which isnt relieved by rest, ice, and elevation. 2. Oral temperature greater than 101 degrees for 12 hours or more which isnt relieved by an increase in fluid intake and taking 2 Tylenol every 4-6 hours. 3. Excessive drainage from your incisions, or drainage which hasnt stopped by 72 hours after your surgery. 4. Fever, chills, shortness of breath, chest pain, nausea, vomiting or other signs and symptoms which are of concern to you. frequently asked questions   What should I take for pain?  o In general you will be discharged with three medications for pain (Extra Strength Tylenol, Oxycodone 5mg and Tramadol). This may vary slightly depending on what you were taking in the hospital.   - 1st Line - Extra Strength Tylenol 1-2 tablets (500-1000mg) every 4-6 hrs.  After 2 days this dose should not exceed 8 tablets per day   This is the first and only medication you need to take. Initially you may need 2 tablets every 4 hours, but as your pain subsides, this will taper to 1 tablet every 6 hours. - 2nd Line - Tramadol 50mg (1 tablet) every 8 hours  - 3rd Line - Oxycodone 1 (5mg) - 2 (10mg) every 4 hours (Or as directed), take these between Percocet doses if your pain is not below 4 / 10. This may be needed only for several days following your discharge.    - 4th Line - Add Alleve 220mg every 12 hours or Motrin 400mg (200mg x 2) every 8 hours   When should I call for advice regarding my pain?  o After 12 hours on the above regimen, if nothing is working call the office ([phone] or [phone]) or call my cell after hours [phone].  Can I get refills?  o Narcotic refills are provided for the first 6 weeks following surgery. - I will generally try to taper down to a single narcotic medication by your two week appointment. o Try Tylenol 650mg along with Alleve 220mg or Motrin 200mg during the majority of the day. - Save the narcotic pain medications for physical therapy (1 hour prior) and before sleeping at night. - Keep in mind you need to discontinue these medications prior to returning to driving.  Is swelling normal?  o Almost everyone has some degree of swelling following surgery. o Following hip and knee replacement surgery, swelling can be normal below the incision for the first 1-2 weeks. - This swelling peaks around 5-7 days after surgery.   - You may have some bruising around the back of the thigh, calf and even into the foot.  What should I do for the swelling?  o Keep the limb elevated. o Apply compression socks (knee high for total knees and up to the mid-thigh for total hips.   o Heat or ice may be applied, choose the modality that makes you the most comfortable.  How long should I remain on blood thinners following surgery?  o Thirty days   When can I drive?  o Once you have stopped using regular narcotic pain medications (Percocet, Lortab, etc.) and can safely apply the brakes without hesitation.  When can I shower? o 72hours following surgery if the incision is dry.  o No submersion of the incision, bathing or swimming for 14 days following surgery or until cleared by Dr Lyn Cruz.  What do I do with the dressing when I shower?  o The dressing can be removed. o The incision is sealed with Dermabond (Biologic glue) and except for wounds which are draining should be watertight.  How active should I be following surgery?   o Progress activities in moderation at your own pace.   o Walk each day and set progressive goals with small increments (1st week - ½ block of walking, 2nd week - 1 block, 3rd week - 2 blocks, etc.)    Please do not hesitate to call me at [phone] (cell phone) for questions following surgery - MD Quin Ascencio MD  Cell [phone]  Steven Pemberton PA-C  Cell [phone]  Medical Staff : Daniela Hill  Office : [phone]

## 2018-02-22 NOTE — TELEPHONE ENCOUNTER
Patient called and informed maxalt was sent to [facility] [employer] Went over instructions for administration with patient. Patient states understanding.

## 2018-02-25 NOTE — DISCHARGE SUMMARY
@9NKRF@ [address] [place] [address]   [address] [address]    DISCHARGE SUMMARY     Patient: Timothy Russell Record Number: [id number]                : 1965  Age: 45 y.o. Admit Date: 2018  Discharge Date: 2018    Admission Diagnosis: DJD LEFT KNEE   Primary osteoarthritis of left knee  Discharge Diagnosis: DJD LEFT KNEE     Procedures: Procedure(s):  LEFT TOTAL KNEE REPLACEMENT MAKOPLASTY    Surgeon: Ananda Cerrato MD  Assistants: Jud Reed PA-C    Anesthesia: spinal  Complications: None     History of Present Illness:  Rahel Chambers is a 45 y.o. male with a history of Left knee pain, swelling, and marked loss of function. Despite conservative management and after clinical and radiographic evaluation, it was determined that he suffered from end-stage osteoarthritis and would benefit from Procedure(s):  LEFT TOTAL [address], which he consented to undergo after a discussion of the risks, benefits, alternatives, rehab concerns, and potential complications of surgery. Hospital Course:  Rahel Chambers tolerated the procedure well. He was transferred  to the recovery room in stable condition. After a brief stay the patient was then transferred to the Joint Replacement Unit at [address] [place] [address]. On postoperative day #1, the dressing was clean and dry, he was neurovascularly intact. The patient was afebrile and vital signs were stable. Calves were soft and non-tender bilaterally. On postoperative day  # 2, the patient was tolerating a regular diet and making satisfactory progress with physical therapy. Hemoglobin and INR prior to discharge were   Lab Results   Component Value Date/Time    HGB 12.0 (L) 2018 05:40 AM    INR 1.0 2016 11:29 AM       Rahel Chambers was cleared by physical therapy and was discharged to Home in stable condition on postoperative day 2.   He was provided with routine postoperative instructions and advised to follow up in my office in 2 weeks following discharge from the hospital.  He was prescribed aspirin for DVT prophylaxis, tramadol and oxycodone for post-operative pain, dulcolax suppository for constipation, and zofran for nausea. Discharge Medications:  Cannot display discharge medications since this patient is not currently admitted.       Signed by: Jud Goncalves MD  2/25/2018

## 2018-02-28 ENCOUNTER — HOSPITAL ENCOUNTER (OUTPATIENT)
Dept: VASCULAR SURGERY | Age: 53
Discharge: HOME | End: 2018-02-28
Attending: PHYSICIAN ASSISTANT
Payer: MEDICARE

## 2018-02-28 DIAGNOSIS — Z96.652 H/O TOTAL KNEE REPLACEMENT, LEFT: ICD-10-CM

## 2018-02-28 DIAGNOSIS — R60.0 EDEMA OF LOWER EXTREMITY: ICD-10-CM

## 2018-02-28 PROCEDURE — 93971 EXTREMITY STUDY: CPT

## 2018-02-28 NOTE — PROCEDURES
[facility]  *** FINAL REPORT ***    Name: Tyler Patel  MRN: [id number]    Outpatient  : 1965  HIS Order #: [id number]  [address] [facility] Visit #: [id number]  Date: 2018    TYPE OF TEST: Peripheral Venous Testing    REASON FOR TEST  Limb swelling, Edema    Left Leg:-  Deep venous thrombosis:           No  Superficial venous thrombosis:    No  Deep venous insufficiency:        No  Superficial venous insufficiency: No      INTERPRETATION/FINDINGS  PROCEDURE:  LEFT LOWER EXTREMITY VENOUS DUPLEX . Evaluation of lower  extremity veins with ultrasound (B-mode imaging, pulsed Doppler, color   Doppler). Includes the common femoral, deep femoral, femoral,  popliteal, posterior tibial, peroneal, and great saphenous veins. Other veins, for example the gastrocnemius and soleal veins, may also  be visualized. FINDINGS: Unable to visualize the left peroneal veins due to excess  edema. Gray scale and color flow duplex images of the veins visualized   in the left lower extremity demonstrate normal compressibility,  spontaneous and augmented flow profiles, and absence of filling  defects throughout the deep and superficial veins in the left lower  extremity. CONCLUSION: Left lower extremity venous duplex negative for deep  venous thrombosis or thrombophlebitis. Two enlarged lymph nodes noted  in the left groin, the largest measuring 4.62 x 0.87 cm. Right common  femoral vein is thrombus free. ADDITIONAL COMMENTS    I have personally reviewed the data relevant to the interpretation of  this  study.     TECHNOLOGIST: Radha Orlando RDCS  Signed: 2018 10:39 AM    PHYSICIAN: Alma Wilson MD  Signed: 2018 04:17 PM

## 2018-03-14 DIAGNOSIS — M54.50 ACUTE BILATERAL LOW BACK PAIN WITHOUT SCIATICA: ICD-10-CM

## 2018-03-14 RX ORDER — DICLOFENAC SODIUM 75 MG/1
TABLET, DELAYED RELEASE ORAL
Qty: 60 TAB | Refills: 0 | Status: SHIPPED | OUTPATIENT
Start: 2018-03-14

## 2018-05-23 ENCOUNTER — OFFICE VISIT (OUTPATIENT)
Dept: FAMILY MEDICINE CLINIC | Age: 53
End: 2018-05-23

## 2018-05-23 VITALS
DIASTOLIC BLOOD PRESSURE: 90 MMHG | OXYGEN SATURATION: 97 % | WEIGHT: 188.2 LBS | TEMPERATURE: 97.8 F | BODY MASS INDEX: 29.54 KG/M2 | RESPIRATION RATE: 18 BRPM | HEIGHT: 67 IN | SYSTOLIC BLOOD PRESSURE: 130 MMHG | HEART RATE: 82 BPM

## 2018-05-23 DIAGNOSIS — M25.562 CHRONIC PAIN OF LEFT KNEE: Primary | ICD-10-CM

## 2018-05-23 DIAGNOSIS — G89.29 CHRONIC PAIN OF LEFT KNEE: Primary | ICD-10-CM

## 2018-05-23 NOTE — PROGRESS NOTES
HISTORY OF PRESENT ILLNESS  Zeina Franklin is a 52 y.o. male. HPI  Pt presents with \"knee pain\"    Pt states that he is here today for an order to see another orthopedic doctor, in regards to his left knee pain. Pt was seen by Dr Lian Quiles at [facility] for knee pain, and had a total left knee replacement on February 19th, by Dr. Lian Quiles. Pt states that he is in more pain after the surgery, then he was prior. Pt states that he feels like he has made no progress, and would like a second opinion by a different ortho. Pt continues to do PT for the knee. Review of Systems   Constitutional: Negative for fever. HENT: Negative for congestion. Respiratory: Negative for cough. Gastrointestinal: Negative for diarrhea and vomiting. Musculoskeletal: Positive for joint pain. Physical Exam   Constitutional: He is oriented to person, place, and time. He appears well-developed and well-nourished. HENT:   Head: Normocephalic and atraumatic. Cardiovascular: Normal rate, regular rhythm and normal heart sounds. Pulmonary/Chest: Effort normal and breath sounds normal.   Musculoskeletal:        Left knee: He exhibits decreased range of motion. Neurological: He is alert and oriented to person, place, and time. Skin: Skin is warm and dry. Psychiatric: He has a normal mood and affect. His behavior is normal.       ASSESSMENT and PLAN    ICD-10-CM ICD-9-CM    1. Chronic pain of left knee M25.562 719.46 REFERRAL TO ORTHOPEDICS    G89.29 338.29      Informed patient that he should call [facility] and make an appointment as he sees fit. Pt informed to return to office with worsening of symptoms, or PRN with any questions or concerns. Pt verbalizes understanding of plan of care and denies further questions or concerns at this time.

## 2018-05-23 NOTE — PROGRESS NOTES
Chief Complaint   Patient presents with    Knee Pain     pt wants a referral to another different Ortho doctor for his lack of improvement after knee surgery 3 months ago. does not want to go back to [facility] \"REVIEWED RECORD IN PREPARATION FOR VISIT AND HAVE OBTAINED THE NECESSARY DOCUMENTATION\"  1. Have you been to the ER, urgent care clinic since your last visit? Hospitalized since your last visit? No    2. Have you seen or consulted any other health care providers outside of the [address] [place] [address] since your last visit? Include any pap smears or colon screening. No  Patient does not have advanced directives.

## 2018-05-23 NOTE — PATIENT INSTRUCTIONS
Knee Pain or Injury: Care Instructions  Your Care Instructions    Injuries are a common cause of knee problems. Sudden (acute) injuries may be caused by a direct blow to the knee. They can also be caused by abnormal twisting, bending, or falling on the knee. Pain, bruising, or swelling may be severe, and may start within minutes of the injury. Overuse is another cause of knee pain. Other causes are climbing stairs, kneeling, and other activities that use the knee. Everyday wear and tear, especially as you get older, also can cause knee pain. Rest, along with home treatment, often relieves pain and allows your knee to heal. If you have a serious knee injury, you may need tests and treatment. Follow-up care is a key part of your treatment and safety. Be sure to make and go to all appointments, and call your doctor if you are having problems. It's also a good idea to know your test results and keep a list of the medicines you take. How can you care for yourself at home? · Be safe with medicines. Read and follow all instructions on the label. ¨ If the doctor gave you a prescription medicine for pain, take it as prescribed. ¨ If you are not taking a prescription pain medicine, ask your doctor if you can take an over-the-counter medicine. · Rest and protect your knee. Take a break from any activity that may cause pain. · Put ice or a cold pack on your knee for 10 to 20 minutes at a time. Put a thin cloth between the ice and your skin. · Prop up a sore knee on a pillow when you ice it or anytime you sit or lie down for the next 3 days. Try to keep it above the level of your heart. This will help reduce swelling. · If your knee is not swollen, you can put moist heat, a heating pad, or a warm cloth on your knee. · If your doctor recommends an elastic bandage, sleeve, or other type of support for your knee, wear it as directed.   · Follow your doctor's instructions about how much weight you can put on your leg. Use a cane, crutches, or a walker as instructed. · Follow your doctor's instructions about activity during your healing process. If you can do mild exercise, slowly increase your activity. · Reach and stay at a healthy weight. Extra weight can strain the joints, especially the knees and hips, and make the pain worse. Losing even a few pounds may help. When should you call for help? Call 911 anytime you think you may need emergency care. For example, call if:  ? · You have symptoms of a blood clot in your lung (called a pulmonary embolism). These may include:  ¨ Sudden chest pain. ¨ Trouble breathing. ¨ Coughing up blood. ?Call your doctor now or seek immediate medical care if:  ? · You have severe or increasing pain. ? · Your leg or foot turns cold or changes color. ? · You cannot stand or put weight on your knee. ? · Your knee looks twisted or bent out of shape. ? · You cannot move your knee. ? · You have signs of infection, such as:  ¨ Increased pain, swelling, warmth, or redness. ¨ Red streaks leading from the knee. ¨ Pus draining from a place on your knee. ¨ A fever. ? · You have signs of a blood clot in your leg (called a deep vein thrombosis), such as:  ¨ Pain in your calf, back of the knee, thigh, or groin. ¨ Redness and swelling in your leg or groin. ? Watch closely for changes in your health, and be sure to contact your doctor if:  ? · You have tingling, weakness, or numbness in your knee. ? · You have any new symptoms, such as swelling. ? · You have bruises from a knee injury that last longer than 2 weeks. ? · You do not get better as expected. Where can you learn more? Go to http://oscar-keely.info/. Enter K195 in the search box to learn more about \"Knee Pain or Injury: Care Instructions. \"  Current as of: March 20, 2017  Content Version: 11.4  © 1202-8575 [employer].  Care instructions adapted under license by [employer] (which disclaims liability or warranty for this information). If you have questions about a medical condition or this instruction, always ask your healthcare professional. [address] any warranty or liability for your use of this information.

## 2018-05-23 NOTE — MR AVS SNAPSHOT
[address] [facility] 
 
 
 [address] [facility] 
[phone] Patient: Kwaku Thakur MRN: [id number] :1965 Visit Information Date & Time Provider Department Dept. Phone Encounter #  
 2018  1:00 PM Janay Chaidez  [address] [phone] [id number] Follow-up Instructions Return if symptoms worsen or fail to improve. Your Appointments 2018  8:00 AM  
Any with Jesika Love NP  [facility] ([facility]) Appt Note: headache [place] [address] [facility] [id number] [phone]  
  
   
 [place] [address] [id number] [address] Upcoming Health Maintenance Date Due FOBT Q 1 YEAR AGE 50-75 2013 MEDICARE YEARLY EXAM 2018 Influenza Age 5 to Adult 2018 DTaP/Tdap/Td series (2 - Td) 2027 Allergies as of 2018  Review Complete On: 2018 By: Janay Chaidez NP Severity Noted Reaction Type Reactions Codeine High 2017    Anaphylaxis Pt has had both hydrocodone and oxycodone before Venom-honey Bee High 2015    Anaphylaxis Methadone  2015    Rash Neurontin [Gabapentin]  2015    Vertigo Penicillins  2015    Rash Pt. States he takes Keflex with no difficulties or adverse reactions Trilafon  2015    Other (comments) Tardive dyskinesia Current Immunizations  Never Reviewed Name Date Tdap 2017 Not reviewed this visit You Were Diagnosed With   
  
 Codes Comments Chronic pain of left knee    -  Primary ICD-10-CM: M25.562, J61.95 ICD-9-CM: 719.46, 338.29 Vitals BP Pulse Temp Resp Height(growth percentile) Weight(growth percentile)  130/90 (BP 1 Location: Right arm, BP Patient Position: Sitting) 82 97.8 °F (36.6 °C) (Oral) 18 5' 7\" (1.702 m) 188 lb 3.2 oz (85.4 kg) SpO2 BMI Smoking Status 97% 29.48 kg/m2 Current Every Day Smoker BMI and BSA Data Body Mass Index Body Surface Area  
 29.48 kg/m 2 2.01 m 2 Preferred Pharmacy Pharmacy Name Phone [place], [place] - [address]. [phone] Your Updated Medication List  
  
   
This list is accurate as of 5/23/18  1:16 PM.  Always use your most recent med list.  
  
  
  
  
 acetaminophen 500 mg tablet Commonly known as:  [address] Take 1-2 Tabs by mouth every six (6) hours as needed for Pain. Not to exceed 4,000mg in any 24 hour period  Indications: Pain  
  
 albuterol 90 mcg/actuation inhaler Commonly known as:  VENTOLIN HFA  
TAKE 1 PUFF BY INHALATION EVERY FOUR (4) HOURS AS NEEDED FOR WHEEZING. aspirin delayed-release 325 mg tablet Take 1 Tab by mouth two (2) times a day. cyclobenzaprine 10 mg tablet Commonly known as:  FLEXERIL Take 1 Tab by mouth three (3) times daily as needed for Muscle Spasm(s). diclofenac EC 75 mg EC tablet Commonly known as:  VOLTAREN  
TAKE 1 TABLET TWICE DAILY  FOR  OSTEOARTHRITIS  
  
 EPINEPHrine 0.3 mg/0.3 mL injection Commonly known as:  Maicol Yanez INJECT 0.3 ML INTRAMUSCULARLY ONCE AS NEEDED FOR UP TO ONE DOSE  
  
 fluticasone-vilanterol 200-25 mcg/dose inhaler Commonly known as:  BREO ELLIPTA Take 1 Puff by inhalation daily. furosemide 20 mg tablet Commonly known as:  LASIX Take 1 Tab by mouth daily. ondansetron 8 mg disintegrating tablet Commonly known as:  ZOFRAN ODT Take 0.5 Tabs by mouth every eight (8) hours as needed for Nausea. oxyCODONE IR 10 mg Tab immediate release tablet Commonly known as:  Verlan Sergo Take 1-1.5 Tabs by mouth every three (3) hours as needed. Max Daily Amount: 120 mg.  
  
 pantoprazole 40 mg tablet Commonly known as:  PROTONIX Take 1 Tab by mouth two (2) times a day. rizatriptan 10 mg disintegrating tablet Commonly known as:  MAXALT-MLT  
1 at HA onset and repeat in 2 hours if needed. Max 2 in 24 hours SUMAtriptan 100 mg tablet Commonly known as:  IMITREX Take 1 Tab by mouth once as needed for Migraine for up to 1 dose. topiramate 25 mg tablet Commonly known as:  TOPAMAX Take 3 tablets by mouth nightly  
  
 traMADol 50 mg tablet Commonly known as:  ULTRAM  
Take 1 Tab by mouth every six (6) hours as needed for Pain (Take for breakthrough pain if Oxycodone is not working). Max Daily Amount: 200 mg. Indications: Pain, Post-op Pain, Diagnosis Hip and Knee Arthritis ICD 10 - M16.9 We Performed the Following REFERRAL TO ORTHOPEDICS [DXG391 Custom] Follow-up Instructions Return if symptoms worsen or fail to improve. To-Do List   
 06/27/2018 9:30 AM  
  Appointment with Ana Cameron at [address] [facility] ([phone]) Referral Information Referral ID Referred By Referred To  
  
 [id number] [place] [address] [address] Phone: [phone] Fax: [phone] Visits Status Start Date End Date 1 New Request 5/23/18 5/23/19 If your referral has a status of pending review or denied, additional information will be sent to support the outcome of this decision. Patient Instructions Knee Pain or Injury: Care Instructions Your Care Instructions Injuries are a common cause of knee problems. Sudden (acute) injuries may be caused by a direct blow to the knee. They can also be caused by abnormal twisting, bending, or falling on the knee. Pain, bruising, or swelling may be severe, and may start within minutes of the injury. Overuse is another cause of knee pain.  Other causes are climbing stairs, kneeling, and other activities that use the knee. Everyday wear and tear, especially as you get older, also can cause knee pain. Rest, along with home treatment, often relieves pain and allows your knee to heal. If you have a serious knee injury, you may need tests and treatment. Follow-up care is a key part of your treatment and safety. Be sure to make and go to all appointments, and call your doctor if you are having problems. It's also a good idea to know your test results and keep a list of the medicines you take. How can you care for yourself at home? · Be safe with medicines. Read and follow all instructions on the label. ¨ If the doctor gave you a prescription medicine for pain, take it as prescribed. ¨ If you are not taking a prescription pain medicine, ask your doctor if you can take an over-the-counter medicine. · Rest and protect your knee. Take a break from any activity that may cause pain. · Put ice or a cold pack on your knee for 10 to 20 minutes at a time. Put a thin cloth between the ice and your skin. · Prop up a sore knee on a pillow when you ice it or anytime you sit or lie down for the next 3 days. Try to keep it above the level of your heart. This will help reduce swelling. · If your knee is not swollen, you can put moist heat, a heating pad, or a warm cloth on your knee. · If your doctor recommends an elastic bandage, sleeve, or other type of support for your knee, wear it as directed. · Follow your doctor's instructions about how much weight you can put on your leg. Use a cane, crutches, or a walker as instructed. · Follow your doctor's instructions about activity during your healing process. If you can do mild exercise, slowly increase your activity. · Reach and stay at a healthy weight. Extra weight can strain the joints, especially the knees and hips, and make the pain worse. Losing even a few pounds may help. When should you call for help? Call 911 anytime you think you may need emergency care. For example, call if: 
? · You have symptoms of a blood clot in your lung (called a pulmonary embolism). These may include: 
¨ Sudden chest pain. ¨ Trouble breathing. ¨ Coughing up blood. ?Call your doctor now or seek immediate medical care if: 
? · You have severe or increasing pain. ? · Your leg or foot turns cold or changes color. ? · You cannot stand or put weight on your knee. ? · Your knee looks twisted or bent out of shape. ? · You cannot move your knee. ? · You have signs of infection, such as: 
¨ Increased pain, swelling, warmth, or redness. ¨ Red streaks leading from the knee. ¨ Pus draining from a place on your knee. ¨ A fever. ? · You have signs of a blood clot in your leg (called a deep vein thrombosis), such as: 
¨ Pain in your calf, back of the knee, thigh, or groin. ¨ Redness and swelling in your leg or groin. ? Watch closely for changes in your health, and be sure to contact your doctor if: 
? · You have tingling, weakness, or numbness in your knee. ? · You have any new symptoms, such as swelling. ? · You have bruises from a knee injury that last longer than 2 weeks. ? · You do not get better as expected. Where can you learn more? Go to http://oscar-keely.info/. Enter K195 in the search box to learn more about \"Knee Pain or Injury: Care Instructions. \" Current as of: March 20, 2017 Content Version: 11.4 © 1758-5493 Mapflow. Care instructions adapted under license by [employer] (which disclaims liability or warranty for this information). If you have questions about a medical condition or this instruction, always ask your healthcare professional. [address] any warranty or liability for your use of this information. Introducing [facility] HE[facility] SERVICES! Dear Digna Nava: 
Thank you for requesting a Sook[employer] account.   Our records indicate that you already have an active [employer] account. You can access your account anytime at [email or web address] Did you know that you can access your hospital and ER discharge instructions at any time in Club W? You can also review all of your test results from your hospital stay or ER visit. Additional Information If you have questions, please visit the Frequently Asked Questions section of the [employer] website at [email or web address] Remember, Club W is NOT to be used for urgent needs. For medical emergencies, dial 911. Now available from your iPhone and Android! Please provide this summary of care documentation to your next provider. Your primary care clinician is listed as Sarika Carmona. If you have any questions after today's visit, please call [phone].

## 2018-05-29 DIAGNOSIS — J45.20 MILD INTERMITTENT ASTHMA WITHOUT COMPLICATION: ICD-10-CM

## 2018-05-29 DIAGNOSIS — R51.9 FREQUENT HEADACHES: ICD-10-CM

## 2018-05-30 RX ORDER — FLUTICASONE FUROATE AND VILANTEROL TRIFENATATE 200; 25 UG/1; UG/1
POWDER RESPIRATORY (INHALATION)
Qty: 1 INHALER | Refills: 5 | Status: SHIPPED | OUTPATIENT
Start: 2018-05-30

## 2018-05-30 RX ORDER — ALBUTEROL SULFATE 90 UG/1
AEROSOL, METERED RESPIRATORY (INHALATION)
Qty: 3 INHALER | Refills: 2 | Status: SHIPPED | OUTPATIENT
Start: 2018-05-30

## 2018-06-05 ENCOUNTER — OFFICE VISIT (OUTPATIENT)
Dept: NEUROLOGY | Age: 53
End: 2018-06-05

## 2018-06-05 VITALS
SYSTOLIC BLOOD PRESSURE: 124 MMHG | DIASTOLIC BLOOD PRESSURE: 88 MMHG | WEIGHT: 188 LBS | BODY MASS INDEX: 29.51 KG/M2 | HEIGHT: 67 IN

## 2018-06-05 DIAGNOSIS — G43.909 MIGRAINE WITHOUT STATUS MIGRAINOSUS, NOT INTRACTABLE, UNSPECIFIED MIGRAINE TYPE: Primary | ICD-10-CM

## 2018-06-05 RX ORDER — CYCLOBENZAPRINE HCL 10 MG
10 TABLET ORAL 2 TIMES DAILY
Qty: 60 TAB | Refills: 5 | Status: SHIPPED | OUTPATIENT
Start: 2018-06-05

## 2018-06-05 NOTE — MR AVS SNAPSHOT
[address] [facility] [address] [place] [address] [address] [facility] 99 [id number] [phone] Patient: Mago Tong MRN: [id number] :1965 Visit Information Date & Time Provider Department Dept. Phone Encounter #  
 2018  3:30 PM Neisha Dotson NP [facility] Neurology [facility] [phone] [id number] Follow-up Instructions Return in about 3 months (around 2018). Upcoming Health Maintenance Date Due FOBT Q 1 YEAR AGE 50-75 2013 MEDICARE YEARLY EXAM 2018 Influenza Age 5 to Adult 2018 DTaP/Tdap/Td series (2 - Td) 2027 Allergies as of 2018  Review Complete On: 2018 By: Lavonne Mtz Severity Noted Reaction Type Reactions Codeine High 2017    Anaphylaxis Pt has had both hydrocodone and oxycodone before Venom-honey Bee High 2015    Anaphylaxis Indomethacin  2018    Rash, Itching Methadone  2015    Rash Neurontin [Gabapentin]  2015    Vertigo Penicillins  2015    Rash Pt. States he takes Keflex with no difficulties or adverse reactions Trilafon  2015    Other (comments) Tardive dyskinesia Current Immunizations  Never Reviewed Name Date Tdap 2017 Not reviewed this visit You Were Diagnosed With   
  
 Codes Comments Migraine without status migrainosus, not intractable, unspecified migraine type    -  Primary ICD-10-CM: F04.052 ICD-9-CM: 346.90 Vitals BP Height(growth percentile) Weight(growth percentile) BMI Smoking Status 124/88 5' 7\" (1.702 m) 188 lb (85.3 kg) 29.44 kg/m2 Current Every Day Smoker BMI and BSA Data Body Mass Index Body Surface Area  
 29.44 kg/m 2 2.01 m 2 Preferred Pharmacy Pharmacy Name Phone [place] - [address] [phone] Your Updated Medication List  
  
   
 This list is accurate as of 6/5/18  4:08 PM.  Always use your most recent med list.  
  
  
  
  
 BREO ELLIPTA 200-25 mcg/dose inhaler Generic drug:  fluticasone-vilanterol INHALE 1 PUFF EVERY DAY  
  
 cyclobenzaprine 10 mg tablet Commonly known as:  FLEXERIL Take 1 Tab by mouth two (2) times a day. PRN  
  
 diclofenac EC 75 mg EC tablet Commonly known as:  VOLTAREN  
TAKE 1 TABLET TWICE DAILY  FOR  OSTEOARTHRITIS  
  
 EPINEPHrine 0.3 mg/0.3 mL injection Commonly known as:  Herschell Hirschfeld INJECT 0.3 ML INTRAMUSCULARLY ONCE AS NEEDED FOR UP TO ONE DOSE  
  
 furosemide 20 mg tablet Commonly known as:  LASIX Take 1 Tab by mouth daily. pantoprazole 40 mg tablet Commonly known as:  PROTONIX Take 1 Tab by mouth two (2) times a day. rizatriptan 10 mg disintegrating tablet Commonly known as:  MAXALT-MLT  
1 at HA onset and repeat in 2 hours if needed. Max 2 in 24 hours  
  
 topiramate 25 mg tablet Commonly known as:  TOPAMAX Take 3 tablets by mouth nightly VENTOLIN HFA 90 mcg/actuation inhaler Generic drug:  albuterol INHALE  1 PUFF EVERY FOUR (4) HOURS AS NEEDED FOR WHEEZING. Prescriptions Sent to Pharmacy Refills  
 cyclobenzaprine (FLEXERIL) 10 mg tablet 5 Sig: Take 1 Tab by mouth two (2) times a day. PRN Class: Normal  
 Pharmacy: [address] [place] [address] #: [phone] Route: Oral  
  
Follow-up Instructions Return in about 3 months (around 9/5/2018). To-Do List   
 06/27/2018 9:30 AM  
  Appointment with Susana Mendez at [address] [facility] ([phone]) Patient Instructions PRESCRIPTION REFILL POLICY [facility] Statement to Patients April 1, 2014 In an effort to ensure the large volume of patient prescription refills is processed in the most efficient and expeditious manner, we are asking our patients to assist us by calling your Pharmacy for all prescription refills, this will include also your  Mail Order Pharmacy. The pharmacy will contact our office electronically to continue the refill process. Please do not wait until the last minute to call your pharmacy. We need at least 48 hours (2days) to fill prescriptions. We also encourage you to call your pharmacy before going to  your prescription to make sure it is ready. With regard to controlled substance prescription refill requests (narcotic refills) that need to be picked up at our office, we ask your cooperation by providing us with at least 72 hours (3days) notice that you will need a refill. We will not refill narcotic prescription refill requests after 4:00pm on any weekday, Monday through Thursday, or after 2:00pm on Fridays, or on the weekends. We encourage everyone to explore another way of getting your prescription refill request processed using [employer], our patient web portal through our electronic medical record system. [employer] is an efficient and effective way to communicate your medication request directly to the office and  downloadable as an naren on your smart phone . [employer] also features a review functionality that allows you to view your medication list as well as leave messages for your physician. Are you ready to get connected? If so please review the attatched instructions or speak to any of our staff to get you set up right away! Thank you so much for your cooperation. Should you have any questions please contact our Practice Administrator. The Physicians and Staff,  [facility] Introducing [facility] SERVICES! Dear Isabel Gambino: 
Thank you for requesting a [employer] account. Our records indicate that you already have an active [employer] account. You can access your account anytime at [email or web address] Did you know that you can access your hospital and ER discharge instructions at any time in [employer]? You can also review all of your test results from your hospital stay or ER visit. Additional Information If you have questions, please visit the Frequently Asked Questions section of the [employer] website at [email or web address] Remember, [employer] is NOT to be used for urgent needs. For medical emergencies, dial 911. Now available from your iPhone and Android! Please provide this summary of care documentation to your next provider. Your primary care clinician is listed as Sarika Carmona. If you have any questions after today's visit, please call [phone].

## 2018-06-05 NOTE — PATIENT INSTRUCTIONS
10 [facility]   Statement to Patients  April 1, 2014      In an effort to ensure the large volume of patient prescription refills is processed in the most efficient and expeditious manner, we are asking our patients to assist us by calling your Pharmacy for all prescription refills, this will include also your  Mail Order Pharmacy. The pharmacy will contact our office electronically to continue the refill process. Please do not wait until the last minute to call your pharmacy. We need at least 48 hours (2days) to fill prescriptions. We also encourage you to call your pharmacy before going to  your prescription to make sure it is ready. With regard to controlled substance prescription refill requests (narcotic refills) that need to be picked up at our office, we ask your cooperation by providing us with at least 72 hours (3days) notice that you will need a refill. We will not refill narcotic prescription refill requests after 4:00pm on any weekday, Monday through Thursday, or after 2:00pm on Fridays, or on the weekends. We encourage everyone to explore another way of getting your prescription refill request processed using [employer], our patient web portal through our electronic medical record system. [employer] is an efficient and effective way to communicate your medication request directly to the office and  downloadable as an naren on your smart phone . [employer] also features a review functionality that allows you to view your medication list as well as leave messages for your physician. Are you ready to get connected? If so please review the attatched instructions or speak to any of our staff to get you set up right away! Thank you so much for your cooperation. Should you have any questions please contact our Practice Administrator.     The Physicians and Staff,  [facility]
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
## 2018-06-22 ENCOUNTER — HOSPITAL ENCOUNTER (OUTPATIENT)
Dept: CT IMAGING | Age: 53
Discharge: HOME | End: 2018-06-22
Attending: ORTHOPAEDIC SURGERY
Payer: MEDICARE

## 2018-06-22 DIAGNOSIS — Z96.652 PRESENCE OF LEFT ARTIFICIAL KNEE JOINT: ICD-10-CM

## 2018-06-22 DIAGNOSIS — M25.562 LEFT KNEE PAIN: ICD-10-CM

## 2018-06-22 PROCEDURE — 73700 CT LOWER EXTREMITY W/O DYE: CPT

## 2018-06-26 ENCOUNTER — OFFICE VISIT (OUTPATIENT)
Dept: FAMILY MEDICINE CLINIC | Age: 53
End: 2018-06-26

## 2018-06-26 VITALS
HEART RATE: 76 BPM | SYSTOLIC BLOOD PRESSURE: 138 MMHG | DIASTOLIC BLOOD PRESSURE: 78 MMHG | WEIGHT: 192 LBS | OXYGEN SATURATION: 99 % | BODY MASS INDEX: 30.13 KG/M2 | TEMPERATURE: 98 F | RESPIRATION RATE: 16 BRPM | HEIGHT: 67 IN

## 2018-06-26 DIAGNOSIS — Z12.5 SCREENING FOR PROSTATE CANCER: ICD-10-CM

## 2018-06-26 DIAGNOSIS — Z12.11 SCREENING FOR COLON CANCER: ICD-10-CM

## 2018-06-26 DIAGNOSIS — Z00.00 MEDICARE ANNUAL WELLNESS VISIT, SUBSEQUENT: Primary | ICD-10-CM

## 2018-06-26 DIAGNOSIS — Z13.220 SCREENING FOR CHOLESTEROL LEVEL: ICD-10-CM

## 2018-06-26 RX ORDER — FLUTICASONE FUROATE AND VILANTEROL 200; 25 UG/1; UG/1
POWDER RESPIRATORY (INHALATION)
COMMUNITY
Start: 2018-01-16 | End: 2018-06-26 | Stop reason: SDUPTHER

## 2018-06-26 RX ORDER — DICLOFENAC SODIUM 75 MG/1
TABLET, DELAYED RELEASE ORAL
COMMUNITY
Start: 2018-05-22 | End: 2018-06-26 | Stop reason: SDUPTHER

## 2018-06-26 RX ORDER — OXYCODONE HYDROCHLORIDE 5 MG/1
5 TABLET ORAL
COMMUNITY
Start: 2018-04-04 | End: 2018-07-19 | Stop reason: ALTCHOICE

## 2018-06-26 NOTE — PATIENT INSTRUCTIONS
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
## 2018-06-26 NOTE — MR AVS SNAPSHOT
[facility] 
 
 
 [address] [facility] 
[phone] Patient: Alfa De Jesus MRN: [id number] :1965 Visit Information Date & Time Provider Department Dept. Phone Encounter #  
 2018  8:00 AM Sid Curran  [address] [phone] [id number] Follow-up Instructions Return if symptoms worsen or fail to improve. Your Appointments 2018  3:30 PM  
Any with Mary Banks NP [facility] ([address]) Appt Note: 3 month f/u headache leathw [place] [address] [facility]  [address] [facility] [address] [phone]  
  
   
 [place] [address] [id number] [address] Upcoming Health Maintenance Date Due FOBT Q 1 YEAR AGE 50-75 2013 MEDICARE YEARLY EXAM 2018 Influenza Age 5 to Adult 2018 DTaP/Tdap/Td series (2 - Td) 2027 Allergies as of 2018  Review Complete On: 2018 By: Sid Curran NP Severity Noted Reaction Type Reactions Codeine High 2017    Anaphylaxis Pt has had both hydrocodone and oxycodone before Venom-honey Bee High 2015    Anaphylaxis Indomethacin  2018    Rash, Itching Methadone  2015    Rash Neurontin [Gabapentin]  2015    Vertigo Penicillins  2015    Rash Pt. States he takes Keflex with no difficulties or adverse reactions Trilafon  2015    Other (comments) Tardive dyskinesia Current Immunizations  Never Reviewed Name Date Tdap 2017 Not reviewed this visit You Were Diagnosed With   
  
 Codes Comments Medicare annual wellness visit, subsequent    -  Primary ICD-10-CM: Z00.00 ICD-9-CM: V70.0 Screening for colon cancer     ICD-10-CM: Z12.11 ICD-9-CM: V76.51 Screening for cholesterol level     ICD-10-CM: Z13.220 ICD-9-CM: V77.91   
 Screening for prostate cancer     ICD-10-CM: Z12.5 ICD-9-CM: V76.44 Vitals BP Pulse Temp Resp Height(growth percentile) Weight(growth percentile) 138/78 76 98 °F (36.7 °C) (Oral) 16 5' 7\" (1.702 m) 192 lb (87.1 kg) SpO2 BMI Smoking Status 99% 30.07 kg/m2 Current Every Day Smoker Vitals History BMI and BSA Data Body Mass Index Body Surface Area 30.07 kg/m 2 2.03 m 2 Preferred Pharmacy Pharmacy Name Phone [place], [place] - [facility] [phone]. [phone] Your Updated Medication List  
  
   
This list is accurate as of 6/26/18  8:27 AM.  Always use your most recent med list.  
  
  
  
  
 BREO ELLIPTA 200-25 mcg/dose inhaler Generic drug:  fluticasone-vilanterol INHALE 1 PUFF EVERY DAY  
  
 cyclobenzaprine 10 mg tablet Commonly known as:  FLEXERIL Take 1 Tab by mouth two (2) times a day. PRN  
  
 diclofenac EC 75 mg EC tablet Commonly known as:  VOLTAREN  
TAKE 1 TABLET TWICE DAILY  FOR  OSTEOARTHRITIS  
  
 EPINEPHrine 0.3 mg/0.3 mL injection Commonly known as:  Renelda [place] INJECT 0.3 ML INTRAMUSCULARLY ONCE AS NEEDED FOR UP TO ONE DOSE  
  
 furosemide 20 mg tablet Commonly known as:  LASIX Take 1 Tab by mouth daily. oxyCODONE IR 5 mg immediate release tablet Commonly known as:  Twylla [place] Take 5 mg by mouth.  
  
 pantoprazole 40 mg tablet Commonly known as:  PROTONIX Take 1 Tab by mouth two (2) times a day. rizatriptan 10 mg disintegrating tablet Commonly known as:  MAXALT-MLT  
1 at HA onset and repeat in 2 hours if needed. Max 2 in 24 hours  
  
 topiramate 25 mg tablet Commonly known as:  TOPAMAX Take 3 tablets by mouth nightly VENTOLIN HFA 90 mcg/actuation inhaler Generic drug:  albuterol INHALE  1 PUFF EVERY FOUR (4) HOURS AS NEEDED FOR WHEEZING. We Performed the Following CBC W/O DIFF [42819 CPT(R)] LIPID PANEL [16689 CPT(R)] METABOLIC PANEL, COMPREHENSIVE [11956 CPT(R)] OCCULT BLOOD, IMMUNOASSAY (FIT) [id number] CPT(R)] PSA SCREENING (SCREENING) [ [facility]] REFERRAL TO GASTROENTEROLOGY [ROJ10 Custom] Follow-up Instructions Return if symptoms worsen or fail to improve. To-Do List   
 06/27/2018 9:30 AM  
  Appointment with Wyatt Hilton at [address] [facility] ([phone]) Referral Information Referral ID Referred By Referred To  
  
 [id number] Shaquillecem Liao, [address] [facility]   
   [address]  [place], [address] [facility] Visits Status Start Date End Date 1 New Request 6/26/18 6/26/19 If your referral has a status of pending review or denied, additional information will be sent to support the outcome of this decision. Patient Instructions Well Visit, Men 48 to 72: Care Instructions Your Care Instructions Physical exams can help you stay healthy. Your doctor has checked your overall health and may have suggested ways to take good care of yourself. He or she also may have recommended tests. At home, you can help prevent illness with healthy eating, regular exercise, and other steps. Follow-up care is a key part of your treatment and safety. Be sure to make and go to all appointments, and call your doctor if you are having problems. It's also a good idea to know your test results and keep a list of the medicines you take. How can you care for yourself at home? · Reach and stay at a healthy weight. This will lower your risk for many problems, such as obesity, diabetes, heart disease, and high blood pressure. · Get at least 30 minutes of exercise on most days of the week. Walking is a good choice. You also may want to do other activities, such as running, swimming, cycling, or playing tennis or team sports. · Do not smoke. Smoking can make health problems worse.  If you need help quitting, talk to your doctor about stop-smoking programs and medicines. These can increase your chances of quitting for good. · Protect your skin from too much sun. When you're outdoors from 10 a.m. to 4 p.m., stay in the shade or cover up with clothing and a hat with a wide brim. Wear sunglasses that block UV rays. Even when it's cloudy, put broad-spectrum sunscreen (SPF 30 or higher) on any exposed skin. · See a dentist one or two times a year for checkups and to have your teeth cleaned. · Wear a seat belt in the car. · Limit alcohol to 2 drinks a day. Too much alcohol can cause health problems. Follow your doctor's advice about when to have certain tests. These tests can spot problems early. · Cholesterol. Your doctor will tell you how often to have this done based on your overall health and other things that can increase your risk for heart attack and stroke. · Blood pressure. Have your blood pressure checked during a routine doctor visit. Your doctor will tell you how often to check your blood pressure based on your age, your blood pressure results, and other factors. · Prostate exam. Talk to your doctor about whether you should have a blood test (called a PSA test) for prostate cancer. Experts disagree on whether men should have this test. Some experts recommend that you discuss the benefits and risks of the test with your doctor. · Diabetes. Ask your doctor whether you should have tests for diabetes. · Vision. Some experts recommend that you have yearly exams for glaucoma and other age-related eye problems starting at age 48. · Hearing. Tell your doctor if you notice any change in your hearing. You can have tests to find out how well you hear. · Colon cancer. You should begin tests for colon cancer at age 48. You may have one of several tests. Your doctor will tell you how often to have tests based on your age and risk.  Risks include whether you already had a precancerous polyp removed from your colon or whether your parent, brother, sister, or child has had colon cancer. · Heart attack and stroke risk. At least every 4 to 6 years, you should have your risk for heart attack and stroke assessed. Your doctor uses factors such as your age, blood pressure, cholesterol, and whether you smoke or have diabetes to show what your risk for a heart attack or stroke is over the next 10 years. · Abdominal aortic aneurysm. Ask your doctor whether you should have a test to check for an aneurysm. You may need a test if you ever smoked or if your parent, brother, sister, or child has had an aneurysm. When should you call for help? Watch closely for changes in your health, and be sure to contact your doctor if you have any problems or symptoms that concern you. Where can you learn more? Go to http://oscar-keely.info/. Enter B319 in the search box to learn more about \"Well Visit, Men 48 to 72: Care Instructions. \" Current as of: May 12, 2017 Content Version: 11.4 © 3657-4752 [employer]. Care instructions adapted under license by [employer] (which disclaims liability or warranty for this information). If you have questions about a medical condition or this instruction, always ask your healthcare professional. [place] [address] any warranty or liability for your use of this information. Introducing [facility]! Dear Malia Landers: 
Thank you for requesting a opvizor account. Our records indicate that you already have an active opvizor account. You can access your account anytime at [email or web address]. [email or web address] Did you know that you can access your hospital and ER discharge instructions at any time in opvizor? You can also review all of your test results from your hospital stay or ER visit. Additional Information If you have questions, please visit the Frequently Asked Questions section of the [facility] website at [email or web address] Remember, [facility] is NOT to be used for urgent needs. For medical emergencies, dial 911. Now available from your iPhone and Android! Please provide this summary of care documentation to your next provider. Your primary care clinician is listed as Sarika Carmona. If you have any questions after today's visit, please call [phone].

## 2018-06-26 NOTE — PROGRESS NOTES
Chief Complaint   Patient presents with   [facility] Annual Wellness Visit     \"REVIEWED RECORD IN PREPARATION FOR VISIT AND HAVE OBTAINED THE NECESSARY DOCUMENTATION\"  1. Have you been to the ER, urgent care clinic since your last visit? Hospitalized since your last visit? No    2. Have you seen or consulted any other health care providers outside of the [address] [place] [address] since your last visit? Include any pap smears or colon screening. No  Patient does not have advanced directives.

## 2018-06-27 ENCOUNTER — TELEPHONE (OUTPATIENT)
Dept: FAMILY MEDICINE CLINIC | Age: 53
End: 2018-06-27

## 2018-06-27 ENCOUNTER — HOSPITAL ENCOUNTER (OUTPATIENT)
Dept: PHYSICAL THERAPY | Age: 53
Discharge: HOME | End: 2018-06-27
Payer: MEDICARE

## 2018-06-27 LAB
ALBUMIN SERPL-MCNC: 4.3 G/DL (ref 3.5–5.5)
ALBUMIN/GLOB SERPL: 1.5 {RATIO} (ref 1.2–2.2)
ALP SERPL-CCNC: 105 IU/L (ref 39–117)
ALT SERPL-CCNC: 16 IU/L (ref 0–44)
AST SERPL-CCNC: 18 IU/L (ref 0–40)
BILIRUB SERPL-MCNC: 0.4 MG/DL (ref 0–1.2)
BUN SERPL-MCNC: 11 MG/DL (ref 6–24)
BUN/CREAT SERPL: 13 (ref 9–20)
CALCIUM SERPL-MCNC: 10.4 MG/DL (ref 8.7–10.2)
CHLORIDE SERPL-SCNC: 102 MMOL/L (ref 96–106)
CHOLEST SERPL-MCNC: 210 MG/DL (ref 100–199)
CO2 SERPL-SCNC: 23 MMOL/L (ref 20–29)
CREAT SERPL-MCNC: 0.83 MG/DL (ref 0.76–1.27)
ERYTHROCYTE [DISTWIDTH] IN BLOOD BY AUTOMATED COUNT: 16.2 % (ref 12.3–15.4)
GLOBULIN SER CALC-MCNC: 2.9 G/DL (ref 1.5–4.5)
GLUCOSE SERPL-MCNC: 88 MG/DL (ref 65–99)
HCT VFR BLD AUTO: 47.8 % (ref 37.5–51)
HDLC SERPL-MCNC: 44 MG/DL
HGB BLD-MCNC: 15.9 G/DL (ref 13–17.7)
INTERPRETATION, 910389: NORMAL
LDLC SERPL CALC-MCNC: 144 MG/DL (ref 0–99)
MCH RBC QN AUTO: 29.9 PG (ref 26.6–33)
MCHC RBC AUTO-ENTMCNC: 33.3 G/DL (ref 31.5–35.7)
MCV RBC AUTO: 90 FL (ref 79–97)
PLATELET # BLD AUTO: 360 X10E3/UL (ref 150–379)
POTASSIUM SERPL-SCNC: 5.1 MMOL/L (ref 3.5–5.2)
PROT SERPL-MCNC: 7.2 G/DL (ref 6–8.5)
PSA SERPL-MCNC: 0.8 NG/ML (ref 0–4)
RBC # BLD AUTO: 5.31 X10E6/UL (ref 4.14–5.8)
SODIUM SERPL-SCNC: 139 MMOL/L (ref 134–144)
TRIGL SERPL-MCNC: 111 MG/DL (ref 0–149)
VLDLC SERPL CALC-MCNC: 22 MG/DL (ref 5–40)
WBC # BLD AUTO: 13.2 X10E3/UL (ref 3.4–10.8)

## 2018-06-27 PROCEDURE — 97140 MANUAL THERAPY 1/> REGIONS: CPT

## 2018-06-27 PROCEDURE — 97161 PT EVAL LOW COMPLEX 20 MIN: CPT
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
## 2018-06-27 NOTE — TELEPHONE ENCOUNTER
Its noted that pt is coming tomorrow on ELIAN Carmona NP schedule and results will be reviewed at office visit.

## 2018-06-27 NOTE — TELEPHONE ENCOUNTER
----- Message from Afia Pham NP sent at 6/27/2018  9:41 AM EDT -----  Please call patient and let him know that his labs returned:  1.  WBC's are elevated. This could correlate with potential infection in knee, that is being worked up via ortho. I would inform patient to continue with follow up with them in regards to this. 2.  Cholesterol is high. Would he be willing to try low dose statin? Let me know  Thanks!

## 2018-06-27 NOTE — PROGRESS NOTES
Please call patient and let him know that his labs returned:  1.  WBC's are elevated. This could correlate with potential infection in knee, that is being worked up via ortho. I would inform patient to continue with follow up with them in regards to this. 2.  Cholesterol is high. Would he be willing to try low dose statin? Let me know  Thanks!

## 2018-06-28 ENCOUNTER — OFFICE VISIT (OUTPATIENT)
Dept: FAMILY MEDICINE CLINIC | Age: 53
End: 2018-06-28

## 2018-06-28 VITALS
DIASTOLIC BLOOD PRESSURE: 88 MMHG | HEART RATE: 94 BPM | WEIGHT: 190 LBS | OXYGEN SATURATION: 94 % | HEIGHT: 67 IN | SYSTOLIC BLOOD PRESSURE: 130 MMHG | BODY MASS INDEX: 29.82 KG/M2

## 2018-06-28 VITALS
OXYGEN SATURATION: 97 % | TEMPERATURE: 97.2 F | SYSTOLIC BLOOD PRESSURE: 130 MMHG | DIASTOLIC BLOOD PRESSURE: 75 MMHG | BODY MASS INDEX: 30.13 KG/M2 | WEIGHT: 192 LBS | HEART RATE: 83 BPM | HEIGHT: 67 IN | RESPIRATION RATE: 18 BRPM

## 2018-06-28 DIAGNOSIS — E78.00 PURE HYPERCHOLESTEROLEMIA: Primary | ICD-10-CM

## 2018-06-28 DIAGNOSIS — D72.829 LEUKOCYTOSIS, UNSPECIFIED TYPE: ICD-10-CM

## 2018-06-28 NOTE — PROGRESS NOTES
Chief Complaint   Patient presents with    Labs     pt is here to discuss his recent labs     \"REVIEWED RECORD IN PREPARATION FOR VISIT AND HAVE OBTAINED THE NECESSARY DOCUMENTATION\"  1. Have you been to the ER, urgent care clinic since your last visit? Hospitalized since your last visit? No    2. Have you seen or consulted any other health care providers outside of the [address] [place] [address] since your last visit? Include any pap smears or colon screening. No  Patient does not have advanced directives.

## 2018-06-28 NOTE — PROGRESS NOTES
HISTORY OF PRESENT ILLNESS  Giovana Lomax is a 52 y.o. male. HPI  Pt presents with \"wanting to discuss labs\"    Pt would like to discuss his most recent labs that were performed. He has an appointment to follow up with Ortho on July 11th, in regards to possible infection in left knee. He had fluid pulled off the knee about a week ago, and they are testing this for infection. Pt has no other concerns or complaints at this time. Review of Systems   Constitutional: Negative for fever. HENT: Negative for congestion. Respiratory: Negative for cough. Gastrointestinal: Negative for diarrhea and vomiting. Physical Exam   Constitutional: He is oriented to person, place, and time. He appears well-developed and well-nourished. HENT:   Head: Normocephalic and atraumatic. Cardiovascular: Normal rate, regular rhythm and normal heart sounds. Pulmonary/Chest: Effort normal and breath sounds normal.   Neurological: He is alert and oriented to person, place, and time. Skin: Skin is warm and dry. Psychiatric: He has a normal mood and affect. His behavior is normal.       ASSESSMENT and PLAN    ICD-10-CM ICD-9-CM    1. Pure hypercholesterolemia E78.00 272.0    2. Leukocytosis, unspecified type D72.829 288.60      Educated patient about high cholesterol, and risks of uncontrolled cholesterol such as heart attack, stroke, etc.  Educated about diet changes such as the mediterranean diet. Pt would like to try dietary changes, prior to starting medication, as he states that he has been eating horribly recently. Will re-check in 6 months. Educated about notifying ortho about elevated WBC's, for further assessment. Should be re-checked in 1 month. Pt informed to return to office with worsening of symptoms, or PRN with any questions or concerns. Pt verbalizes understanding of plan of care and denies further questions or concerns at this time.

## 2018-07-10 DIAGNOSIS — Z88.9 MULTIPLE ALLERGIES: ICD-10-CM

## 2018-07-10 RX ORDER — EPINEPHRINE 0.3 MG/.3ML
INJECTION SUBCUTANEOUS
Qty: 2 SYRINGE | Refills: 1 | Status: SHIPPED | OUTPATIENT
Start: 2018-07-10

## 2018-07-13 RX ORDER — RIZATRIPTAN BENZOATE 10 MG/1
TABLET, ORALLY DISINTEGRATING ORAL
Qty: 27 TAB | Refills: 1 | Status: SHIPPED | OUTPATIENT
Start: 2018-07-13

## 2018-07-13 NOTE — TELEPHONE ENCOUNTER
----- Message from [facility] sent at 7/13/2018 11:41 AM EDT -----  Regarding: LINDSAY O Malick/Telephone  Pt is requesting a renewal prescription and [employer] has been trying to contact the office as well.   Pt best contact [phone]

## 2018-07-17 ENCOUNTER — HOSPITAL ENCOUNTER (OUTPATIENT)
Dept: PHYSICAL THERAPY | Age: 53
Discharge: HOME | End: 2018-07-17
Payer: MEDICARE

## 2018-07-17 PROCEDURE — 97760 ORTHOTIC MGMT&TRAING 1ST ENC: CPT

## 2018-07-17 PROCEDURE — 97140 MANUAL THERAPY 1/> REGIONS: CPT

## 2018-07-17 NOTE — PROGRESS NOTES
_                                    [facility]  [address] [place] [address]  [address] [place] [address],[address]  Torin Toro      LYmphedema Therapy  Visit: 2    [x]        Daily note               []       30 day/10th visit progress note    NAME: Yuki Lemus  DATE: 7/17/2018    Goals:  1. Instruct the patient to be independent with proper skin care to prevent future skin breakdown and decrease the potential risk for infections that are associated with Lymphedema. 2. Patient will be independent with a personal lymphedema exercise program to assist with the lymphatic flow and reduce limb volume. 7/17/2018 goal met. 3. Patient will understand the signs and symptoms of acute infection. 7/17/2018 goal met.     4.   Patient will have knowledge of the compression options and acquire a safe and                                 appropriate daytime and night time compression system to prevent                                                 re-accumulation of fluid. 7/17/2018 [address]62 silver soft thigh high compression garments fit in clinic today with good initial fit noted. 5.  Patient or family will be able to don/doff garments independently. Garment                                 system effectiveness will be evaluated prior to discharge for better long-term                                 management and outcomes. 7/17/2018  Patient able to don/doff compression garments in clinic today with donning gloves. 6.   To transition patient to independent restorative phase of CDT.          SUBJECTIVE REPORT:  Patient to clinic today with compression stockings, with plan to fit in clinic today. Pt reports he had L knee joint aspirated again, awaiting results of culture. States he has been working on increasing L knee flexion, s/p TKR. Pain: 7/10; L knee            Gait:  Indep, L knee brace, lateral/medial support.   ADLs:  indep  Treatment Response:  Good initial fit of compression stockings. Reviewed packet received at evaluation. Function: see evaluation    TREATMENT AND OBJECTIVE DATA SUMMARY:       Therapeutic Exercise/Procedure      Home program: Patient to perform daily to BID skin care, deep abdominal breathing, exercise routine per TKR protocol, with patient to perform exercises with compression stockings donned. Rationale: Exercise will increase the lymph angiomotoricity and tissue pressure of the skin and thus decrease swelling. Manual therapy: 10-10:28 am  Orthotic management: 10:29-10:55 am 28 minutes  26 minutes     Area to decongest: Bilateral LE   Manual therapy Patient instructed to continue lotion application bilateral LE using upward strokes, to perform at bedtime. Pt to discontinue with onset of s/s of infection as noted below. Limb volume measurements completed today with elevated volumes noted bilateral LE, with R LE measuring 8311. 92 mL, L LE measuring 8707. 42 mL. Limb volume discrepancy of 4.76% noted, increased from previous visit. Skin/wound care/debridement: Skin intact. L knee incision closed. Orthotic management: Therapist fit [employer] silver soft thigh high compression garments, with good initial fit noted. Pt instructed in appropriate don/doff of garments using donning gloves to improve  with donning garments, and to reduce risk of damaging garments. Pt demonstrates indep don/doff of garments, with appropriate placement of silicone border proximal extremity. Pt instructed in the following regarding home management of compression stockings. Compression garment routine:  1. Apply daytime compression stocking to clean, dry extremity first thing in the morning, EVERY MORNING. 2. Wear compression garments until bedtime, adjusting throughout the day as needed should garment wrinkle or crease.   Problem areas can be at joints, and top of garment, ensuring proximal aspect of garment positioned appropriately on extremity. 3. Launder garment nightly either by hand or washing machine in a garment bag or pillowcase. Use mild detergent with NO FABRIC SOFTENER, NO BLEACH, NO WOOLITE. Wash in cool/warm water. 4. Dry garment in dryer on low heat right side out in garment bag or pillowcase. 5. Perform skin care to extremity, cleansing skin daily with soap and water, and using low pH lotion, upward strokes to stimulate lymphatic vessels. 6. Daytime compression grments are NOT safe to sleep in, so remove at bedtime. 7. Perform exercise program with compression garments on. Signs/Symptoms of infection include:  Fever, flu-like symptoms, pain/redness/warmthin extremity, sometimes with increase in swelling present. Stop wearing your compression garment if this occurs. Call your doctor immediately or go to the Emergency room to address potential infection. Remove compression with changes in circulation, numbness in extremity different from what you may experience when not wearing compression garment, pain, unexplained shortness of breath. Notify clinic if swelling increase noted prior to next scheduled appt. Night time compression may be needed for optimal management of lymphedema. Return in 2 weeks for follow up appt. Kinesiotaping: na     TOTAL TREATMENT 55 mins     ASSESSMENT:   Treatment effectiveness and tolerance: Note good fit of compression garments. Patient reports garments comfortable, expressing good understanding of home management of compression garments as noted above. Progress toward goals: See updated goals. PLAN OF CARE:   Changes to the plan of care: Patient to return in 1 week to reassess limb volumes and fit/effectiveness of compression garments. Pt states he plans to relocated to [address] [place] [address] at the end of the month. Pt advised to bring new zip code to allow therapist to assist him with locating a CLT in that area.      Frequency: prn   Other: na London Martinez, PT, CLT

## 2018-07-19 ENCOUNTER — OFFICE VISIT (OUTPATIENT)
Dept: FAMILY MEDICINE CLINIC | Age: 53
End: 2018-07-19

## 2018-07-19 VITALS
WEIGHT: 194 LBS | OXYGEN SATURATION: 99 % | DIASTOLIC BLOOD PRESSURE: 95 MMHG | TEMPERATURE: 97.1 F | HEIGHT: 67 IN | HEART RATE: 74 BPM | RESPIRATION RATE: 16 BRPM | SYSTOLIC BLOOD PRESSURE: 140 MMHG | BODY MASS INDEX: 30.45 KG/M2

## 2018-07-19 DIAGNOSIS — S90.414A INFECTED ABRASION OF TOE OF RIGHT FOOT, INITIAL ENCOUNTER: Primary | ICD-10-CM

## 2018-07-19 DIAGNOSIS — L08.9 INFECTED ABRASION OF TOE OF RIGHT FOOT, INITIAL ENCOUNTER: Primary | ICD-10-CM

## 2018-07-19 RX ORDER — RESVER/WINE/BFL/GRPSD/PC/C/POM 200MG-60MG
1 CAPSULE ORAL
COMMUNITY
Start: 2018-06-30

## 2018-07-19 RX ORDER — IPRATROPIUM BROMIDE AND ALBUTEROL SULFATE 2.5; .5 MG/3ML; MG/3ML
3 SOLUTION RESPIRATORY (INHALATION)
Refills: 3 | COMMUNITY
Start: 2018-06-27

## 2018-07-19 RX ORDER — ONDANSETRON 8 MG/1
8 TABLET, ORALLY DISINTEGRATING ORAL
COMMUNITY
End: 2018-07-19 | Stop reason: ALTCHOICE

## 2018-07-19 RX ORDER — INDOMETHACIN 50 MG/1
50 CAPSULE ORAL
Refills: 2 | COMMUNITY
Start: 2018-04-30 | End: 2018-07-19

## 2018-07-19 RX ORDER — CEPHALEXIN 500 MG/1
500 CAPSULE ORAL 3 TIMES DAILY
Qty: 21 CAP | Refills: 0 | Status: SHIPPED | OUTPATIENT
Start: 2018-07-19 | End: 2018-07-26

## 2018-07-19 RX ORDER — MAGNESIUM 200 MG
1000 TABLET ORAL DAILY
COMMUNITY

## 2018-07-19 RX ORDER — SUMATRIPTAN 100 MG/1
TABLET, FILM COATED ORAL
COMMUNITY
End: 2018-07-19 | Stop reason: ALTCHOICE

## 2018-07-19 RX ORDER — HYDROCODONE BITARTRATE AND ACETAMINOPHEN 5; 325 MG/1; MG/1
1 TABLET ORAL
Refills: 0 | COMMUNITY
Start: 2018-07-05

## 2018-07-19 RX ORDER — MONTELUKAST SODIUM 10 MG/1
10 TABLET ORAL
Refills: 3 | COMMUNITY
Start: 2018-06-13

## 2018-07-19 NOTE — PATIENT INSTRUCTIONS

## 2018-07-19 NOTE — MR AVS SNAPSHOT
[facility] 
 
 
 [address] [facility] 
[phone] Patient: Negra Maldonado MRN: [id number] :1965 Visit Information Date & Time Provider Department Dept. Phone Encounter #  
 2018  9:00 AM Red García [phone] [id number] Follow-up Instructions Return if symptoms worsen or fail to improve. Your Appointments 2018  3:30 PM  
Any with Elsy Brar NP  [facility] ([facility]) Appt Note: 3 month f/u headache leathw [place] [address] [facility] [address] [facility] [id number] [phone]  
  
   
 [place] [address] [id number] [address] Upcoming Health Maintenance Date Due Pneumococcal 19-64 Highest Risk (1 of 3 - PCV13) 1982 FOBT Q 1 YEAR AGE 50-75 2013 Influenza Age 5 to Adult 2018 MEDICARE YEARLY EXAM 2019 DTaP/Tdap/Td series (2 - Td) 2027 Allergies as of 2018  Review Complete On: 2018 By: Hyacinth Hall NP Severity Noted Reaction Type Reactions Codeine High 2017    Anaphylaxis Pt has had both hydrocodone and oxycodone before Venom-honey Bee High 2015    Anaphylaxis Indomethacin  2018    Rash, Itching Methadone  2015    Rash Neurontin [Gabapentin]  2015    Vertigo Penicillins  2015    Rash Pt. States he takes Keflex with no difficulties or adverse reactions Perphenazine  2015    Other (comments) Tardive dyskinesia Trilafon  2015    Other (comments) Tardive dyskinesia Current Immunizations  Never Reviewed Name Date Tdap 2017 Not reviewed this visit You Were Diagnosed With   
  
 Codes Comments Infected abrasion of toe of right foot, initial encounter    -  Primary ICD-10-CM: S90.414A, L08.9 ICD-9-CM: 917.1 Vitals BP Pulse Temp Resp Height(growth percentile) Weight(growth percentile) (!) 140/95 (BP 1 Location: Right arm, BP Patient Position: Sitting) 74 97.1 °F (36.2 °C) (Oral) 16 5' 7\" (1.702 m) 194 lb (88 kg) SpO2 BMI Smoking Status 99% 30.38 kg/m2 Current Every Day Smoker Vitals History BMI and BSA Data Body Mass Index Body Surface Area  
 30.38 kg/m 2 2.04 m 2 Preferred Pharmacy Pharmacy Name Phone Connor Garzon [facility] [address] [phone] Your Updated Medication List  
  
   
This list is accurate as of 7/19/18  9:33 AM.  Always use your most recent med list.  
  
  
  
  
 albuterol-ipratropium 2.5 mg-0.5 mg/3 ml Nebu Commonly known as:  DUO-NEB  
3 mL by Nebulization route every four (4) hours as needed. INHALE 1 VIAL VIA NEBULIZER EVERY 4 HOURS AS NEEDED  
  
 BREO ELLIPTA 200-25 mcg/dose inhaler Generic drug:  fluticasone-vilanterol INHALE 1 PUFF EVERY DAY  
  
 cephALEXin 500 mg capsule Commonly known as:  Gearold Spry Take 1 Cap by mouth three (3) times daily for 7 days. cyclobenzaprine 10 mg tablet Commonly known as:  FLEXERIL Take 1 Tab by mouth two (2) times a day. PRN  
  
 diclofenac EC 75 mg EC tablet Commonly known as:  VOLTAREN  
TAKE 1 TABLET TWICE DAILY  FOR  OSTEOARTHRITIS  
  
 EPINEPHrine 0.3 mg/0.3 mL injection Commonly known as:  Ander Yu INJECT  0.3  ML INTRAMUSCULARLY  ONCE AS NEEDED  FOR  UP  TO  ONE DOSE  
  
 furosemide 20 mg tablet Commonly known as:  LASIX Take 1 Tab by mouth daily. HYDROcodone-acetaminophen 5-325 mg per tablet Commonly known as:  Nelma Liz Take 1 Tab by mouth two (2) times daily as needed. montelukast 10 mg tablet Commonly known as:  SINGULAIR Take 10 mg by mouth once over twenty-four (24) hours. pantoprazole 40 mg tablet Commonly known as:  PROTONIX Take 1 Tab by mouth two (2) times a day. rizatriptan 10 mg disintegrating tablet Commonly known as:  MAXALT-MLT  
DISSOLVE 1 TABLET ON THE TONGUE AT ONSET OF HEADACHE AND MAY REPEAT IN 2 HOURS IF NEEDED. MAXIMUM OF 2 TABLETS IN 24 HOURS. topiramate 25 mg tablet Commonly known as:  TOPAMAX Take 3 tablets by mouth nightly VENTOLIN HFA 90 mcg/actuation inhaler Generic drug:  albuterol INHALE  1 PUFF EVERY FOUR (4) HOURS AS NEEDED FOR WHEEZING. VITAMIN B COMPLEX PO Take 1 Tab by mouth once over twenty-four (24) hours. VITAMIN B-12 1,000 mcg sublingual tablet Generic drug:  cyanocobalamin Take 1,000 mcg by mouth daily. VITAMIN D3 5,000 unit Tab tablet Generic drug:  cholecalciferol (VITAMIN D3) Take 1 Tab by mouth once over twenty-four (24) hours. Prescriptions Sent to Pharmacy Refills  
 cephALEXin (KEFLEX) 500 mg capsule 0 Sig: Take 1 Cap by mouth three (3) times daily for 7 days. Class: Normal  
 Pharmacy: [address] [place] [address] #: [phone] Route: Oral  
  
Follow-up Instructions Return if symptoms worsen or fail to improve. To-Do List   
 07/24/2018 9:30 AM  
  Appointment with Tony Mancia at [address] [facility] ([phone]) Patient Instructions Scrapes (Abrasions): Care Instructions Your Care Instructions Scrapes (abrasions) are wounds where your skin has been rubbed or torn off. Most scrapes do not go deep into the skin, but some may remove several layers of skin. Scrapes usually don't bleed much, but they may ooze pinkish fluid. Scrapes on the head or face may appear worse than they are. They may bleed a lot because of the good blood supply to this area. Most scrapes heal well and may not need a bandage. They usually heal within 3 to 7 days. A large, deep scrape may take 1 to 2 weeks or longer to heal. A scab may form on some scrapes. Follow-up care is a key part of your treatment and safety.  Be sure to make and go to all appointments, and call your doctor if you are having problems. It's also a good idea to know your test results and keep a list of the medicines you take. How can you care for yourself at home? · If your doctor told you how to care for your wound, follow your doctor's instructions. If you did not get instructions, follow this general advice: ¨ Wash the scrape with clean water 2 times a day. Don't use hydrogen peroxide or alcohol, which can slow healing. ¨ You may cover the scrape with a thin layer of petroleum jelly, such as Vaseline, and a nonstick bandage. ¨ Apply more petroleum jelly and replace the bandage as needed. · Prop up the injured area on a pillow anytime you sit or lie down during the next 3 days. Try to keep it above the level of your heart. This will help reduce swelling. · Be safe with medicines. Take pain medicines exactly as directed. ¨ If the doctor gave you a prescription medicine for pain, take it as prescribed. ¨ If you are not taking a prescription pain medicine, ask your doctor if you can take an over-the-counter medicine. When should you call for help? Call your doctor now or seek immediate medical care if: 
  · You have signs of infection, such as: 
¨ Increased pain, swelling, warmth, or redness around the scrape. ¨ Red streaks leading from the scrape. ¨ Pus draining from the scrape. ¨ A fever.  
  · The scrape starts to bleed, and blood soaks through the bandage. Oozing small amounts of blood is normal.  
 Watch closely for changes in your health, and be sure to contact your doctor if the scrape is not getting better each day. Where can you learn more? Go to http://oscar-keely.info/. Enter A374 in the search box to learn more about \"Scrapes (Abrasions): Care Instructions. \" Current as of: November 20, 2017 Content Version: 11.7 © 1930-7957 [employer].  Care instructions adapted under license by [employer] (which disclaims liability or warranty for this information). If you have questions about a medical condition or this instruction, always ask your healthcare professional. [address] any warranty or liability for your use of this information. Introducing [facility] HE[facility]! Dear Karlos Chapin: 
Thank you for requesting a [employer] account. Our records indicate that you already have an active [employer] account. You can access your account anytime at [email or web address] Did you know that you can access your hospital and ER discharge instructions at any time in [employer]? You can also review all of your test results from your hospital stay or ER visit. Additional Information If you have questions, please visit the Frequently Asked Questions section of the [employer] website at [email or web address] Remember, [employer] is NOT to be used for urgent needs. For medical emergencies, dial 911. Now available from your iPhone and Android! Please provide this summary of care documentation to your next provider. Your primary care clinician is listed as Sarika Carmona. If you have any questions after today's visit, please call [phone].

## 2018-07-19 NOTE — PROGRESS NOTES
Identified pt with two pt identifiers(name and ). Chief Complaint   Patient presents with    Foot Ulcer     has sore on the right great toe  - had blister last night that drained        Health Maintenance Due   Topic    Pneumococcal 19-64 Highest Risk (1 of 3 - PCV13)    FOBT Q 1 YEAR AGE 50-75        Wt Readings from Last 3 Encounters:   18 194 lb (88 kg)   18 192 lb (87.1 kg)   18 190 lb (86.2 kg)     Temp Readings from Last 3 Encounters:   18 97.1 °F (36.2 °C) (Oral)   18 97.2 °F (36.2 °C) (Oral)   18 98 °F (36.7 °C) (Oral)     BP Readings from Last 3 Encounters:   18 (!) 140/95   18 130/75   18 130/88     Pulse Readings from Last 3 Encounters:   18 74   18 83   18 94         Learning Assessment:  :     Learning Assessment 2016 12/10/2015   PRIMARY LEARNER Patient Patient   HIGHEST LEVEL OF EDUCATION - PRIMARY LEARNER  2 YEARS OF COLLEGE -   CO-LEARNER CAREGIVER No No   PRIMARY LANGUAGE ENGLISH ENGLISH   LEARNER PREFERENCE PRIMARY LISTENING LISTENING   ANSWERED BY patient patient   RELATIONSHIP SELF SELF       Depression Screening:  :     PHQ over the last two weeks 2018   Little interest or pleasure in doing things Not at all   Feeling down, depressed, irritable, or hopeless Not at all   Total Score PHQ 2 0       Fall Risk Assessment:  :     Fall Risk Assessment, last 12 mths 2018   Able to walk? Yes   Fall in past 12 months? No       Abuse Screening:  :     Abuse Screening Questionnaire 2018   Do you ever feel afraid of your partner? N N   Are you in a relationship with someone who physically or mentally threatens you? N N   Is it safe for you to go home?  Y Y         Coordination of Care Questionnaire:  :     1) Have you been to an emergency room, urgent care clinic since your last visit? no   Hospitalized since your last visit? no             2) Have you seen or consulted any other health care providers outside of [address] [place] [address] since your last visit? no  (Include any pap smears or colon screenings in this section.)    3) Do you have an Advance Directive on file? no  Are you interested in receiving information about Advance Directives? no    Patient is accompanied by self. Reviewed record in preparation for visit and have obtained necessary documentation. Medication reconciliation up to date and corrected with patient at this time.

## 2018-07-19 NOTE — PROGRESS NOTES
HISTORY OF PRESENT ILLNESS  Salo Fofana is a 52 y.o. male. HPI  Pt presents with \"sore on right great toe\"    He went to cut his toe nail on his right great toe last night, and noted a large blister underneath the nail. It popped, and has been draining some liquid since then. The area is sore, and somewhat red at this time. He is worried about it getting infected, based on the area. No fever  OTC: bacitracin ointment  Review of Systems   Constitutional: Negative for fever. HENT: Negative for congestion. Respiratory: Negative for cough. Gastrointestinal: Negative for diarrhea and vomiting. Physical Exam   Constitutional: He is oriented to person, place, and time. He appears well-developed and well-nourished. HENT:   Head: Normocephalic and atraumatic. Cardiovascular: Normal rate, regular rhythm and normal heart sounds. Pulmonary/Chest: Effort normal and breath sounds normal.   Musculoskeletal:        Feet:    Neurological: He is alert and oriented to person, place, and time. Skin: Skin is warm and dry. Psychiatric: He has a normal mood and affect. His behavior is normal.       ASSESSMENT and PLAN    ICD-10-CM ICD-9-CM    1. Infected abrasion of toe of right foot, initial encounter S90.414A 917.1 cephALEXin (KEFLEX) 500 mg capsule    L08.9       Informed patient that I have sent medication to the pharmacy, and should take as prescribed. Educated about taking with food, to decrease the risk of stomach upset. Educated about staying well hydrated, and soaking foot in epsom salt to aid in relief. Pt informed to return to office with worsening of symptoms, or PRN with any questions or concerns. Pt verbalizes understanding of plan of care and denies further questions or concerns at this time.

## 2018-07-25 ENCOUNTER — OFFICE VISIT (OUTPATIENT)
Dept: FAMILY MEDICINE CLINIC | Age: 53
End: 2018-07-25

## 2018-07-25 VITALS
BODY MASS INDEX: 30.45 KG/M2 | DIASTOLIC BLOOD PRESSURE: 76 MMHG | SYSTOLIC BLOOD PRESSURE: 135 MMHG | WEIGHT: 194 LBS | TEMPERATURE: 97.1 F | RESPIRATION RATE: 16 BRPM | HEIGHT: 67 IN | HEART RATE: 70 BPM | OXYGEN SATURATION: 97 %

## 2018-07-25 DIAGNOSIS — L91.8 SKIN TAG: Primary | ICD-10-CM

## 2018-07-25 NOTE — PROGRESS NOTES
HISTORY OF PRESENT ILLNESS  Radha Curtis is a 52 y.o. male. HPI  Pt presents with \"groin pain\"    Pt states that he has a spot in his groin area, where his underwear band rubs, that has been bothering him for a couple of weeks. Pt thought it was a tick at first, and tried to pull it, but it felt as though it was attached. Pt states that when he pulled on this area, it did bleed, but has since stopped. Pt states that it is painful as it is on the line of his underwear. No pain with urination. Pt is unable to see the area, so is unsure exactly what is going on. Review of Systems   Constitutional: Negative for fever. HENT: Negative for congestion. Respiratory: Negative for cough. Gastrointestinal: Negative for diarrhea and vomiting. Physical Exam   Constitutional: He is oriented to person, place, and time. He appears well-developed and well-nourished. HENT:   Head: Normocephalic and atraumatic. Cardiovascular: Normal rate, regular rhythm and normal heart sounds. Pulmonary/Chest: Effort normal and breath sounds normal.   Genitourinary:         Neurological: He is alert and oriented to person, place, and time. Skin: Skin is warm and dry. Psychiatric: He has a normal mood and affect. His behavior is normal.       ASSESSMENT and PLAN    ICD-10-CM ICD-9-CM    1. Skin tag L91.8 701.9 REFERRAL TO DERMATOLOGY     Informed patient that he should be seen by dermatology, for possible removal of skin tag. Educated about calling for appointment ASAP. Pt informed to return to office with worsening of symptoms, or PRN with any questions or concerns. Pt verbalizes understanding of plan of care and denies further questions or concerns at this time.

## 2018-07-25 NOTE — PROGRESS NOTES
Identified pt with two pt identifiers(name and ). Chief Complaint   Patient presents with    Groin Pain     spot in groin area on left side        Health Maintenance Due   Topic    Pneumococcal 19-64 Highest Risk (1 of 3 - PCV13)    FOBT Q 1 YEAR AGE 50-75        Wt Readings from Last 3 Encounters:   18 194 lb (88 kg)   18 194 lb (88 kg)   18 192 lb (87.1 kg)     Temp Readings from Last 3 Encounters:   18 97.1 °F (36.2 °C) (Oral)   18 97.1 °F (36.2 °C) (Oral)   18 97.2 °F (36.2 °C) (Oral)     BP Readings from Last 3 Encounters:   18 135/76   18 (!) 140/95   18 130/75     Pulse Readings from Last 3 Encounters:   18 70   18 74   18 83         Learning Assessment:  :     Learning Assessment 2016 12/10/2015   PRIMARY LEARNER Patient Patient   HIGHEST LEVEL OF EDUCATION - PRIMARY LEARNER  2 YEARS OF COLLEGE -   CO-LEARNER CAREGIVER No No   PRIMARY LANGUAGE ENGLISH ENGLISH   LEARNER PREFERENCE PRIMARY LISTENING LISTENING   ANSWERED BY patient patient   RELATIONSHIP SELF SELF       Depression Screening:  :     PHQ over the last two weeks 2018   Little interest or pleasure in doing things Not at all   Feeling down, depressed, irritable, or hopeless Not at all   Total Score PHQ 2 0       Fall Risk Assessment:  :     Fall Risk Assessment, last 12 mths 2018   Able to walk? Yes   Fall in past 12 months? No       Abuse Screening:  :     Abuse Screening Questionnaire 2018   Do you ever feel afraid of your partner? N N   Are you in a relationship with someone who physically or mentally threatens you? N N   Is it safe for you to go home?  Y Y       Coordination of Care Questionnaire:  :     1) Have you been to an emergency room, urgent care clinic since your last visit? no   Hospitalized since your last visit? no             2) Have you seen or consulted any other health care providers outside of [facility] since your last visit? no  (Include any pap smears or colon screenings in this section.)    3) Do you have an Advance Directive on file? no  Are you interested in receiving information about Advance Directives? no    Patient is accompanied by self. Reviewed record in preparation for visit and have obtained necessary documentation. Medication reconciliation up to date and corrected with patient at this time.

## 2018-07-25 NOTE — MR AVS SNAPSHOT
[facility] 
 
 
 [address] [facility] 
[phone] Patient: Salo Fofana MRN: [id number] :1965 Visit Information Date & Time Provider Department Dept. Phone Encounter #  
 2018 10:15 AM Devan Dillon  [address] [phone] [id number] Follow-up Instructions Return if symptoms worsen or fail to improve. Your Appointments 2018  3:30 PM  
Any with Paige Orourke NP  [facility] ([facility]) Appt Note: 3 month f/u headache leathw [place] [address] [facility] [id number] [phone]  
  
   
 [place] [address] [id number] [address] Upcoming Health Maintenance Date Due Pneumococcal 19-64 Highest Risk (1 of 3 - PCV13) 1982 FOBT Q 1 YEAR AGE 50-75 2013 Influenza Age 5 to Adult 2018 MEDICARE YEARLY EXAM 2019 DTaP/Tdap/Td series (2 - Td) 2027 Allergies as of 2018  Review Complete On: 2018 By: Devan Dillon NP Severity Noted Reaction Type Reactions Codeine High 2017    Anaphylaxis Pt has had both hydrocodone and oxycodone before Venom-honey Bee High 2015    Anaphylaxis Indomethacin  2018    Rash, Itching Methadone  2015    Rash Neurontin [Gabapentin]  2015    Vertigo Penicillins  2015    Rash Pt. States he takes Keflex with no difficulties or adverse reactions Perphenazine  2015    Other (comments) Tardive dyskinesia Trilafon  2015    Other (comments) Tardive dyskinesia Current Immunizations  Never Reviewed Name Date Tdap 2017 Not reviewed this visit You Were Diagnosed With   
  
 Codes Comments Skin tag    -  Primary ICD-10-CM: L91.8 ICD-9-CM: 701.9 Vitals BP Pulse Temp Resp Height(growth percentile) Weight(growth percentile) 135/76 (BP 1 Location: Right arm, BP Patient Position: Sitting) 70 97.1 °F (36.2 °C) (Oral) 16 5' 7\" (1.702 m) 194 lb (88 kg) SpO2 BMI Smoking Status 97% 30.38 kg/m2 Current Every Day Smoker Vitals History BMI and BSA Data Body Mass Index Body Surface Area  
 30.38 kg/m 2 2.04 m 2 Preferred Pharmacy Pharmacy Name Phone [facility] [place] [facility] [address] [phone]. [phone] Your Updated Medication List  
  
   
This list is accurate as of 7/25/18 10:25 AM.  Always use your most recent med list.  
  
  
  
  
 albuterol-ipratropium 2.5 mg-0.5 mg/3 ml Nebu Commonly known as:  DUO-NEB  
3 mL by Nebulization route every four (4) hours as needed. INHALE 1 VIAL VIA NEBULIZER EVERY 4 HOURS AS NEEDED  
  
 BREO ELLIPTA 200-25 mcg/dose inhaler Generic drug:  fluticasone-vilanterol INHALE 1 PUFF EVERY DAY  
  
 cephALEXin 500 mg capsule Commonly known as:  Wood Bless Take 1 Cap by mouth three (3) times daily for 7 days. cyclobenzaprine 10 mg tablet Commonly known as:  FLEXERIL Take 1 Tab by mouth two (2) times a day. PRN  
  
 diclofenac EC 75 mg EC tablet Commonly known as:  VOLTAREN  
TAKE 1 TABLET TWICE DAILY  FOR  OSTEOARTHRITIS  
  
 EPINEPHrine 0.3 mg/0.3 mL injection Commonly known as:  Emma De Paz INJECT  0.3  ML INTRAMUSCULARLY  ONCE AS NEEDED  FOR  UP  TO  ONE DOSE  
  
 furosemide 20 mg tablet Commonly known as:  LASIX Take 1 Tab by mouth daily. HYDROcodone-acetaminophen 5-325 mg per tablet Commonly known as:  Alfa Dolphin Take 1 Tab by mouth two (2) times daily as needed. montelukast 10 mg tablet Commonly known as:  SINGULAIR Take 10 mg by mouth once over twenty-four (24) hours. pantoprazole 40 mg tablet Commonly known as:  PROTONIX Take 1 Tab by mouth two (2) times a day. rizatriptan 10 mg disintegrating tablet Commonly known as:  MAXALT-MLT  
DISSOLVE 1 TABLET ON THE TONGUE AT ONSET OF HEADACHE AND MAY REPEAT IN 2 HOURS IF NEEDED. MAXIMUM OF 2 TABLETS IN 24 HOURS. topiramate 25 mg tablet Commonly known as:  TOPAMAX Take 3 tablets by mouth nightly VENTOLIN HFA 90 mcg/actuation inhaler Generic drug:  albuterol INHALE  1 PUFF EVERY FOUR (4) HOURS AS NEEDED FOR WHEEZING. VITAMIN B COMPLEX PO Take 1 Tab by mouth once over twenty-four (24) hours. VITAMIN B-12 1,000 mcg sublingual tablet Generic drug:  cyanocobalamin Take 1,000 mcg by mouth daily. VITAMIN D3 5,000 unit Tab tablet Generic drug:  cholecalciferol (VITAMIN D3) Take 1 Tab by mouth once over twenty-four (24) hours. We Performed the Following REFERRAL TO DERMATOLOGY [REF19 Custom] Follow-up Instructions Return if symptoms worsen or fail to improve. Referral Information Referral ID Referred By Referred To  
  
 [id number] [facility] Nena Wilkins MD   
   [address] [place] [address] Phone: [phone] Fax: [phone] Visits Status Start Date End Date 1 New Request 7/25/18 7/25/19 If your referral has a status of pending review or denied, additional information will be sent to support the outcome of this decision. Patient Instructions Skin Tag Removal: Care Instructions Your Care Instructions Skin tags are small lumps of fleshy brown, tan, or pink skin. They are usually raised or hang from the skin on a small stalk. They often grow on the eyelids, neck, armpit, and groin. Skin tags are not moles and usually do not turn into cancer. You are more likely to get skin tags if you are overweight. They also tend to run in families. Skin tags may be removed if they bother you. Your doctor can remove an unwanted skin tag by simply cutting it off. However, new skin tags often form. Follow-up care is a key part of your treatment and safety. Be sure to make and go to all appointments, and call your doctor if you are having problems. It's also a good idea to know your test results and keep a list of the medicines you take. How can you care for yourself at home? · If clothing irritates a skin tag, cover it with a bandage to prevent rubbing and bleeding. · If you have a skin tag removed, clean the area with soap and water two times a day unless your doctor gives you different instructions. Don't use hydrogen peroxide or alcohol, which can slow healing. ¨ You may cover the wound with a thin layer of petroleum jelly, such as Vaseline, and a nonstick bandage. · Check all the skin on your body once a month for skin growths or other changes, such as color and feel of the skin. ¨  front of a full-length mirror. Look carefully at the front and back of your body. Then look at your right and left sides with your arms raised. YAMILET [facility] your elbows and look carefully at your forearms, the back of your upper arms, and your palms. ¨ Look at your feet, the soles of your feet, and the spaces between your toes. ¨ Use a hand mirror to look at the back of your legs, the back of your neck, and your back, rear end (buttocks), and genital area. Part the hair on your head to look at your scalp. · If you see a change in a skin growth, contact your doctor. Look for: ¨ A mole that bleeds. ¨ A fast-growing mole. ¨ A scaly or crusted growth on the skin. ¨ A sore that will not heal. 
When should you call for help? Call your doctor now or seek immediate medical care if: 
  · You have signs of infection such as: 
¨ Pain, warmth, or swelling in your skin. ¨ Red streaks near a wound in your skin. ¨ Pus coming from a wound in your skin.  
¨ A fever.  
 Watch closely for changes in your health, and be sure to contact your doctor if: 
  · You have an area of normal skin that suddenly changes in shape, size, or how it looks.  
  · You do not get better as expected. Where can you learn more? Go to http://oscar-keely.info/. Enter ([phone] in the search box to learn more about \"Skin Tag Removal: Care Instructions. \" Current as of: October 5, 2017 Content Version: 11.7 © 8277-5451 [employer]. Care instructions adapted under license by [employer] (which disclaims liability or warranty for this information). If you have questions about a medical condition or this instruction, always ask your healthcare professional. [address] any warranty or liability for your use of this information. Introducing [facility] & HEALTH SERVICES! Dear Maxi Junior: 
Thank you for requesting a [employer] account. Our records indicate that you already have an active [employer] account. You can access your account anytime at [email or web address] Did you know that you can access your hospital and ER discharge instructions at any time in [employer]? You can also review all of your test results from your hospital stay or ER visit. Additional Information If you have questions, please visit the Frequently Asked Questions section of the [employer] website at [email or web address] Remember, [employer] is NOT to be used for urgent needs. For medical emergencies, dial 911. Now available from your iPhone and Android! Please provide this summary of care documentation to your next provider. Your primary care clinician is listed as Sarika Carmona. If you have any questions after today's visit, please call [phone].

## 2018-07-25 NOTE — PATIENT INSTRUCTIONS
Skin Tag Removal: Care Instructions  Your Care Instructions  Skin tags are small lumps of fleshy brown, tan, or pink skin. They are usually raised or hang from the skin on a small stalk. They often grow on the eyelids, neck, armpit, and groin. Skin tags are not moles and usually do not turn into cancer. You are more likely to get skin tags if you are overweight. They also tend to run in families. Skin tags may be removed if they bother you. Your doctor can remove an unwanted skin tag by simply cutting it off. However, new skin tags often form. Follow-up care is a key part of your treatment and safety. Be sure to make and go to all appointments, and call your doctor if you are having problems. It's also a good idea to know your test results and keep a list of the medicines you take. How can you care for yourself at home? · If clothing irritates a skin tag, cover it with a bandage to prevent rubbing and bleeding. · If you have a skin tag removed, clean the area with soap and water two times a day unless your doctor gives you different instructions. Don't use hydrogen peroxide or alcohol, which can slow healing. ¨ You may cover the wound with a thin layer of petroleum jelly, such as Vaseline, and a nonstick bandage. · Check all the skin on your body once a month for skin growths or other changes, such as color and feel of the skin. ¨  front of a full-length mirror. Look carefully at the front and back of your body. Then look at your right and left sides with your arms raised. YAMILET [facility] your elbows and look carefully at your forearms, the back of your upper arms, and your palms. ¨ Look at your feet, the soles of your feet, and the spaces between your toes. ¨ Use a hand mirror to look at the back of your legs, the back of your neck, and your back, rear end (buttocks), and genital area. Part the hair on your head to look at your scalp. · If you see a change in a skin growth, contact your doctor. Look for:  ¨ A mole that bleeds. ¨ A fast-growing mole. ¨ A scaly or crusted growth on the skin. ¨ A sore that will not heal.  When should you call for help? Call your doctor now or seek immediate medical care if:    · You have signs of infection such as:  ¨ Pain, warmth, or swelling in your skin. ¨ Red streaks near a wound in your skin. ¨ Pus coming from a wound in your skin. ¨ A fever.    Watch closely for changes in your health, and be sure to contact your doctor if:    · You have an area of normal skin that suddenly changes in shape, size, or how it looks.     · You do not get better as expected. Where can you learn more? Go to http://oscar-keely.info/. Enter ([phone] [phone] in the search box to learn more about \"Skin Tag Removal: Care Instructions. \"  Current as of: October 5, 2017  Content Version: 11.7  © 7334-0764 [employer]. Care instructions adapted under license by [employer] (which disclaims liability or warranty for this information). If you have questions about a medical condition or this instruction, always ask your healthcare professional. [address] any warranty or liability for your use of this information.

## 2018-08-08 ENCOUNTER — TELEPHONE (OUTPATIENT)
Dept: NEUROLOGY | Age: 53
End: 2018-08-08

## 2019-09-25 NOTE — ED NOTES
Procedure is scheduled in [facility].   Wound cleansed with wound cleanser. Steri-strips applied. Pt tolerated well.

## 2019-11-11 NOTE — TELEPHONE ENCOUNTER
Patient is having a migraine and stated that geetha changed the medication and he doesn't know what it is .  If the nurse could give the patient a call back no...
